# Patient Record
Sex: FEMALE | Race: WHITE | NOT HISPANIC OR LATINO
[De-identification: names, ages, dates, MRNs, and addresses within clinical notes are randomized per-mention and may not be internally consistent; named-entity substitution may affect disease eponyms.]

---

## 2018-05-30 ENCOUNTER — TRANSCRIBE ORDERS (OUTPATIENT)
Dept: SCHEDULING | Age: 43
End: 2018-05-30

## 2018-05-30 DIAGNOSIS — Z12.39 SCREENING BREAST EXAMINATION: Primary | ICD-10-CM

## 2018-06-04 ENCOUNTER — HOSPITAL ENCOUNTER (OUTPATIENT)
Dept: RADIOLOGY | Age: 43
Discharge: HOME | End: 2018-06-04
Attending: OBSTETRICS & GYNECOLOGY
Payer: COMMERCIAL

## 2018-06-04 DIAGNOSIS — Z12.39 SCREENING BREAST EXAMINATION: ICD-10-CM

## 2018-06-04 PROCEDURE — 77063 BREAST TOMOSYNTHESIS BI: CPT

## 2018-09-19 ENCOUNTER — APPOINTMENT (OUTPATIENT)
Dept: REHABILITATION | Facility: REHABILITATION | Age: 43
End: 2018-09-19
Attending: INTERNAL MEDICINE
Payer: COMMERCIAL

## 2018-09-19 PROCEDURE — 99000054 HC AQUATICS 10 VISTS

## 2018-09-26 ENCOUNTER — HOSPITAL ENCOUNTER (OUTPATIENT)
Dept: PHYSICAL MEDICINE AND REHAB | Facility: REHABILITATION | Age: 43
Discharge: HOME | End: 2018-09-26

## 2019-04-03 ENCOUNTER — TRANSCRIBE ORDERS (OUTPATIENT)
Dept: SCHEDULING | Facility: REHABILITATION | Age: 44
End: 2019-04-03

## 2019-04-03 DIAGNOSIS — G71.20: Primary | ICD-10-CM

## 2019-04-08 ENCOUNTER — TRANSCRIBE ORDERS (OUTPATIENT)
Dept: SCHEDULING | Facility: REHABILITATION | Age: 44
End: 2019-04-08

## 2019-04-08 DIAGNOSIS — R26.2 DIFFICULTY IN WALKING, NOT ELSEWHERE CLASSIFIED: ICD-10-CM

## 2019-04-08 DIAGNOSIS — G71.02 FACIOSCAPULOHUMERAL MUSCULAR DYSTROPHY (CMS/HCC): Primary | ICD-10-CM

## 2019-04-08 DIAGNOSIS — G82.52 QUADRIPLEGIA, C1-C4 INCOMPLETE (CMS/HCC): ICD-10-CM

## 2019-05-02 ENCOUNTER — HOSPITAL ENCOUNTER (OUTPATIENT)
Dept: PHYSICAL THERAPY | Facility: REHABILITATION | Age: 44
Setting detail: THERAPIES SERIES
Discharge: HOME | End: 2019-05-02
Attending: PHYSICAL MEDICINE & REHABILITATION
Payer: COMMERCIAL

## 2019-05-02 VITALS — DIASTOLIC BLOOD PRESSURE: 72 MMHG | HEART RATE: 88 BPM | SYSTOLIC BLOOD PRESSURE: 135 MMHG

## 2019-05-02 DIAGNOSIS — G71.02 FACIOSCAPULOHUMERAL MUSCULAR DYSTROPHY (CMS/HCC): Primary | ICD-10-CM

## 2019-05-02 DIAGNOSIS — R26.2 DIFFICULTY IN WALKING, NOT ELSEWHERE CLASSIFIED: ICD-10-CM

## 2019-05-02 DIAGNOSIS — G82.52 QUADRIPLEGIA, C1-C4 INCOMPLETE (CMS/HCC): ICD-10-CM

## 2019-05-02 PROCEDURE — 97163 PT EVAL HIGH COMPLEX 45 MIN: CPT

## 2019-05-02 RX ORDER — OXYBUTYNIN CHLORIDE 5 MG/1
5 TABLET ORAL 3 TIMES DAILY
COMMUNITY
End: 2020-09-23 | Stop reason: SDUPTHER

## 2019-05-02 RX ORDER — DULOXETIN HYDROCHLORIDE 30 MG/1
30 CAPSULE, DELAYED RELEASE ORAL DAILY
COMMUNITY
End: 2020-07-10 | Stop reason: ENTERED-IN-ERROR

## 2019-05-02 NOTE — OP PT TREATMENT LOG
Exercises Current Session Time Done Today    THER ACT TOTAL TIME FOR SESSION Not performed     Caregiver Stretching HEP     Bed mobility        Transfers     Caregiver Training  PROM / Stretching          Goals / POC Review Reviewed POC, goals and progression towards meeting goals     THER EX TOTAL TIME FOR SESSION Not performed    PROM      Stretching:      Trunk stretching      Trunk Strengthening      Sitting EOM reaching      Core Strengthening           NMRE TOTAL TIME FOR SESSION Not performed     Sitting static balance       Sitting dynamic balance        AQUATICS TOTAL TIME FOR SESSION Not performed                     MANUAL TOTAL TIME FOR SESSION Not performed            MODALITIES TOTAL TIME FOR SESSION Not performed

## 2019-05-02 NOTE — LETTER
[address] [place] [address]  [phone]  [facility] Fax: [phone]    PHYSICAL THERAPY PLAN OF CARE    Patient Name: Sofia Valente    Certification Dates:  From 19  To: 19  Frequency: 2 times/week Duration: 8 weeks  Other:      Provider: Francesca Calderon. 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     By signing this plan of care, I certify this plan of care as correct and necessary for the patient.        Physician Signature: _________________________________________ Date: _________________    Thank you for this referral. Please contact our department with any questions.      Francesca Calderon. Tomás, PT        Referring Provider: By co-signing this Plan of Care (POC), you agree with the planned services and interventions recommended by the therapist.         PT EVALUATION FOR OUTPATIENT THERAPY    Patient: Sofia Valente    MRN: [id number]  : 1975 44 y.o.     Referring Physician: Dmitri Donald MD  Date of Visit: 2019       Certification Dates:   19 through 19    Recommended Frequency & Duration:  2 times/week for up to 8 weeks     Diagnosis:   1. Facioscapulohumeral muscular dystrophy    2. Difficulty in walking, not elsewhere classified    3. Quadriplegia, C1-C4 incomplete (CMS/HCC) (HCC)        Chief Complaints:   Chief Complaint   Patient presents with   • Balance  Deficits   • Dec Strength   • Decreased Mobility       Precautions: other (see comments) (don't exercise to fatigue)    Past Medical History:   Past Medical History:   Diagnosis Date   • CD (Crohn's disease) (CMS/HCC) (HCC)    • Celiac disease    • FSHD (facioscapulohumeral muscular dystrophy)    • Tibial fracture     right tibia transverse fracture   • Urinary incontinence        Past Surgical History:   Past Surgical History:   Procedure Laterality Date   • APPENDECTOMY     • BOWEL RESECTION     • OTHER SURGICAL HISTORY Right     Scapulothoracic fusion          OBJECTIVE MEASUREMENTS/DATA:    Eval Assessment          Evaluation Assessment and Plan - 05/02/19 1600        Evaluation Assessment and Plan    Plan of Care reviewed and patient/family in agreement Yes    System Pathology/Pathophysiology Noted musculoskeletal;neuromuscular    Functional Limitations in Following Categories (PT Eval) self-care;community/leisure    Rehab Potential/Prognosis good, to achieve stated therapy goals    Problem List abnormal muscle tone;tetraplegia (quadriplegia);impaired postural control;impaired motor control    Clinical Assessment see PT note for clinical assessment     Planned Services CPT 89442 Aquatic therapy/exercises;CPT 17142 Neuromuscular Reeducation;CPT 73304 Therapeutic activities;CPT 00143 Therapeutic Massage;CPT 71895 Manual therapy;CPT 81925 Therpeutic exercises;CPT 38936 Wheelchair management;CPT 51355 Hot/Cold Packs therapy        General Info         General Information - 05/02/19 1648        Session Details    Document Type initial evaluation    Mode of Treatment individual therapy;physical therapy    Patient/Family Observations Sofia reports a progression in her disease process which has led to noticable increase in difficulty in reaching, drinking from a cup and transferring.  She reports that she would like to try to improve her ability to perform these tasks, develop a stretching program her new 1 day /  week aide can follow and return to aquatic maintenance program.    OP Specialty Neuro       Time Calculation    Start Time 1400    Stop Time 1500    Time Calculation (min) 60 min       General Information    Patient Profile Reviewed? yes    Referring Physician Dr. Donald    Pertinent History of Current Functional Problem Pt is a 40-year-old with PMH significant for facioscapulohumeral muscular dystrophy with tetraparesis, bilateral foot drop worse on the left than the right side, Crohn's disease, and celiac disease. Sofia reports a progression in her disease process which has led to noticeable increase in difficulty in reaching, drinking from a cup and transferring.  She reports that she would like to try to improve her ability to perform these tasks, develop a stretching program her new 1 day / week aide can follow and return to aquatic maintenance program    Existing Precautions/Restrictions other (see comments)   don't exercise to fatigue        Pain and Vitals         Pain/Vitals - 05/02/19 1647        Pain/Comfort/Sleep    Presence of Pain denies       Pre Activity Vital Signs    Pulse 88    /72    BP Location Right upper arm    BP Method Automatic    Patient Position Sitting       Pain Intervention    Intervention  na denies pain     Post Intervention Comments na denies pain         Falls Assessment          Falls - 05/02/19 1658        Initial Falls Assessment    One or more falls in the last year No        Living Environment          Living Environment - 05/02/19 1657        Living Environment    Lives With spouse    Living Arrangements house    Home Accessibility ramp to enter home        PLOF         Prior Level of Function - 05/02/19 1657        OTHER    Previous level of function 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  PWC user, performs lateral transfer Mod I         Neuromuscular      Sensory Tests     Skin         Skin Assessment - 05/02/19 1659        Skin    Skin Condition  "Intact        MMT         Manual Muscle Tests - 05/02/19 1600        RIGHT: Lower Extremity Manual Muscle Test Assessment    Hip Flexion gross movement (1+/5) trace plus    Hip Abduction gross movement (1+/5) trace plus    Hip Adduction gross movement (1+/5) trace plus    Knee Extension strength (1+/5) trace plus    Ankle Dorsiflexion gross movement (3-/5) fair minus    Ankle Plantarflexion gross movement (3/5) fair    Ankle Inversion gross movement (2/5) poor    Ankle Eversion gross movement (2/5) poor       LEFT: Lower Extremity Manual Muscle Test Assessment    Hip Flexion gross movement (1+/5) trace plus    Hip Abduction gross movement (0/5) zero    Hip Adduction gross movement (1+/5) trace plus    Knee Extension strength (1+/5) trace plus    Ankle Dorsiflexion gross movement (0/5) zero    Ankle Plantarflexion gross movement (0/5) zero    Ankle Inversion gross movement (0/5) zero    Ankle Eversion gross movement (0/5) zero        Posture         Posture - 05/02/19 1600        Postural Deviations    Comment, Head and Neck flattened    Shoulder right shoulder elevation    Upper Back left;winging    Comment, Upper Back R scapula fused to thorax    Low Back lordosis    Comment, Low Back excessive lordosis         Transfers         Transfers - 05/02/19 1600        Bed Mobility    [place], Supine to Sit maximum assist (25% patient effort)    [place], Sit to Supine moderate assist (50% patient effort)    Comment (Bed Mobility) pt requires level or downhill lateral tarnsfer set up: moves to L and places RUE on PWC cushion to then move into long sit; requires assistance to move from long sit to / from supine       Transfers    Comment excessive anterior weight shift with decreased buttock clearance        Stand to Sit Transfer    Comment Reports she has \"moved away from standing\"        Gait and Mobility         Gait and Mobility - 05/02/19 1600        Gait Training    [place], Gait --   not ambulating at " this time         Balance         Balance - 05/02/19 1600        Static Sitting Balance    [place], Unsupported Sitting modified independence    Sitting Position sitting edge of mat    Time Able to Maintain Position 1 to 2 minutes    [place], Supported Sitting modified independence    Sitting Position sitting edge of mat    Time Able to Maintain Position more than 5 minutes       Dynamic Sitting Balance    Comment, Reaches Outside Midline minimal ability to maki outside of EVA.  Seated Functional Reach: Forward 3.5 inches, Left 1 inch, Right 6 inches         PT CLINICAL ASSESSMENT:    Pt is a 40-year-old with PMH significant for facioscapulohumeral muscular dystrophy with tetraparesis, bilateral foot drop worse on the left than the right side, Crohn's disease, and celiac disease. Sofia reports a progression in her disease process which has led to noticeable increase in difficulty in reaching, drinking from a cup and transferring.  She reports that she would like to try to improve her ability to perform these tasks, develop a stretching program her new 1 day / week aide can follow and return to aquatic maintenance program.  She present with tetraplegia from FSHD, decreased endurance, report of worsening ability to reach / drink from a cup, and severe postural abnormalities.  PT POC to focus on improving sitting balance for transfer safety, improving ability to reach, developing stretching HEP for aide, and assessing ability to return to aquatic maintenance program.  Anticipate 4-6 weeks of PT 2x / week.     Goals     • PT GOALS                Short Term Goals Time Frame Result Comment/Progress   Pt will direct aide to perform home stretching program    4-6 weeks     Improve sitting functional reach test by > 1 inch in all directions   4-6 weeks     Pt will perform lateral transfer WC<> chair lift in/out of pool with S to return to maintenance program   4-6 weeks      Pt will perform safe transfer wet WC  <> mat table to change after aquatic therapy to return to maintenance program    4-6 weeks     Pt will walk in 4 ft deep water with 1# ankle weights with distant supervision to return to maintenance program.   4-6 weeks     Pt will be independent with maintenance aquatic program .   4-6weeks                     TREATMENT PLAN:      Exercises Current Session Time Done Today    THER ACT TOTAL TIME FOR SESSION Not performed     Caregiver Stretching HEP     Bed mobility        Transfers     Caregiver Training  PROM / Stretching          Goals / POC Review Reviewed POC, goals and progression towards meeting goals     THER EX TOTAL TIME FOR SESSION Not performed    PROM      Stretching:      Trunk stretching      Trunk Strengthening      Sitting EOM reaching      Core Strengthening           NMRE TOTAL TIME FOR SESSION Not performed     Sitting static balance       Sitting dynamic balance        AQUATICS TOTAL TIME FOR SESSION Not performed                     MANUAL TOTAL TIME FOR SESSION Not performed            MODALITIES TOTAL TIME FOR SESSION Not performed                            ASSESSMENT:    This 44 y.o. year old female presents to PT with above stated diagnosis. Physical Therapy evaluation reveals abnormal muscle tone, tetraplegia (quadriplegia), impaired postural control, impaired motor control resulting in self-care, community/leisure limitations. Sofia Valente will benefit from skilled PT services to address limitation, work towards rehab and patient goals and maximize PLOF of chosen ADLs.     Planned Services: The patient's treatment will include CPT 45139 Aquatic therapy/exercises, CPT 28323 Neuromuscular Reeducation, CPT 19140 Therapeutic activities, CPT 64462 Therapeutic Massage, CPT 84089 Manual therapy, CPT 34940 Therpeutic exercises, CPT 53744 Wheelchair management, CPT 56679 Hot/Cold Packs therapy, .

## 2019-05-04 NOTE — PROGRESS NOTES
Referring Provider: By co-signing this Plan of Care (POC), you agree with the planned services and interventions recommended by the therapist.         PT EVALUATION FOR OUTPATIENT THERAPY    Patient: Sofia Valente    MRN: [id number]  : 1975 44 y.o.     Referring Physician: Dmitri Donald MD  Date of Visit: 2019       Certification Dates:   19 through 19    Recommended Frequency & Duration:  2 times/week for up to 8 weeks     Diagnosis:   1. Facioscapulohumeral muscular dystrophy    2. Difficulty in walking, not elsewhere classified    3. Quadriplegia, C1-C4 incomplete (CMS/HCC) ([facility])        Chief Complaints:   Chief Complaint   Patient presents with   • Balance Deficits   • Dec Strength   • Decreased Mobility       Precautions: other (see comments) (don't exercise to fatigue)    Past Medical History:   Past Medical History:   Diagnosis Date   • CD (Crohn's disease) (CMS/HCC) ([facility])    • Celiac disease    • FSHD (facioscapulohumeral muscular dystrophy)    • Tibial fracture     right tibia transverse fracture   • Urinary incontinence        Past Surgical History:   Past Surgical History:   Procedure Laterality Date   • APPENDECTOMY     • BOWEL RESECTION     • OTHER SURGICAL HISTORY Right     Scapulothoracic fusion          OBJECTIVE MEASUREMENTS/DATA:    Eval Assessment          Evaluation Assessment and Plan - 19 1600        Evaluation Assessment and Plan    Plan of Care reviewed and patient/family in agreement Yes    System Pathology/Pathophysiology Noted musculoskeletal;neuromuscular    Functional Limitations in Following Categories (PT Eval) self-care;community/leisure    Rehab Potential/Prognosis good, to achieve stated therapy goals    Problem List abnormal muscle tone;tetraplegia (quadriplegia);impaired postural control;impaired motor control    Clinical Assessment see PT note for clinical assessment     Planned Services CPT 77326 Aquatic therapy/exercises;CPT  48904 Neuromuscular Reeducation;CPT 28913 Therapeutic activities;CPT 27289 Therapeutic Massage;CPT 15034 Manual therapy;CPT 96730 Therpeutic exercises;CPT 84291 Wheelchair management;CPT 19536 Hot/Cold Packs therapy        General Info         General Information - 05/02/19 1648        Session Details    Document Type initial evaluation    Mode of Treatment individual therapy;physical therapy    Patient/Family Observations Sofia reports a progression in her disease process which has led to noticable increase in difficulty in reaching, drinking from a cup and transferring.  She reports that she would like to try to improve her ability to perform these tasks, develop a stretching program her new 1 day / week aide can follow and return to aquatic maintenance program.    OP Specialty Neuro       Time Calculation    Start Time 1400    Stop Time 1500    Time Calculation (min) 60 min       General Information    Patient Profile Reviewed? yes    Referring Physician Dr. Donald    Pertinent History of Current Functional Problem Pt is a 40-year-old with PMH significant for facioscapulohumeral muscular dystrophy with tetraparesis, bilateral foot drop worse on the left than the right side, Crohn's disease, and celiac disease. Sofia reports a progression in her disease process which has led to noticeable increase in difficulty in reaching, drinking from a cup and transferring.  She reports that she would like to try to improve her ability to perform these tasks, develop a stretching program her new 1 day / week aide can follow and return to aquatic maintenance program    Existing Precautions/Restrictions other (see comments)   don't exercise to fatigue        Pain and Vitals         Pain/Vitals - 05/02/19 1647        Pain/Comfort/Sleep    Presence of Pain denies       Pre Activity Vital Signs    Pulse 88    /72    BP Location Right upper arm    BP Method Automatic    Patient Position Sitting       Pain Intervention     Intervention  na denies pain     Post Intervention Comments na denies pain         Falls Assessment          Falls - 05/02/19 1658        Initial Falls Assessment    One or more falls in the last year No        Living Environment          Living Environment - 05/02/19 1657        Living Environment    Lives With spouse    Living Arrangements house    Home Accessibility ramp to enter home        PLOF         Prior Level of Function - 05/02/19 1657        OTHER    Previous level of function 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  PWC user, performs lateral transfer Mod I         Neuromuscular      Sensory Tests     Skin         Skin Assessment - 05/02/19 1659        Skin    Skin Condition Intact        MMT         Manual Muscle Tests - 05/02/19 1600        RIGHT: Lower Extremity Manual Muscle Test Assessment    Hip Flexion gross movement (1+/5) trace plus    Hip Abduction gross movement (1+/5) trace plus    Hip Adduction gross movement (1+/5) trace plus    Knee Extension strength (1+/5) trace plus    Ankle Dorsiflexion gross movement (3-/5) fair minus    Ankle Plantarflexion gross movement (3/5) fair    Ankle Inversion gross movement (2/5) poor    Ankle Eversion gross movement (2/5) poor       LEFT: Lower Extremity Manual Muscle Test Assessment    Hip Flexion gross movement (1+/5) trace plus    Hip Abduction gross movement (0/5) zero    Hip Adduction gross movement (1+/5) trace plus    Knee Extension strength (1+/5) trace plus    Ankle Dorsiflexion gross movement (0/5) zero    Ankle Plantarflexion gross movement (0/5) zero    Ankle Inversion gross movement (0/5) zero    Ankle Eversion gross movement (0/5) zero        Posture         Posture - 05/02/19 1600        Postural Deviations    Comment, Head and Neck flattened    Shoulder right shoulder elevation    Upper Back left;winging    Comment, Upper Back R scapula fused to thorax    Low Back lordosis    Comment, Low Back excessive lordosis      "    Transfers         Transfers - 05/02/19 1600        Bed Mobility    [place], Supine to Sit maximum assist (25% patient effort)    [place], Sit to Supine moderate assist (50% patient effort)    Comment (Bed Mobility) pt requires level or downhill lateral tarnsfer set up: moves to L and places RUE on PWC cushion to then move into long sit; requires assistance to move from long sit to / from supine       Transfers    Comment excessive anterior weight shift with decreased buttock clearance        Stand to Sit Transfer    Comment Reports she has \"moved away from standing\"        Gait and Mobility         Gait and Mobility - 05/02/19 1600        Gait Training    [place], Gait --   not ambulating at this time         Balance         Balance - 05/02/19 1600        Static Sitting Balance    [place], Unsupported Sitting modified independence    Sitting Position sitting edge of mat    Time Able to Maintain Position 1 to 2 minutes    [place], Supported Sitting modified independence    Sitting Position sitting edge of mat    Time Able to Maintain Position more than 5 minutes       Dynamic Sitting Balance    Comment, Reaches Outside Midline minimal ability to maki outside of EVA.  Seated Functional Reach: Forward 3.5 inches, Left 1 inch, Right 6 inches         PT CLINICAL ASSESSMENT:    Pt is a 40-year-old with PMH significant for facioscapulohumeral muscular dystrophy with tetraparesis, bilateral foot drop worse on the left than the right side, Crohn's disease, and celiac disease. Sofia reports a progression in her disease process which has led to noticeable increase in difficulty in reaching, drinking from a cup and transferring.  She reports that she would like to try to improve her ability to perform these tasks, develop a stretching program her new 1 day / week aide can follow and return to aquatic maintenance program.  She present with tetraplegia from FSHD, decreased endurance, report of " worsening ability to reach / drink from a cup, and severe postural abnormalities.  PT POC to focus on improving sitting balance for transfer safety, improving ability to reach, developing stretching HEP for aide, and assessing ability to return to aquatic maintenance program.  Anticipate 4-6 weeks of PT 2x / week.     Goals     • PT GOALS                Short Term Goals Time Frame Result Comment/Progress   Pt will direct aide to perform home stretching program    4-6 weeks     Improve sitting functional reach test by > 1 inch in all directions   4-6 weeks     Pt will perform lateral transfer WC<> chair lift in/out of pool with S to return to maintenance program   4-6 weeks      Pt will perform safe transfer wet WC <> mat table to change after aquatic therapy to return to maintenance program    4-6 weeks     Pt will walk in 4 ft deep water with 1# ankle weights with distant supervision to return to maintenance program.   4-6 weeks     Pt will be independent with maintenance aquatic program .   4-6weeks                     TREATMENT PLAN:      Exercises Current Session Time Done Today    THER ACT TOTAL TIME FOR SESSION Not performed     Caregiver Stretching HEP     Bed mobility        Transfers     Caregiver Training  PROM / Stretching          Goals / POC Review Reviewed POC, goals and progression towards meeting goals     THER EX TOTAL TIME FOR SESSION Not performed    PROM      Stretching:      Trunk stretching      Trunk Strengthening      Sitting EOM reaching      Core Strengthening           NMRE TOTAL TIME FOR SESSION Not performed     Sitting static balance       Sitting dynamic balance        AQUATICS TOTAL TIME FOR SESSION Not performed                     MANUAL TOTAL TIME FOR SESSION Not performed            MODALITIES TOTAL TIME FOR SESSION Not performed                            ASSESSMENT:    This 44 y.o. year old female presents to PT with above stated diagnosis. Physical Therapy evaluation reveals  abnormal muscle tone, tetraplegia (quadriplegia), impaired postural control, impaired motor control resulting in self-care, community/leisure limitations. Sofia Valente will benefit from skilled PT services to address limitation, work towards rehab and patient goals and maximize PLOF of chosen ADLs.     Planned Services: The patient's treatment will include CPT 38610 Aquatic therapy/exercises, CPT 98504 Neuromuscular Reeducation, CPT 24749 Therapeutic activities, CPT 65205 Therapeutic Massage, CPT 59524 Manual therapy, CPT 01741 Therpeutic exercises, CPT 82703 Wheelchair management, CPT 87520 Hot/Cold Packs therapy, .

## 2019-05-08 ENCOUNTER — HOSPITAL ENCOUNTER (OUTPATIENT)
Dept: PHYSICAL THERAPY | Facility: REHABILITATION | Age: 44
Setting detail: THERAPIES SERIES
Discharge: HOME | End: 2019-05-08
Attending: PHYSICAL MEDICINE & REHABILITATION
Payer: COMMERCIAL

## 2019-05-08 DIAGNOSIS — G71.02 FACIOSCAPULOHUMERAL MUSCULAR DYSTROPHY (CMS/HCC): Primary | ICD-10-CM

## 2019-05-08 PROCEDURE — 97530 THERAPEUTIC ACTIVITIES: CPT | Mod: GP

## 2019-05-08 PROCEDURE — 97110 THERAPEUTIC EXERCISES: CPT | Mod: GP

## 2019-05-08 NOTE — OP PT TREATMENT LOG
"Exercises Current Session Time Done Today    THER ACT TOTAL TIME FOR SESSION 8-22 min (15)     Caregiver Stretching HEP     Bed mobility  Mod A short sit > supine   Mod A supine > long sit  [place] long sit > short sit at RLE due to having shoes on  Yes         Transfers Lateral transfer level surface Mod I  Yes     Caregiver Training  PROM / Stretching          Goals / POC Review Reviewed POC, goals and progression towards meeting goals     THER EX TOTAL TIME FOR SESSION 38 - 52 minutes (45)    PROM PROM BLEs 10 x each direction ankle, knee hip  HS stretch 30\" x 3 each sie  Gastroc stretch 30\" x 3 each side  Hip ADD stretch 30\" x 3 each side   All performed by PT  yes   Stretching:      Trunk stretching      Trunk Strengthening  Short sit EOM small wedge lateral:  Sit <> lateral forearm prop 10 x 2 each direction  Trunk flexion <> ext from sitting to 2 pillows on lap with forearms on pillows 10 x 2   yes   Sitting EOM reaching  Cut out table set on lowest angle:  LUE SA push up + from forearm 10 x 2  LUE shoulder flexion forearm on pillowcase 10x  IR/ER 10 x    yes   Core Strengthening     Wall Ladder Shoulder ABD up/down wall ladder 2x  Shoulder flexion LUE only wall ladder 2x   Hold for stretch at top  yes   NMRE TOTAL TIME FOR SESSION Not performed     Sitting static balance       Sitting dynamic balance        AQUATICS TOTAL TIME FOR SESSION Not performed                     MANUAL TOTAL TIME FOR SESSION Not performed            MODALITIES TOTAL TIME FOR SESSION Not performed                    "

## 2019-05-08 NOTE — PROGRESS NOTES
PT DAILY NOTE FOR OUTPATIENT THERAPY    Patient: Sofia Valente   MRN: [id number]  : 1975 44 y.o.  Referring Physician: Dmitri Donald MD  Date of Visit: 2019      Certification Dates: 19 through 19    Diagnosis:   1. Facioscapulohumeral muscular dystrophy        Chief Complaints: No chief complaint on file.      Precautions:        TODAY'S VISIT          General Information - 19 1239        Session Details    Document Type daily treatment    Mode of Treatment individual therapy;physical therapy    Patient/Family Observations Reports she went to a conference this weekend; feeling good     OP Specialty Neuro       Time Calculation    Start Time 1103    Stop Time 1203    Time Calculation (min) 60 min       General Information    Patient Profile Reviewed? yes              Pain/Vitals - 19 1238        Pain/Comfort/Sleep    Presence of Pain denies       Pain Intervention    Intervention  n/a denies pain     Post Intervention Comments n/a denies pain               Daily Falls Screen - 19 1243        Daily Falls Assessment    Patient reported fall since last visit No              Daily Treatment Assessment and Plan - 19 1250        Daily Treatment Assessment and Plan    Progress toward goals Progressing    Daily Outcome Summary Tolerated ther ex well without report of fatigue.  Likes the stretch she achieves with the wall ladder; will look into home purchase.  Plan to transition to aquatics as soon as possible.     Plan and Recommendations Cont POC.           OBJECTIVE DATA TAKEN TODAY:    None taken    Today's Treatment:      Exercises Current Session Time Done Today    THER ACT TOTAL TIME FOR SESSION 8-22 min (15)     Caregiver Stretching HEP     Bed mobility  Mod A short sit > supine   Mod A supine > long sit  [place] long sit > short sit at RLE due to having shoes on  Yes         Transfers Lateral transfer level surface Mod I  Yes     Caregiver Training  PROM  "/ Stretching          Goals / POC Review Reviewed POC, goals and progression towards meeting goals     THER EX TOTAL TIME FOR SESSION 38 - 52 minutes (45)    PROM PROM BLEs 10 x each direction ankle, knee hip  HS stretch 30\" x 3 each sie  Gastroc stretch 30\" x 3 each side  Hip ADD stretch 30\" x 3 each side   All performed by PT  yes   Stretching:      Trunk stretching      Trunk Strengthening  Short sit EOM small wedge lateral:  Sit <> lateral forearm prop 10 x 2 each direction  Trunk flexion <> ext from sitting to 2 pillows on lap with forearms on pillows 10 x 2   yes   Sitting EOM reaching  Cut out table set on lowest angle:  LUE SA push up + from forearm 10 x 2  LUE shoulder flexion forearm on pillowcase 10x  IR/ER 10 x    yes   Core Strengthening     Wall Ladder Shoulder ABD up/down wall ladder 2x  Shoulder flexion LUE only wall ladder 2x   Hold for stretch at top  yes   NMRE TOTAL TIME FOR SESSION Not performed     Sitting static balance       Sitting dynamic balance        AQUATICS TOTAL TIME FOR SESSION Not performed                     MANUAL TOTAL TIME FOR SESSION Not performed            MODALITIES TOTAL TIME FOR SESSION Not performed                                "

## 2019-05-14 ENCOUNTER — HOSPITAL ENCOUNTER (OUTPATIENT)
Dept: PHYSICAL THERAPY | Facility: REHABILITATION | Age: 44
Setting detail: THERAPIES SERIES
Discharge: HOME | End: 2019-05-14
Attending: PHYSICAL MEDICINE & REHABILITATION
Payer: COMMERCIAL

## 2019-05-14 DIAGNOSIS — G71.02 FACIOSCAPULOHUMERAL MUSCULAR DYSTROPHY (CMS/HCC): Primary | ICD-10-CM

## 2019-05-14 PROCEDURE — 97113 AQUATIC THERAPY/EXERCISES: CPT | Mod: GP

## 2019-05-14 NOTE — OP PT TREATMENT LOG
Exercises Current Session Time   NEURO RE-ED TOTAL TIME FOR SESSION Not performed        AQUATICS SESSION;  55 min total (billed as aquatics)       THER ACT TOTAL TIME FOR SESSION Billed as aquatics   Pool entrance Via lift chair:  Pt I jackson and sets up for slide transfers L to R from power W/C to lift chair:  Slow, but S only from PT.    D to place ankle weights on prior to pool entry (as allowed in maintenance aquatics).    Once lowered into the pool:  Floats over to the wall/rail using 1 noodle; using rail, navigates to 4 1/2 ft for standing TE at bar (as below)   Pool exit Able to safely return to lift chair in 3 ft using wall/rail, then floating with noodle (approx 2-3 ft) to chair.  I places feet on foot board.    Once out of pool:  Safely slide transfers lift chair to W/C on L (slightly uphill) with S, using head/hips relationship to lift and scoot buttocks (towel on seat to decrease friction).    Ankle Weights removed by staff.       THER EX TOTAL TIME FOR SESSION Billed as aquatics   Standing TE at wall/rail Standing tolerance (achieving knee ext B without A)  Alt march high knees x 30+ reps  Hip ABD B x 30+ reps     Walking 4 ft with noodle With S and B 1# ankle weights x 30 minutes.  No LOB, no contact by therapist needed for safety.         GAIT TRAINING TOTAL TIME FOR SESSION Not performed       MANUAL TOTAL TIME FOR SESSION Not performed       MODALITIES TOTAL TIME FOR SESSION Not performed

## 2019-05-14 NOTE — PROGRESS NOTES
PT DAILY NOTE FOR OUTPATIENT THERAPY    Patient: Sofia Valente   MRN: [id number]  : 1975 44 y.o.  Referring Physician: Dmitri Donald MD  Date of Visit: 2019      Certification Dates: 19 through 19    Diagnosis:   1. Facioscapulohumeral muscular dystrophy        Precautions: other (see comments) (do not exercise to fatigue)      TODAY'S VISIT          General Information - 19 1029        Session Details    Document Type daily treatment    Mode of Treatment individual therapy;physical therapy    Patient/Family Observations No complaints.  Excited to get back in the pool with goal of eventual return to maintenance aquatics.    OP Specialty Neuro       Time Calculation    Start Time 0900    Stop Time 0955    Time Calculation (min) 55 min       General Information    Patient Profile Reviewed? yes    Existing Precautions/Restrictions other (see comments)   do not exercise to fatigue              Pain/Vitals - 19 1029        Pain/Comfort/Sleep    Presence of Pain denies       Pain Intervention    Intervention  n/a denies pain    Post Intervention Comments n/a denies pain              Daily Falls Screen - 19 1033        Daily Falls Assessment    Patient reported fall since last visit No              Daily Treatment Assessment and Plan - 19 1051        Daily Treatment Assessment and Plan    Progress toward goals Progressing    Daily Outcome Summary Aquatics session today:  pt safely entered/exited pool with S only (watch for LOB forward with transfer onto lift chair per pt request - no issues today).  She was able to safely perform her standing LE TE and walking as noted in treatment log.    Plan and Recommendations Recommend at least 1 more session in pool before transitioning to maintenance aquatics to confirm consistency of safe performance.            OBJECTIVE DATA TAKEN TODAY:        Today's Treatment:      Exercises Current Session Time   NEURO RE-ED  TOTAL TIME FOR SESSION Not performed        AQUATICS SESSION;  55 min total (billed as aquatics)       THER ACT TOTAL TIME FOR SESSION Billed as aquatics   Pool entrance Via lift chair:  Pt I jackson and sets up for slide transfers L to R from power W/C to lift chair:  Slow, but S only from PT.    D to place ankle weights on prior to pool entry (as allowed in maintenance aquatics).    Once lowered into the pool:  Floats over to the wall/rail using 1 noodle; using rail, navigates to 4 1/2 ft for standing TE at bar (as below)   Pool exit Able to safely return to lift chair in 3 ft using wall/rail, then floating with noodle (approx 2-3 ft) to chair.  I places feet on foot board.    Once out of pool:  Safely slide transfers lift chair to W/C on L (slightly uphill) with S, using head/hips relationship to lift and scoot buttocks (towel on seat to decrease friction).    Ankle Weights removed by staff.       THER EX TOTAL TIME FOR SESSION Billed as aquatics   Standing TE at wall/rail Standing tolerance (achieving knee ext B without A)  Alt march high knees x 30+ reps  Hip ABD B x 30+ reps     Walking 4 ft with noodle With S and B 1# ankle weights x 30 minutes.  No LOB, no contact by therapist needed for safety.         GAIT TRAINING TOTAL TIME FOR SESSION Not performed       MANUAL TOTAL TIME FOR SESSION Not performed       MODALITIES TOTAL TIME FOR SESSION Not performed

## 2019-05-17 ENCOUNTER — HOSPITAL ENCOUNTER (OUTPATIENT)
Dept: PHYSICAL THERAPY | Facility: REHABILITATION | Age: 44
Setting detail: THERAPIES SERIES
Discharge: HOME | End: 2019-05-17
Attending: PHYSICAL MEDICINE & REHABILITATION
Payer: COMMERCIAL

## 2019-05-17 DIAGNOSIS — G71.02 FACIOSCAPULOHUMERAL MUSCULAR DYSTROPHY (CMS/HCC): Primary | ICD-10-CM

## 2019-05-17 PROBLEM — R53.1 WEAKNESS: Status: ACTIVE | Noted: 2019-05-17

## 2019-05-17 PROCEDURE — 97530 THERAPEUTIC ACTIVITIES: CPT | Mod: GP

## 2019-05-17 PROCEDURE — 97110 THERAPEUTIC EXERCISES: CPT | Mod: GP

## 2019-05-17 NOTE — PROGRESS NOTES
PT DAILY NOTE FOR OUTPATIENT THERAPY    Patient: Sofia Valente   MRN: [id number]  : 1975 44 y.o.  Referring Physician: Dmitri Donald MD  Date of Visit: 2019      Certification Dates: 19 through 19    Diagnosis:   1. Facioscapulohumeral muscular dystrophy        Chief Complaints:   Chief Complaint   Patient presents with   • Dec Strength       Precautions: fall, other (see comments) (watch exercise fatigue)      TODAY'S VISIT          General Information - 19 1558        Session Details    Document Type daily treatment    Mode of Treatment individual therapy;physical therapy    Patient/Family Observations Back is not bad today.  Fatigued only due to volunteering today at the rehab.      OP Specialty Neuro       General Information    Existing Precautions/Restrictions fall;other (see comments)   watch exercise fatigue              Pain/Vitals - 19 1557        Pain/Comfort/Sleep    Presence of Pain denies   less than baseline in lumbar soreness       Pain Intervention    Intervention  none    Post Intervention Comments none                Daily Treatment Assessment and Plan - 19 1500        Daily Treatment Assessment and Plan    Progress toward goals Progressing    Daily Outcome Summary Focused on stretches that maintain muscular balance and allow functional compensation.  Motomed tolerated though less tolerance than last episode of care.            OBJECTIVE DATA TAKEN TODAY:    None taken    Today's Treatment:      Exercises Current Session Time Done Today    THER ACT TOTAL TIME FOR SESSION 8-22 min (15)     Caregiver Stretching HEP     Bed mobility  Mod A short sit > supine at LEs  Dependent+Inés supine > long sit  [place] long sit > short sit at RLE due to having shoes on  Yes  Yes  Yes   Transfers Lateral transfer level surface Mod I  Yes     Caregiver Training  PROM / Stretching          Goals / POC Review Reviewed POC, goals and progression towards  "meeting goals     THER EX TOTAL TIME FOR SESSION 38 - 52 minutes (45)    PROM PROM BLEs 10 x each direction ankle, knee hip  HS stretch 30\" x 3 each side not past 90degrees  Gastroc stretch 30\" x 3 each side  Hip ADD stretch 30\" x 3 each side   SKTC stretch 30\"x3  All performed by PT   Aide to be trained Yes all   Stretching:      Trunk stretching      Trunk Strengthening  Short sit EOM small wedge lateral:  Sit <> lateral forearm prop 10 x 2 each direction  Trunk flexion <> ext from sitting to 2 pillows on lap with forearms on pillows 10 x 2  TAC activation  Yes  Yes      Yes   Sitting EOM reaching  Cut out table set on lowest angle:  LUE SA push up + from forearm 10 x 2  LUE shoulder flexion forearm on pillowcase 10x  IR/ER 10x       Motomed L0 with motor assist 5 min fwd (fatigue felt) and 3 min back Yes   Core Strengthening     Wall Ladder Shoulder ABD up/down wall ladder 2x  Shoulder flexion LUE only wall ladder 2x   Hold for stretch at top     NMRE TOTAL TIME FOR SESSION Not performed     Sitting static balance       Sitting dynamic balance        AQUATICS TOTAL TIME FOR SESSION Not performed                     MANUAL TOTAL TIME FOR SESSION Not performed            MODALITIES TOTAL TIME FOR SESSION Not performed                                "

## 2019-05-17 NOTE — OP PT TREATMENT LOG
"Exercises Current Session Time Done Today    THER ACT TOTAL TIME FOR SESSION 8-22 min (15)     Caregiver Stretching HEP     Bed mobility  Mod A short sit > supine at LEs  Dependent+Ariadna supine > long sit  [place] long sit > short sit at RLE due to having shoes on  Yes  Yes  Yes   Transfers Lateral transfer level surface Mod I  Yes     Caregiver Training  PROM / Stretching          Goals / POC Review Reviewed POC, goals and progression towards meeting goals     THER EX TOTAL TIME FOR SESSION 38 - 52 minutes (45)    PROM PROM BLEs 10 x each direction ankle, knee hip  HS stretch 30\" x 3 each side not past 90degrees  Gastroc stretch 30\" x 3 each side  Hip ADD stretch 30\" x 3 each side   SKTC stretch 30\"x3  All performed by PT   Aide to be trained Yes all   Stretching:      Trunk stretching      Trunk Strengthening  Short sit EOM small wedge lateral:  Sit <> lateral forearm prop 10 x 2 each direction  Trunk flexion <> ext from sitting to 2 pillows on lap with forearms on pillows 10 x 2  TAC activation  Yes  Yes      Yes   Sitting EOM reaching  Cut out table set on lowest angle:  LUE SA push up + from forearm 10 x 2  LUE shoulder flexion forearm on pillowcase 10x  IR/ER 10x       Motomed L0 with motor assist 5 min fwd (fatigue felt) and 3 min back Yes   Core Strengthening     Wall Ladder Shoulder ABD up/down wall ladder 2x  Shoulder flexion LUE only wall ladder 2x   Hold for stretch at top     NMRE TOTAL TIME FOR SESSION Not performed     Sitting static balance       Sitting dynamic balance        AQUATICS TOTAL TIME FOR SESSION Not performed                     MANUAL TOTAL TIME FOR SESSION Not performed            MODALITIES TOTAL TIME FOR SESSION Not performed                    "

## 2019-05-22 ENCOUNTER — HOSPITAL ENCOUNTER (OUTPATIENT)
Dept: PHYSICAL THERAPY | Facility: REHABILITATION | Age: 44
Setting detail: THERAPIES SERIES
Discharge: HOME | End: 2019-05-22
Attending: PHYSICAL MEDICINE & REHABILITATION
Payer: COMMERCIAL

## 2019-05-22 DIAGNOSIS — G71.02 FACIOSCAPULOHUMERAL MUSCULAR DYSTROPHY (CMS/HCC): Primary | ICD-10-CM

## 2019-05-22 PROCEDURE — 97113 AQUATIC THERAPY/EXERCISES: CPT | Mod: GP

## 2019-05-22 NOTE — OP PT TREATMENT LOG
Exercises Current Session Time   NEURO RE-ED TOTAL TIME FOR SESSION Not performed               Aquatics TOTAL TIME FOR SESSION 55 minutes   Pool entrance Via lift chair:  Pt I jackson and sets up for slide transfers L to R from power W/C to lift chair:  Slow, but S only from PT.    D to place ankle weights on prior to pool entry (as allowed in maintenance aquatics).    Once lowered into the pool:  Floats over to the wall/rail using 1 noodle; using rail, navigates to 4 1/2 ft for standing TE at bar (as below)   Pool exit Able to safely return to lift chair in 3 ft using wall/rail, then floating with noodle (approx 2-3 ft) to chair.  I places feet on foot board.  Wears pants to assist with slide to lift chair then needs assist from aide to doff    Once out of pool:  Safely slide transfers lift chair to W/C on L (slightly uphill) with S, using head/hips relationship to lift and scoot buttocks (towel on seat to decrease friction).    Ankle Weights removed by staff.   Standing TE at wall/rail  Yes     Standing tolerance (achieving knee ext B without A)  Alt march high knees x 30+ reps  Hip ABD B x 30+ reps     Supine to stretch    UE with paddles    Walking 4 ft with noodle With S and B 1# ankle weights x 30 minutes.  x2 LOB self corrected with use of noodle and uses UEs to paddle to wall and recover to stand and continue walking        GAIT TRAINING TOTAL TIME FOR SESSION Not performed       MANUAL TOTAL TIME FOR SESSION Not performed       MODALITIES TOTAL TIME FOR SESSION Not performed

## 2019-05-30 ENCOUNTER — HOSPITAL ENCOUNTER (OUTPATIENT)
Dept: PHYSICAL THERAPY | Facility: REHABILITATION | Age: 44
Setting detail: THERAPIES SERIES
Discharge: HOME | End: 2019-05-30
Attending: PHYSICAL MEDICINE & REHABILITATION
Payer: COMMERCIAL

## 2019-05-30 DIAGNOSIS — G71.02 FACIOSCAPULOHUMERAL MUSCULAR DYSTROPHY (CMS/HCC): Primary | ICD-10-CM

## 2019-05-30 PROCEDURE — 97113 AQUATIC THERAPY/EXERCISES: CPT | Mod: GP,59

## 2019-05-30 PROCEDURE — 97530 THERAPEUTIC ACTIVITIES: CPT | Mod: GP

## 2019-05-30 NOTE — OP PT TREATMENT LOG
Exercises Current Session Time   NEURO RE-ED TOTAL TIME FOR SESSION Not performed               Aquatics TOTAL TIME FOR SESSION 55 minutes   Pool entrance Via lift chair:  Pt I jackson and sets up for slide transfers L to R from power W/C to lift chair:  Slow, but S only from PT.    D to place ankle weights on prior to pool entry (as allowed in maintenance aquatics).    Once lowered into the pool:  Floats over to the wall/rail using 1 noodle; using rail, navigates to 4 1/2 ft for standing TE at bar (as below)   Pool exit Able to safely return to lift chair in 3 ft using wall/rail, then floating with noodle (approx 2-3 ft) to chair.  I places feet on foot board.  Wears pants to assist with slide to lift chair then needs assist from aide to doff    Once out of pool:  Safely slide transfers lift chair to W/C on L (slightly uphill) with S, using head/hips relationship to lift and scoot buttocks (towel on seat to decrease friction).    Ankle Weights removed by staff.   Standing TE at wall/rail  Yes     Standing tolerance (achieving knee ext B without A)  Alt march high knees x 30+ reps  Hip ABD B x 30+ reps     Supine to stretch    UE with paddles    Walking 4 ft with noodle With S and B 1# ankle weights x 30 minutes.  x2 LOB self corrected with use of noodle and uses UEs to paddle to wall and recover to stand and continue walking      Lateral stepping In 4ft depth with noodle: x 5 min Cl S      GAIT TRAINING TOTAL TIME FOR SESSION Not performed       MANUAL TOTAL TIME FOR SESSION Not performed       MODALITIES TOTAL TIME FOR SESSION Not performed

## 2019-05-30 NOTE — PROGRESS NOTES
"PT DAILY NOTE FOR OUTPATIENT THERAPY    Patient: Sofia Valente   MRN: [id number]  : 1975 44 y.o.  Referring Physician: Dmitri Donald MD  Date of Visit: 2019      Certification Dates: 19 through 19    Diagnosis:   1. Facioscapulohumeral muscular dystrophy        Chief Complaints: No chief complaint on file.      Precautions: fall      TODAY'S VISIT          General Information - 19 1531        Session Details    Document Type daily treatment    Mode of Treatment individual therapy;physical therapy    Patient/Family Observations \"I love the feeling of walking in the water\"    OP Specialty Neuro       Time Calculation    Start Time 1000    Stop Time 1055    Time Calculation (min) 55 min       General Information    Patient Profile Reviewed? yes    Existing Precautions/Restrictions fall              Pain/Vitals - 19 1531        Pain/Comfort/Sleep    Presence of Pain denies       Pain Intervention    Intervention  n/a denies pain     Post Intervention Comments n/a denies pain               Daily Falls Screen - 19 1533        Daily Falls Assessment    Patient reported fall since last visit No              Daily Treatment Assessment and Plan - 19 1534        Daily Treatment Assessment and Plan    Progress toward goals Progressing    Daily Outcome Summary Good stability with fwd walking in pool with two episodes of \"unlocking\" of knee requring float to side of pool to regain stance position.  Did not require assistance with float to side.  Added in lateral stepping with good ability to maintain balance using noodle.      Plan and Recommendations Plan to continued aquatic therapy to build maintenance program.           OBJECTIVE DATA TAKEN TODAY:    None taken    Today's Treatment:      Exercises Current Session Time   NEURO RE-ED TOTAL TIME FOR SESSION Not performed               Aquatics TOTAL TIME FOR SESSION 55 minutes   Pool entrance Via lift chair:  Pt " I jackson and sets up for slide transfers L to R from power W/C to lift chair:  Slow, but S only from PT.    D to place ankle weights on prior to pool entry (as allowed in maintenance aquatics).    Once lowered into the pool:  Floats over to the wall/rail using 1 noodle; using rail, navigates to 4 1/2 ft for standing TE at bar (as below)   Pool exit Able to safely return to lift chair in 3 ft using wall/rail, then floating with noodle (approx 2-3 ft) to chair.  I places feet on foot board.  Wears pants to assist with slide to lift chair then needs assist from aide to doff    Once out of pool:  Safely slide transfers lift chair to W/C on L (slightly uphill) with S, using head/hips relationship to lift and scoot buttocks (towel on seat to decrease friction).    Ankle Weights removed by staff.   Standing TE at wall/rail  Yes     Standing tolerance (achieving knee ext B without A)  Alt march high knees x 30+ reps  Hip ABD B x 30+ reps     Supine to stretch    UE with paddles    Walking 4 ft with noodle With S and B 1# ankle weights x 30 minutes.  x2 LOB self corrected with use of noodle and uses UEs to paddle to wall and recover to stand and continue walking      Lateral stepping In 4ft depth with noodle: x 5 min Cl S      GAIT TRAINING TOTAL TIME FOR SESSION Not performed       MANUAL TOTAL TIME FOR SESSION Not performed       MODALITIES TOTAL TIME FOR SESSION Not performed

## 2019-06-05 ENCOUNTER — HOSPITAL ENCOUNTER (OUTPATIENT)
Dept: PHYSICAL THERAPY | Facility: REHABILITATION | Age: 44
Setting detail: THERAPIES SERIES
Discharge: HOME | End: 2019-06-05
Attending: PHYSICAL MEDICINE & REHABILITATION
Payer: COMMERCIAL

## 2019-06-05 DIAGNOSIS — G71.02 FACIOSCAPULOHUMERAL MUSCULAR DYSTROPHY (CMS/HCC): Primary | ICD-10-CM

## 2019-06-05 DIAGNOSIS — R26.2 DIFFICULTY IN WALKING, NOT ELSEWHERE CLASSIFIED: ICD-10-CM

## 2019-06-05 PROCEDURE — 97113 AQUATIC THERAPY/EXERCISES: CPT | Mod: GP

## 2019-06-05 NOTE — PROGRESS NOTES
"PT PROGRESS NOTE FOR OUTPATIENT THERAPY    Patient: Sofia Valente   MRN: [id number]  : 1975 44 y.o.    Referring Physician: Dmitri Donald MD  Date of Visit: 2019      Certification Dates: 19 through 19    Recommended Frequency & Duration:  2 times/week for up to 8 weeks     Diagnosis:   1. Facioscapulohumeral muscular dystrophy    2. Difficulty in walking, not elsewhere classified        Chief Complaints: No chief complaint on file.      Precautions: fall    TODAY'S VISIT:          General Information - 19 1437        Session Details    Document Type re-evaluation    Mode of Treatment individual therapy;physical therapy    Patient/Family Observations Pt reports she is feeling stronger in the pool, except today, where she had more difficulty \"locking\" her knees to walk.    OP Specialty Neuro       Time Calculation    Start Time 1400    Stop Time 1455    Time Calculation (min) 55 min       General Information    Patient Profile Reviewed? yes    Existing Precautions/Restrictions fall              Pain/Vitals - 19 1437        Pain/Comfort/Sleep    Presence of Pain denies       Pain Intervention    Intervention  na denies pain     Post Intervention Comments na denies pain               Daily Falls Screen - 19 1439        Daily Falls Assessment    Patient reported fall since last visit No              Daily Treatment Assessment and Plan - 19 1443        Daily Treatment Assessment and Plan    Progress toward goals Progressing    Daily Outcome Summary see PT note for full clinical assessment     Plan and Recommendations Cont POC to progress to maintenance program           Today's Treatment::      Exercises Current Session Time   NEURO RE-ED TOTAL TIME FOR SESSION Not performed               Aquatics TOTAL TIME FOR SESSION 55 minutes   Pool entrance Via lift chair:  Pt I jackson and sets up for slide transfers L to R from power W/C to lift chair:  Slow, but S " only from PT.    D to place ankle weights on prior to pool entry (as allowed in maintenance aquatics).    Once lowered into the pool:  Floats over to the wall/rail using 1 noodle; using rail, navigates to 4 1/2 ft for standing TE at bar (as below)   Pool exit Able to safely return to lift chair in 3 ft using wall/rail, then floating with noodle (approx 2-3 ft) to chair.  Required Inés to place feet on foot board due to faitgue.  Wears pants to assist with slide to lift chair then needs assist from aide to doff    Once out of pool:  Safely slide transfers lift chair to W/C on L (slightly uphill) with S, using head/hips relationship to lift and scoot buttocks (towel on seat to decrease friction).    Inés to fully return to seated in C due pt report of fatigue     Ankle Weights removed by staff.   Standing TE at wall/rail  Yes   Standing tolerance / weight shifting   Alt march high knees x 30+ reps  Hip ABD B x 30+ reps  Manual cues to attempt to achieve / maintain LE knee extension  intermittently      Supine to stretch Floating supine with yellow neck pillow floatation device x 20 min   Using BUE to propel long length of of pool 4 pool lengths Cl S - no ankle wieghts     UE with paddles    Walking 4 ft with noodle With S and B 1# ankle weights 2 laps short side of pool  0 LOB     Lateral stepping In 4ft depth with noodle: x 5 min Cl S      GAIT TRAINING TOTAL TIME FOR SESSION Not performed       MANUAL TOTAL TIME FOR SESSION Not performed       MODALITIES TOTAL TIME FOR SESSION Not performed                 Goals Addressed     • PT GOALS                      Short Term Goals Time Frame Result Comment/Progress   Pt will direct aide to perform home stretching program    4-6 weeks Ongoing  6/5: Caregiver training set up for 7/1/19   Improve sitting functional reach test by > 1 inch in all directions   4-6 weeks TBA TBA at D/C on land    Pt will perform lateral transfer WC<> chair lift in/out of pool with S to return  to maintenance program   4-6 weeks  Ongoing  6/5: has performed Cl S, today required Inés   Pt will perform safe transfer wet WC <> mat table to change after aquatic therapy to return to maintenance program    4-6 weeks Met    Pt will walk in 4 ft deep water with 1# ankle weights with distant supervision to return to maintenance program.   4-6 weeks Ongoing  6/5: Requires Cl S currently    Pt will be independent with maintenance aquatic program .   4-6weeks Ongoing                   Clinical Impression:     Pt is a 40-year-old with PMH significant for facioscapulohumeral muscular dystrophy with tetraparesis, bilateral foot drop worse on the left than the right side, Crohn's disease, and celiac disease. Sofia has completed 7 outpatient PT visits, 4 aquatic and 3 on land.  She is demonstrating improving independence with mobility in the pool.  We are continuing to build her maintenance program.  Today, she required Inés today after feeling fatigued from report of a lot of activity this morning.   Plan to see pt in pool 2 more sessions to progress program / independence and 1 D/C session on land to train caregiver on stretching program.  Then return to aquatic maintenance program.

## 2019-06-05 NOTE — OP PT TREATMENT LOG
Exercises Current Session Time   NEURO RE-ED TOTAL TIME FOR SESSION Not performed               Aquatics TOTAL TIME FOR SESSION 55 minutes   Pool entrance Via lift chair:  Pt I jackson and sets up for slide transfers L to R from power W/C to lift chair:  Slow, but S only from PT.    D to place ankle weights on prior to pool entry (as allowed in maintenance aquatics).    Once lowered into the pool:  Floats over to the wall/rail using 1 noodle; using rail, navigates to 4 1/2 ft for standing TE at bar (as below)   Pool exit Able to safely return to lift chair in 3 ft using wall/rail, then floating with noodle (approx 2-3 ft) to chair.  Required Inés to place feet on foot board due to faitgue.  Wears pants to assist with slide to lift chair then needs assist from aide to doff    Once out of pool:  Safely slide transfers lift chair to W/C on L (slightly uphill) with S, using head/hips relationship to lift and scoot buttocks (towel on seat to decrease friction).    Inés to fully return to seated in PWC due pt report of fatigue     Ankle Weights removed by staff.   Standing TE at wall/rail  Yes   Standing tolerance / weight shifting   Alt march high knees x 30+ reps  Hip ABD B x 30+ reps  Manual cues to attempt to achieve / maintain LE knee extension  intermittently      Supine to stretch Floating supine with yellow neck pillow floatation device x 20 min   Using BUE to propel long length of of pool 4 pool lengths Cl S - no ankle wieghts     UE with paddles    Walking 4 ft with noodle With S and B 1# ankle weights 2 laps short side of pool  0 LOB     Lateral stepping In 4ft depth with noodle: x 5 min Cl S      GAIT TRAINING TOTAL TIME FOR SESSION Not performed       MANUAL TOTAL TIME FOR SESSION Not performed       MODALITIES TOTAL TIME FOR SESSION Not performed

## 2019-06-12 ENCOUNTER — HOSPITAL ENCOUNTER (OUTPATIENT)
Dept: PHYSICAL THERAPY | Facility: REHABILITATION | Age: 44
Setting detail: THERAPIES SERIES
Discharge: HOME | End: 2019-06-12
Attending: PHYSICAL MEDICINE & REHABILITATION
Payer: COMMERCIAL

## 2019-06-12 DIAGNOSIS — G71.02 FACIOSCAPULOHUMERAL MUSCULAR DYSTROPHY (CMS/HCC): Primary | ICD-10-CM

## 2019-06-12 PROCEDURE — 97113 AQUATIC THERAPY/EXERCISES: CPT | Mod: GP

## 2019-06-12 NOTE — OP PT TREATMENT LOG
Exercises Current Session Time   NEURO RE-ED TOTAL TIME FOR SESSION Not performed               Aquatics TOTAL TIME FOR SESSION 55 minutes   Pool entrance Via lift chair:  Pt I jackson and sets up for slide transfers L to R from power W/C to lift chair:  Slow, but S only from PT.    D to place ankle weights on prior to pool entry (as allowed in maintenance aquatics).    Once lowered into the pool:  Floats over to the wall/rail using 1 noodle; using rail, navigates to 4 1/2 ft for standing TE at bar (as below)   Pool exit Able to safely return to lift chair in 3 ft using wall/rail, then floating with one noodle  Wears pants to assist with slide to lift chair then needs assist from aide to doff    Once out of pool:  Safely slide transfers lift chair to W/C on L (slightly uphill) with S, using head/hips relationship to lift and scoot buttocks (towel on seat to decrease friction).    Supervision to fully return to seated in C      Ankle Weights removed by staff.   Standing TE at wall/rail  Yes   Standing tolerance / weight shifting   Alt march high knees x 30+ reps  Hip ABD B x 30+ reps  Shallower water  to attempt to achieve / maintain LE knee extension  intermittently      Supine to stretch Floating supine with yellow neck pillow floatation device x 20 min   Using BUE to propel long length of of pool 4 pool lengths Cl S - no ankle wieghts     UE with paddles    Walking 4 ft with noodle With S and B 1# ankle weights 2 laps short side of pool  0 LOB     Lateral stepping In 4ft depth with noodle: x 10 min Cl S (about 5 laps)     GAIT TRAINING TOTAL TIME FOR SESSION Not performed       MANUAL TOTAL TIME FOR SESSION Not performed       MODALITIES TOTAL TIME FOR SESSION Not performed

## 2019-06-12 NOTE — OP PT TREATMENT LOG
Exercises Current Session Time   NEURO RE-ED TOTAL TIME FOR SESSION Not performed               Aquatics TOTAL TIME FOR SESSION 55 minutes   Pool entrance Via lift chair:  Pt I jackson and sets up for slide transfers L to R from power W/C to lift chair:  Slow, but S only from PT.    D to place ankle weights on prior to pool entry (as allowed in maintenance aquatics).    Once lowered into the pool:  Floats over to the wall/rail using 1 noodle; using rail, navigates to 4 1/2 ft for standing TE at bar (as below)   Pool exit Able to safely return to lift chair in 3 ft using wall/rail, then floating with one noodle  Wears pants to assist with slide to lift chair then needs assist from aide to doff    Once out of pool:  Safely slide transfers lift chair to W/C on L (slightly uphill) with S, using head/hips relationship to lift and scoot buttocks (towel on seat to decrease friction).    Supervision to fully return to seated in [facility]      Ankle Weights removed by staff.   Standing TE at wall/rail  Yes   Standing tolerance / weight shifting   Alt march high knees x 30+ reps  Hip ABD B x 30+ reps  Shallower water  to attempt to achieve / maintain LE knee extension  intermittently      Supine to stretch Floating supine with yellow neck pillow floatation device x 15 min   Using BUE to propel long length of of pool 4 pool lengths Cl S - no ankle wieghts  Required assist of person in pool to remove ankle weights towards end of session to perform floating     UE with paddles    Walking 4 ft with noodle With S and B 1# ankle weights 2 laps short side of pool  0 LOB     Lateral stepping In 4ft depth with noodle: x 10 min Cl S (about 5 laps)     GAIT TRAINING TOTAL TIME FOR SESSION Not performed       MANUAL TOTAL TIME FOR SESSION Not performed       MODALITIES TOTAL TIME FOR SESSION Not performed

## 2019-06-12 NOTE — PROGRESS NOTES
PT DAILY NOTE FOR OUTPATIENT THERAPY    Patient: Sofia Valente   MRN: [id number]  : 1975 44 y.o.  Referring Physician: Dmitri Donald MD  Date of Visit: 2019      Certification Dates: 19 through 19    Diagnosis:   1. Facioscapulohumeral muscular dystrophy        Chief Complaints:   Chief Complaint   Patient presents with   • Dec Strength   • Decreased Endurance       Precautions: fall      TODAY'S VISIT          General Information - 19 1418        Session Details    Document Type daily treatment    Mode of Treatment individual therapy;physical therapy    Patient/Family Observations Somewhat fatigued today but did not want to cancel    OP Specialty Neuro       Time Calculation    Start Time 1400    Stop Time 1500    Time Calculation (min) 60 min       General Information    Patient Profile Reviewed? yes    Existing Precautions/Restrictions fall              Pain/Vitals - 19 1406        Pain/Comfort/Sleep    Presence of Pain denies       Pain Intervention    Intervention  none    Post Intervention Comments none              Daily Falls Screen - 19 1420        Daily Falls Assessment    Patient reported fall since last visit No              Daily Treatment Assessment and Plan - 19 1432        Daily Treatment Assessment and Plan    Progress toward goals Progressing    Daily Outcome Summary Practiced today with therapist not in the pool to ensure safety maneurvering and good decision making using equipment.  DId well with swimming and had insight of when to take rest periods.  Requires supervision when donning and doffing the neck support though able to prop LE at bar and use knees at wall to counterbalance any possible forwards LOB where she would lose control.  Getting weights on with staff while on lift prior to entering water.  THen need to figure out way for patient to remove weights if she is to float towards end of her session.      Plan and  Recommendations ONe more pool vists, then patient on vaction.  July 1 will DC and train aide on land based ROM program (2 copies).          OBJECTIVE DATA TAKEN TODAY:    None taken    Today's Treatment:      Exercises Current Session Time   NEURO RE-ED TOTAL TIME FOR SESSION Not performed               Aquatics TOTAL TIME FOR SESSION 55 minutes   Pool entrance Via lift chair:  Pt LOREE jackson and sets up for slide transfers L to R from power W/C to lift chair:  Slow, but S only from PT.    D to place ankle weights on prior to pool entry (as allowed in maintenance aquatics).    Once lowered into the pool:  Floats over to the wall/rail using 1 noodle; using rail, navigates to 4 1/2 ft for standing TE at bar (as below)   Pool exit Able to safely return to lift chair in 3 ft using wall/rail, then floating with one noodle  Wears pants to assist with slide to lift chair then needs assist from aide to doff    Once out of pool:  Safely slide transfers lift chair to W/C on L (slightly uphill) with S, using head/hips relationship to lift and scoot buttocks (towel on seat to decrease friction).    Supervision to fully return to seated in [facility]      Ankle Weights removed by staff.   Standing TE at wall/rail  Yes   Standing tolerance / weight shifting   Alt march high knees x 30+ reps  Hip ABD B x 30+ reps  Shallower water  to attempt to achieve / maintain LE knee extension  intermittently      Supine to stretch Floating supine with yellow neck pillow floatation device x 15 min   Using BUE to propel long length of of pool 4 pool lengths Cl S - no ankle wieghts  Required assist of person in pool to remove ankle weights towards end of session to perform floating     UE with paddles    Walking 4 ft with noodle With S and B 1# ankle weights 2 laps short side of pool  0 LOB     Lateral stepping In 4ft depth with noodle: x 10 min Cl S (about 5 laps)     GAIT TRAINING TOTAL TIME FOR SESSION Not performed       MANUAL TOTAL TIME FOR SESSION  Not performed       MODALITIES TOTAL TIME FOR SESSION Not performed

## 2019-06-18 ENCOUNTER — HOSPITAL ENCOUNTER (OUTPATIENT)
Dept: PHYSICAL THERAPY | Facility: REHABILITATION | Age: 44
Setting detail: THERAPIES SERIES
Discharge: HOME | End: 2019-06-18
Attending: PHYSICAL MEDICINE & REHABILITATION
Payer: COMMERCIAL

## 2019-06-18 DIAGNOSIS — G71.02 FACIOSCAPULOHUMERAL MUSCULAR DYSTROPHY (CMS/HCC): Primary | ICD-10-CM

## 2019-06-18 PROCEDURE — 97113 AQUATIC THERAPY/EXERCISES: CPT | Mod: GP

## 2019-06-18 NOTE — OP PT TREATMENT LOG
Exercises Current Session Time   NEURO RE-ED TOTAL TIME FOR SESSION Not performed               Aquatics TOTAL TIME FOR SESSION 55 minutes   Pool entrance Via lift chair:  Pt I jackson and sets up for slide transfers L to R from power W/C to lift chair:  Slow, but setup only from pool staff   D to place ankle weights on prior to pool entry (as allowed in maintenance aquatics).    Once lowered into the pool:  Floats over to the wall/rail using 1 noodle; using rail, navigates to 4 1/2 ft for standing TE at bar (as below)   Pool exit Able to safely return to lift chair in 3 ft using wall/rail, then floating with one noodle  Wears pants to assist with slide to lift chair then needs assist from aide to doff    Once out of pool:  Safely slide transfers lift chair to W/C on L (slightly uphill) with S, using head/hips relationship to lift and scoot buttocks (towel on seat to decrease friction).    Supervision from pool staff to fully return to seated in C       Standing TE at wall/rail  Yes   Standing tolerance / weight shifting   Alt march high knees x 30+ reps  Hip ABD B x 30+ reps  Shallower water  to attempt to achieve / maintain LE knee extension  intermittently      Supine to stretch  Yes Floating supine with yellow neck pillow floatation device x 15 min   Using BUE to propel long length of of pool 4 pool lengths:  6/17:  Pt able to  Don/doff yellow flotation pillow and remove ankle weights without assistance (neck float removed at 4 1/2 ft, ankle weights removed at 3 ft, both while holding to rail)     UE with paddles    Walking 4 1/2 to  4 ft with noodle  Yes With S and B 1# ankle weights multiple laps short side of pool  0 LOB     Lateral stepping  Yes In 4ft depth with noodle: x 10 min Cl S (about 5 laps)     GAIT TRAINING TOTAL TIME FOR SESSION Not performed       MANUAL TOTAL TIME FOR SESSION Not performed       MODALITIES TOTAL TIME FOR SESSION Not performed

## 2019-06-18 NOTE — PROGRESS NOTES
PT DAILY NOTE FOR OUTPATIENT THERAPY    Patient: Sofia Valente   MRN: [id number]  : 1975 44 y.o.  Referring Physician: Dmitri Donald MD  Date of Visit: 2019      Certification Dates: 19 through 19    Diagnosis:   1. Facioscapulohumeral muscular dystrophy        Chief Complaints: No chief complaint on file.      Precautions: fall      TODAY'S VISIT          General Information - 19 0906        Session Details    Document Type daily treatment    Mode of Treatment individual therapy;physical therapy    Patient/Family Observations Has more energy in the morning.  No new issues, no change in meds.    OP Specialty Neuro       Time Calculation    Start Time 0900    Stop Time 1000    Time Calculation (min) 60 min       General Information    Existing Precautions/Restrictions fall              Pain/Vitals - 19 0905        Pain/Comfort/Sleep    Presence of Pain denies       Pain Intervention    Intervention  none    Post Intervention Comments none              Daily Falls Screen - 19 0933        Daily Falls Assessment    Patient reported fall since last visit No              Daily Treatment Assessment and Plan - 19 1220        Daily Treatment Assessment and Plan    Progress toward goals Progressing    Daily Outcome Summary On this day, Sofia demonstrated no skilled help needed to enter/exit the pool and complete her pool program (including don/doff needed equipment, which includes ankle weights and yellow neck flotation device).      Plan and Recommendations f/u as outlined by primary PT          OBJECTIVE DATA TAKEN TODAY:        Today's Treatment:      Exercises Current Session Time   NEURO RE-ED TOTAL TIME FOR SESSION Not performed               Aquatics TOTAL TIME FOR SESSION 55 minutes   Pool entrance Via lift chair:  Pt I jackson and sets up for slide transfers L to R from power W/C to lift chair:  Slow, but setup only from pool staff   D to place ankle  weights on prior to pool entry (as allowed in maintenance aquatics).    Once lowered into the pool:  Floats over to the wall/rail using 1 noodle; using rail, navigates to 4 1/2 ft for standing TE at bar (as below)   Pool exit Able to safely return to lift chair in 3 ft using wall/rail, then floating with one noodle  Wears pants to assist with slide to lift chair then needs assist from aide to doff    Once out of pool:  Safely slide transfers lift chair to W/C on L (slightly uphill) with S, using head/hips relationship to lift and scoot buttocks (towel on seat to decrease friction).    Supervision from pool staff to fully return to seated in C       Standing TE at wall/rail  Yes   Standing tolerance / weight shifting   Alt march high knees x 30+ reps  Hip ABD B x 30+ reps  Shallower water  to attempt to achieve / maintain LE knee extension  intermittently      Supine to stretch  Yes Floating supine with yellow neck pillow floatation device x 15 min   Using BUE to propel long length of of pool 4 pool lengths:  6/17:  Pt able to  Don/doff yellow flotation pillow and remove ankle weights without assistance (neck float removed at 4 1/2 ft, ankle weights removed at 3 ft, both while holding to rail)     UE with paddles    Walking 4 1/2 to  4 ft with noodle  Yes With S and B 1# ankle weights multiple laps short side of pool  0 LOB     Lateral stepping  Yes In 4ft depth with noodle: x 10 min Cl S (about 5 laps)     GAIT TRAINING TOTAL TIME FOR SESSION Not performed       MANUAL TOTAL TIME FOR SESSION Not performed       MODALITIES TOTAL TIME FOR SESSION Not performed

## 2019-07-01 ENCOUNTER — HOSPITAL ENCOUNTER (OUTPATIENT)
Dept: PHYSICAL THERAPY | Facility: REHABILITATION | Age: 44
Setting detail: THERAPIES SERIES
Discharge: HOME | End: 2019-07-01
Attending: PHYSICAL MEDICINE & REHABILITATION
Payer: COMMERCIAL

## 2019-07-01 DIAGNOSIS — G82.52 QUADRIPLEGIA, C1-C4 INCOMPLETE (CMS/HCC): ICD-10-CM

## 2019-07-01 DIAGNOSIS — R26.2 DIFFICULTY IN WALKING, NOT ELSEWHERE CLASSIFIED: ICD-10-CM

## 2019-07-01 DIAGNOSIS — G71.02 FACIOSCAPULOHUMERAL MUSCULAR DYSTROPHY (CMS/HCC): Primary | ICD-10-CM

## 2019-07-01 PROCEDURE — 97140 MANUAL THERAPY 1/> REGIONS: CPT | Mod: GP

## 2019-07-01 PROCEDURE — 97530 THERAPEUTIC ACTIVITIES: CPT | Mod: 59

## 2019-07-01 NOTE — OP PT TREATMENT LOG
Exercises Current Session Time   NEURO RE-ED TOTAL TIME FOR SESSION Not performed       THER ACT TOTAL TIME FOR SESSION 38-52 Minutes (40)   Transfer training Dependent on 2 supine to longsit  Independent compensated LPT mat to PWC  Position change in bed modified independent at home with adaptive equipment prone, L S/L and supine transitions   HEP See media for final HEP including PROM with friend Cori ()  HS SLR, Hip IR, GS and soleus, pronation and supination, LTR, SKTC, DKTC.  See evaluation assessment for more info on safety dicussed.   Caregiver Ed Ed to caregiver on ROM program and positioning and transfers    DC ROM   THER EX TOTAL TIME FOR SESSION Not performed       GAIT TRAINING TOTAL TIME FOR SESSION Not performed       MANUAL TOTAL TIME FOR SESSION 8-22 Minutes (20)       MODALITIES TOTAL TIME FOR SESSION Not performed

## 2019-07-01 NOTE — PROGRESS NOTES
PT DISCHARGE NOTE FOR OUTPATIENT THERAPY    Patient: Sofia Valente   MRN: [id number]  : 1975 44 y.o.  Referring Physician: Dmitri Donald MD  Date of Visit: 2019      Certification Dates: 19 through 19    Recommended Frequency & Duration:  Other for up to 8 weeks     Total Visit Count: 10    Diagnosis:   1. Facioscapulohumeral muscular dystrophy    2. Difficulty in walking, not elsewhere classified    3. Quadriplegia, C1-C4 incomplete (CMS/HCC) (Formerly [facility])        Chief Complaints:   Chief Complaint   Patient presents with   • Dec Strength   • Pain   • Decreased Endurance       Precautions: fall    TODAY'S VISIT:          General Information - 19 1000        Session Details    Document Type discharge evaluation    Mode of Treatment individual therapy;physical therapy    OP Specialty Neuro       Time Calculation    Start Time 1000    Stop Time 1100    Time Calculation (min) 60 min       General Information    Patient Profile Reviewed? yes    Existing Precautions/Restrictions fall              Pain/Vitals - 19 1000        Pain Intervention    Intervention  none    Post Intervention Comments none              Daily Falls Screen - 19 1000        Daily Falls Assessment    Patient reported fall since last visit No            OBJECTIVE MEASUREMENTS/DATA:    Eval Assessment      ROM         Range of Motion - 19 1000        RIGHT: Lower Extremity PROM Assessment    Hip Flexion Deficit 120 degrees    Hip Abduction Deficit 28 degrees    Hip Internal Rotation Deficit 22 degrees    Knee Flexion Deficit 120    Knee Extension Deficit 0    Ankle Dorsiflexion Deficit -12 degrees       LEFT: Lower Extremity PROM Assessment    Hip Flexion Deficit 122 degrees    Hip Abduction Deficit 16 degrees   pain    Hip Internal Rotation Deficit 16 degrees    Hip External Rotation Deficit --   excessive, sits L LE crossed in chair    Knee Flexion Deficit 121    Knee Extension Deficit 0     Ankle Dorsiflexion Deficit -10 degrees          Today's Treatment:      Exercises Current Session Time   NEURO RE-ED TOTAL TIME FOR SESSION Not performed       THER ACT TOTAL TIME FOR SESSION 38-52 Minutes (40)   Transfer training Dependent on 2 supine to longsit  Independent compensated LPT mat to PWC  Position change in bed modified independent at home with adaptive equipment prone, L S/L and supine transitions   HEP See media for final HEP including PROM with friend Cori ()  HS SLR, Hip IR, GS and soleus, pronation and supination, LTR, SKTC, DKTC.  See evaluation assessment for more info on safety dicussed.   Caregiver Ed Ed to caregiver on ROM program and positioning and transfers    DC ROM   THER EX TOTAL TIME FOR SESSION Not performed       GAIT TRAINING TOTAL TIME FOR SESSION Not performed       MANUAL TOTAL TIME FOR SESSION 8-22 Minutes (20)       MODALITIES TOTAL TIME FOR SESSION Not performed               Goals Addressed     • PT GOALS                      Short Term Goals Time Frame Result Comment/Progress   Pt will direct aide to perform home stretching program    4-6 weeks Met 6/5: Caregiver training set up for 7/1/19 completed   Improve sitting functional reach test by > 1 inch in all directions   4-6 weeks Not met TBA at D/C on land    Pt will perform lateral transfer WC<> chair lift in/out of pool with S to return to maintenance program   4-6 weeks  Met    Pt will perform safe transfer wet WC <> mat table to change after aquatic therapy to return to maintenance program    4-6 weeks Met    Pt will walk in 4 ft deep water with 1# ankle weights with distant supervision to return to maintenance program.   4-6 weeks Met Staff to help with donning # and also supervise when donning and doffing cervical support for supine Loki   Pt will be independent with maintenance aquatic program .   4-6weeks Met  To start this week                   Discharge information for [facility]:    Reason for D/C:  Goals met  Hospitalization during treatment: Yes  Increased independence: [place] in HEP  Increased production assessment: Increased community independence, Return to work/school/volunteer activities, Return to household activities, Return to participation in hobbies/leisure pursuits  Interrupted for medical reason: No  Skin integrity: Intact

## 2019-08-15 ENCOUNTER — APPOINTMENT (OUTPATIENT)
Dept: REHABILITATION | Facility: REHABILITATION | Age: 44
End: 2019-08-15
Attending: INTERNAL MEDICINE

## 2019-08-15 PROCEDURE — 99000054 HC AQUATICS 10 VISTS

## 2019-11-05 ENCOUNTER — TRANSCRIBE ORDERS (OUTPATIENT)
Dept: SCHEDULING | Age: 44
End: 2019-11-05

## 2019-11-05 DIAGNOSIS — Z12.31 ENCOUNTER FOR SCREENING MAMMOGRAM FOR MALIGNANT NEOPLASM OF BREAST: Primary | ICD-10-CM

## 2019-11-13 ENCOUNTER — HOSPITAL ENCOUNTER (OUTPATIENT)
Dept: PHYSICAL MEDICINE AND REHAB | Facility: REHABILITATION | Age: 44
Discharge: HOME | End: 2019-11-13

## 2019-11-14 ENCOUNTER — HOSPITAL ENCOUNTER (OUTPATIENT)
Dept: RADIOLOGY | Age: 44
Discharge: HOME | End: 2019-11-14
Attending: NURSE PRACTITIONER
Payer: COMMERCIAL

## 2019-11-14 DIAGNOSIS — Z12.31 ENCOUNTER FOR SCREENING MAMMOGRAM FOR MALIGNANT NEOPLASM OF BREAST: ICD-10-CM

## 2019-11-14 PROCEDURE — 77067 SCR MAMMO BI INCL CAD: CPT

## 2019-11-18 ENCOUNTER — TRANSCRIBE ORDERS (OUTPATIENT)
Dept: RADIOLOGY | Age: 44
End: 2019-11-18

## 2019-11-18 DIAGNOSIS — R92.8 OTHER ABNORMAL AND INCONCLUSIVE FINDINGS ON DIAGNOSTIC IMAGING OF BREAST: Primary | ICD-10-CM

## 2019-11-29 ENCOUNTER — HOSPITAL ENCOUNTER (OUTPATIENT)
Dept: RADIOLOGY | Age: 44
Discharge: HOME | End: 2019-11-29
Attending: RADIOLOGY
Payer: COMMERCIAL

## 2019-11-29 DIAGNOSIS — R92.8 OTHER ABNORMAL AND INCONCLUSIVE FINDINGS ON DIAGNOSTIC IMAGING OF BREAST: ICD-10-CM

## 2019-11-29 PROCEDURE — 76642 ULTRASOUND BREAST LIMITED: CPT | Mod: RT

## 2019-11-29 PROCEDURE — G0279 TOMOSYNTHESIS, MAMMO: HCPCS | Mod: RT

## 2020-01-08 ENCOUNTER — TRANSCRIBE ORDERS (OUTPATIENT)
Dept: SCHEDULING | Facility: REHABILITATION | Age: 45
End: 2020-01-08

## 2020-01-08 DIAGNOSIS — G71.00 MUSCULAR DYSTROPHIES (CMS/HCC): Primary | ICD-10-CM

## 2020-02-13 ENCOUNTER — HOSPITAL ENCOUNTER (OUTPATIENT)
Dept: PHYSICAL THERAPY | Facility: REHABILITATION | Age: 45
Setting detail: THERAPIES SERIES
Discharge: HOME | End: 2020-02-13
Attending: PHYSICAL MEDICINE & REHABILITATION
Payer: COMMERCIAL

## 2020-02-13 DIAGNOSIS — G71.02 FACIOSCAPULOHUMERAL MUSCULAR DYSTROPHY (CMS/HCC): Primary | ICD-10-CM

## 2020-02-13 DIAGNOSIS — G71.00 MUSCULAR DYSTROPHIES (CMS/HCC): ICD-10-CM

## 2020-02-13 PROCEDURE — 97542 WHEELCHAIR MNGMENT TRAINING: CPT | Mod: GP

## 2020-02-13 PROCEDURE — 97163 PT EVAL HIGH COMPLEX 45 MIN: CPT

## 2020-02-13 NOTE — Clinical Note
[address] [place] [address]  [phone]      Dear DR. Choudhury,      Thank you for this referral. Please review the attached notes and plan of care for your approval.  Please contact our department with any questions.     Sincerely,     April Thomas, PT    [facility]   PT/OT EVALUATION FOR MOBILITY-ASSISTIVE DEVICE    Medicare Provider No. : [id number]                               Patient: Sofia Valente   MRN: [id number]  : 1975   Referring Physician: Pepe Choudhury DO  Insurance Company: [facility]    Start of Care/Evaluation Date: 20   Certification Dates: 20 through 20    Diagnosis:   1. Facioscapulohumeral muscular dystrophy (CMS/HCC)    2. Muscular dystrophies (CMS/HCC)        Treatment Diagnosis: Gait Dysfunction    GOALS  Long Term Goals Time Frame Result Comment/Progress   Evaluate mobility and need for wheelchair 24 weeks       Access to Mobility-related ADL’s within the home 24 weeks          _X__The patient and caregiver were involved in the goal-setting and are in agreement with the proposed plan.       ASSESSMENT:  Sofia Valente is a 45 y.o. female with a diagnosis of Fascioscapulohumeral muscular dystrophy who does not walk and requires a power wheelchair for her mobility and access to mobility related ADLs within her home.     Frequency and duration of treatment: 20,  10:30 AM-12:00 PM    PLAN OF CARE  Second visit with with DME representative for equipment trials and to develop wheelchair recommendations  Pursue documentation and funding for new equipment  Follow up at [facility] once new equipment has arrived  1-5 visits for equipment trials and/or training      LEARNING ASSESSMENT    Assessment completed: Yes    Learner name:  Sofia    Relationship: Patient    Learning Barriers:  Learning barriers: No Barriers    Preferred Language: English     Needed: No    Learning New Concepts: Listening, Reading, Demonstration  and Pictures/Video      CO-LEARNER ASSESSMENT:    Completed: No            Medical history  Past Medical History:   Diagnosis Date   • CD (Crohn's disease) (CMS/[facility])    • Celiac disease    • FSHD (facioscapulohumeral muscular dystrophy) (CMS/[facility])    • Tibial fracture     right tibia transverse fracture   • Urinary incontinence        Past Surgical History:   Procedure Laterality Date   • APPENDECTOMY     • BOWEL RESECTION     • OTHER SURGICAL HISTORY Right     Scapulothoracic fusion        Height: 167 cm  Weight:  65.9 kg    Social Black lives with her  in an accessible home with a ramp to enter.  Her  works full time.      History of falls and high falls risk currently      Neuromuscular Status   Tetraplegia 2nd to FSH.  Right shoulder ROM severely limited due to history of scapular fusion to ribcage.  Upper extremity ROM Right Left   Shoulder flex  PROM to 40 degrees PROM to 50 degrees   Shoulder abd  AROM: 30 degrees AROM: 45 degrees   Elbow flex Full PROM Full PROM   Elbow ext Full PROM Full PROM   Wrist flex Full PROM Full PROM   Wrist ext Full PROM Full PROM   Fingers WNL WNL     Lower extremity ROM Right Left   Hip flex Hip flexion contracture Hip flexion contracture   Hip abd NT NT   Knee ext WNL WNL   Knee flex WNL WNL   Ankle DF WNL WNL   Ankle PF WNL WNL     Upper extremity strength Right Left   Shoulder flex  2-/5 2-/5   Shoulder abd  2-/5 2-/5   Elbow flex 2+/5 2-/5   Elbow ext 1+ to 2-/5 2-/5   Wrist flex 3/5 3/5   Wrist ext 3/5 3/5   Fingers Diminished  strength  Diminished  strength     Lower extremity strength Right Left   Hip flex 1+/5 1+/5   Hip abd 1+/5 1+/5   Knee ext 1+/5 1+/5   Knee flex 1+/5 1+/5   Ankle DF 0/5 0/5   Ankle PF 2-/5 2-/5   Cervical strength: Weakness of shoulder girdle and cervical flexors/extensors  Posture: Mild abduction with external rotation of bilateral hips, anterior pelvic tilt with severe lumbar lordosis, diminished spinal curves in throacic  and cervical spine, scoliosis with left lateral trunk lean, asymmetrical shoulder height (R elevated compared to L), severe winging of left scapula, head in midline.  Trunk strength is poor and she relies on dave alignment for stability due to proximal weakness of trunk and pelvis  Tone: No abnormal tone present  Sensation: Grossly intact   Skin integrity: Intact  Edema: Dependent edema in bilateral feet and lower legs due to lack of active muscle pump action      Pain  Severe joint degeneration of her R shoulder due to history of scapular fusion causing 7/10 pain on the right shoulder with use; she received injections for pain management which have had minimal effect on her pain levels      Activities of Daily Living (ADLs) Level of [place]   Toileting MOD I from elevated [facility] with use of transfer board and head and trunk rocking for momentum to laterally shift her hips along the transfer board and upper extremity support on grab bars for stability.  Significantly increased time and effort to complete the transfer.  If she is unable to adjust the seat height of the power wheelchair she is dependent for toilet transfers.  She is continent of her bowel and bladder.     Bathing MOD I from [facility] with use of transfer board and head and trunk rocking for momentum to laterally shift her hips along the transfer board and upper extremity support on grab bars for stability to the mounted shower seat.  Significantly increased time and effort to complete the transfer.  If she is unable to adjust the seat height of the power wheelchair she is dependent for transfer to her shower seat   Dressing Sofia dresses in her standing frame with significantly increased time and effort to complete, she is dependent for any other method of dressing   Grooming MOD I from seated in the [facility] with a specific set up where she can prop on her elbows on a surface in front of her to reach her face/head   Feeding Set-up for feeding,  "meals are provided   Transfers MOD I from elevated [facility] with use of transfer board and head and trunk rocking for momentum to laterally shift her hips along the transfer board and upper extremity support on armrests of PW.  To transfer back to the [facility] from a surface she requires to utilize the power seat elevator to lower the PWC seat to perform a downhill transfer with the transfer board and the same method.  Without the ability to adjust the seat height Sofia is dependent for all transfers due to weakness from her FSH MD.     Bed Mobility Dependent   Ambulation Unable to stand or ambulate due to tetraplegic weakness and impaired postural control due to her FSH MD   Wheelchair mobility MOD I for R sided joystick PW driving in her current Quantum 610          Current Wheelchair: Quantum 610 mid wheel drive [facility] with 11\" [employer] seat elevator (SN # [id number]) which was delivered in December of 2013.  This power wheelchair base is rusted and damaged beyond repair and requires replacement, this power wheelchair also has a significant repair and replacement part history due to heavy use by Sofia.  Additionally she has a general foam cushion with a vinyl cover which is important because she needs to have a slippery surface to move across to improve her ease of transfers.      Due to her fascioscapulohumeral muscular dystrophy (FSH MD), Sofia has a mobility deficit that cannot be remediated with a cane or walker as she cannot stand or walk due to tetraplegic weakness due to her FSH MD.  Sofia is unable to utilize an optimally configured manual wheelchair due to severe scapular, shoulder girdle, and trunk weakness due to her FSH MD.  Sofia would be unable to transfer on or off a scooter seat, the seat would not sufficiently support the postural asymmetries of the trunk, and would be unable to drive effectively with a tiller control due to proximal weakness in her shoulder girdle, trunk, and upper " extremities.  Sofia requires a power wheelchair with power seat functions to move around within the home safely and independently change position for skin pressure management and repositioning/comfort. Sofia requires a power wheelchair to access the bedroom for sleeping, the bathroom for toileting and bathing, and the kitchen and eating area for meals. During the evaluation, Sofia demonstrated the ability to safely drive a power wheelchair with a right joystick control in a straight line in open hallway environment, navigate through 36” doorways, back up and turn in place in an indoor environment. During this evaluation Sofia was shown the [employer] A3 standing power wheelchair and the [employer] X3 midwheel drive PWC.  She has very specific measurements to allow her access in her home including her bedroom, bathroom, and kitchen as well as accessibility in her van to transfer to the 's seat to drive.  She is very adamant that they base of the power wheelchair cannot be wide because she needs to be able to position her PWC seat directly next to the surface she is transferring to because of her high risk for falls with transfers due to tetraplegic weakness, impaired trunk strength and pain in her right shoulder due to her FSH MD. She would also like to trial the Quantum Stretto PWC which has a very narrow base and this will be available at her next appointment for trial.      SARMAD Mckinney of [employer] was present at this evaluation and participated in the selection of the equipment.  [facility] has no financial relationship with this DME company.

## 2020-02-13 NOTE — PROGRESS NOTES
[facility]   PT/OT EVALUATION FOR MOBILITY-ASSISTIVE DEVICE    Medicare Provider No. : [id number]                               Patient: Sofia Valente   MRN: [id number]  : 1975   Referring Physician: Pepe Choudhury DO  Insurance Company: [facility]    Start of Care/Evaluation Date: 20   Certification Dates: 20 through 20    Diagnosis:   1. Facioscapulohumeral muscular dystrophy (CMS/HCC)    2. Muscular dystrophies (CMS/HCC)        Treatment Diagnosis: Gait Dysfunction    GOALS  Long Term Goals Time Frame Result Comment/Progress   Evaluate mobility and need for wheelchair 24 weeks       Access to Mobility-related ADL’s within the home 24 weeks          _X__The patient and caregiver were involved in the goal-setting and are in agreement with the proposed plan.       ASSESSMENT:  Sofia Valente is a 45 y.o. female with a diagnosis of Fascioscapulohumeral muscular dystrophy who does not walk and requires a power wheelchair for her mobility and access to mobility related ADLs within her home.     Frequency and duration of treatment: 20,  10:30 AM-12:00 PM    PLAN OF CARE  Second visit with with DME representative for equipment trials and to develop wheelchair recommendations  Pursue documentation and funding for new equipment  Follow up at [facility] once new equipment has arrived  1-5 visits for equipment trials and/or training      LEARNING ASSESSMENT    Assessment completed: Yes    Learner name:  Sofia    Relationship: Patient    Learning Barriers:  Learning barriers: No Barriers    Preferred Language: English     Needed: No    Learning New Concepts: Listening, Reading, Demonstration and Pictures/Video      CO-LEARNER ASSESSMENT:    Completed: No            Medical history  Past Medical History:   Diagnosis Date   • CD (Crohn's disease) (CMS/HCC)    • Celiac disease    • FSHD (facioscapulohumeral muscular dystrophy) (CMS/HCC)    • Tibial fracture      right tibia transverse fracture   • Urinary incontinence        Past Surgical History:   Procedure Laterality Date   • APPENDECTOMY     • BOWEL RESECTION     • OTHER SURGICAL HISTORY Right     Scapulothoracic fusion        Height: 167 cm  Weight:  65.9 kg    Social Black lives with her  in an accessible home with a ramp to enter.  Her  works full time.      History of falls and high falls risk currently      Neuromuscular Status   Tetraplegia 2nd to FSH.  Right shoulder ROM severely limited due to history of scapular fusion to ribcage.  Upper extremity ROM Right Left   Shoulder flex  PROM to 40 degrees PROM to 50 degrees   Shoulder abd  AROM: 30 degrees AROM: 45 degrees   Elbow flex Full PROM Full PROM   Elbow ext Full PROM Full PROM   Wrist flex Full PROM Full PROM   Wrist ext Full PROM Full PROM   Fingers WNL WNL     Lower extremity ROM Right Left   Hip flex Hip flexion contracture Hip flexion contracture   Hip abd NT NT   Knee ext WNL WNL   Knee flex WNL WNL   Ankle DF WNL WNL   Ankle PF WNL WNL     Upper extremity strength Right Left   Shoulder flex  2-/5 2-/5   Shoulder abd  2-/5 2-/5   Elbow flex 2+/5 2-/5   Elbow ext 1+ to 2-/5 2-/5   Wrist flex 3/5 3/5   Wrist ext 3/5 3/5   Fingers Diminished  strength  Diminished  strength     Lower extremity strength Right Left   Hip flex 1+/5 1+/5   Hip abd 1+/5 1+/5   Knee ext 1+/5 1+/5   Knee flex 1+/5 1+/5   Ankle DF 0/5 0/5   Ankle PF 2-/5 2-/5   Cervical strength: Weakness of shoulder girdle and cervical flexors/extensors  Posture: Mild abduction with external rotation of bilateral hips, anterior pelvic tilt with severe lumbar lordosis, diminished spinal curves in throacic and cervical spine, scoliosis with left lateral trunk lean, asymmetrical shoulder height (R elevated compared to L), severe winging of left scapula, head in midline.  Trunk strength is poor and she relies on dave alignment for stability due to proximal weakness of  trunk and pelvis  Tone: No abnormal tone present  Sensation: Grossly intact   Skin integrity: Intact  Edema: Dependent edema in bilateral feet and lower legs due to lack of active muscle pump action      Pain  Severe joint degeneration of her R shoulder due to history of scapular fusion causing 7/10 pain on the right shoulder with use; she received injections for pain management which have had minimal effect on her pain levels      Activities of Daily Living (ADLs) Level of [place]   Toileting MOD I from elevated [facility] with use of transfer board and head and trunk rocking for momentum to laterally shift her hips along the transfer board and upper extremity support on grab bars for stability.  Significantly increased time and effort to complete the transfer.  If she is unable to adjust the seat height of the power wheelchair she is dependent for toilet transfers.  She is continent of her bowel and bladder.     Bathing MOD I from [facility] with use of transfer board and head and trunk rocking for momentum to laterally shift her hips along the transfer board and upper extremity support on grab bars for stability to the mounted shower seat.  Significantly increased time and effort to complete the transfer.  If she is unable to adjust the seat height of the power wheelchair she is dependent for transfer to her shower seat   Dressing Sofia dresses in her standing frame with significantly increased time and effort to complete, she is dependent for any other method of dressing   Grooming MOD I from seated in the [facility] with a specific set up where she can prop on her elbows on a surface in front of her to reach her face/head   Feeding Set-up for feeding, meals are provided   Transfers MOD I from elevated [facility] with use of transfer board and head and trunk rocking for momentum to laterally shift her hips along the transfer board and upper extremity support on armrests of [facility].  To transfer back to the [facility] from a surface  "she requires to utilize the power seat elevator to lower the PWC seat to perform a downhill transfer with the transfer board and the same method.  Without the ability to adjust the seat height Sofia is dependent for all transfers due to weakness from her FSH MD.     Bed Mobility Dependent   Ambulation Unable to stand or ambulate due to tetraplegic weakness and impaired postural control due to her FSH MD   Wheelchair mobility MOD I for R sided joystick PWC driving in her current Quantum 610          Current Wheelchair: Quantum 610 mid wheel drive PWC with 11\" [employer] seat elevator (SN # [id number]) which was delivered in December of 2013.  This power wheelchair base is rusted and damaged beyond repair and requires replacement, this power wheelchair also has a significant repair and replacement part history due to heavy use by Sofia.  Additionally she has a general foam cushion with a vinyl cover which is important because she needs to have a slippery surface to move across to improve her ease of transfers.      Due to her fascioscapulohumeral muscular dystrophy (FSH MD), Sofia has a mobility deficit that cannot be remediated with a cane or walker as she cannot stand or walk due to tetraplegic weakness due to her FSH MD.  Sofia is unable to utilize an optimally configured manual wheelchair due to severe scapular, shoulder girdle, and trunk weakness due to her FSH MD.  Sofia would be unable to transfer on or off a scooter seat, the seat would not sufficiently support the postural asymmetries of the trunk, and would be unable to drive effectively with a tiller control due to proximal weakness in her shoulder girdle, trunk, and upper extremities.  Sofia requires a power wheelchair with power seat functions to move around within the home safely and independently change position for skin pressure management and repositioning/comfort. Sofia requires a power wheelchair to access the bedroom for " sleeping, the bathroom for toileting and bathing, and the kitchen and eating area for meals. During the evaluation, Sofia demonstrated the ability to safely drive a power wheelchair with a right joystick control in a straight line in open hallway environment, navigate through 36” doorways, back up and turn in place in an indoor environment. During this evaluation Sofia was shown the [employer] A3 standing power wheelchair and the [employer] X3 midwheel drive PWC.  She has very specific measurements to allow her access in her home including her bedroom, bathroom, and kitchen as well as accessibility in her van to transfer to the 's seat to drive.  She is very adamant that they base of the power wheelchair cannot be wide because she needs to be able to position her PWC seat directly next to the surface she is transferring to because of her high risk for falls with transfers due to tetraplegic weakness, impaired trunk strength and pain in her right shoulder due to her FSH MD. She would also like to trial the Quantum Stretto PWC which has a very narrow base and this will be available at her next appointment for trial.      SARMAD Mckinney of [employer] was present at this evaluation and participated in the selection of the equipment.  [facility] has no financial relationship with this [employer] company.

## 2020-02-13 NOTE — LETTER
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     By signing this plan of care, I certify this plan of care as correct and necessary for the patient.        Physician Signature: _________________________________________ Date: _________________    Thank you for this referral. Please contact our department with any questions.      April Thomas, PT    [facility]   PT/OT EVALUATION FOR MOBILITY-ASSISTIVE DEVICE    Medicare Provider No. : [id number]                               Patient: Sofia Valente   MRN: [id number]  : 1975   Referring Physician: Pepe Choudhury DO  Insurance Company: [facility]    Start of Care/Evaluation Date: 20   Certification Dates: 20 through 20    Diagnosis:   1. Facioscapulohumeral muscular dystrophy (CMS/HCC)    2. Muscular dystrophies (CMS/HCC)        Treatment Diagnosis: Gait Dysfunction    GOALS  Long Term Goals Time Frame Result Comment/Progress   Evaluate mobility and need for wheelchair 24 weeks       Access to Mobility-related ADL’s within the home 24 weeks          _X__The patient and caregiver were involved in the goal-setting and are in agreement with the proposed plan.       ASSESSMENT:  Sofia Valente is a 45 y.o. female with a diagnosis of Fascioscapulohumeral muscular dystrophy who does not walk and requires a power  wheelchair for her mobility and access to mobility related ADLs within her home.     Frequency and duration of treatment: 02/13/20,  10:30 AM-12:00 PM    PLAN OF CARE  Second visit with with DME representative for equipment trials and to develop wheelchair recommendations  Pursue documentation and funding for new equipment  [facility] at [facility] once new equipment has arrived  1-5 visits for equipment trials and/or training      LEARNING ASSESSMENT    Assessment completed: Yes    Learner name:  Sofia    Relationship: Patient    Learning Barriers:  Learning barriers: No Barriers    Preferred Language: English     Needed: No    Learning New Concepts: Listening, Reading, Demonstration and Pictures/Video      CO-LEARNER ASSESSMENT:    Completed: No            Medical history  Past Medical History:   Diagnosis Date   • CD (Crohn's disease) (CMS/[facility])    • Celiac disease    • FSHD (facioscapulohumeral muscular dystrophy) (CMS/[facility])    • Tibial fracture     right tibia transverse fracture   • Urinary incontinence        Past Surgical History:   Procedure Laterality Date   • APPENDECTOMY     • BOWEL RESECTION     • OTHER SURGICAL HISTORY Right     Scapulothoracic fusion        Height: 167 cm  Weight:  65.9 kg    Social Black lives with her  in an accessible home with a ramp to enter.  Her  works full time.      History of falls and high falls risk currently      Neuromuscular Status   Tetraplegia 2nd to FSH.  Right shoulder ROM severely limited due to history of scapular fusion to ribcage.  Upper extremity ROM Right Left   Shoulder flex  PROM to 40 degrees PROM to 50 degrees   Shoulder abd  AROM: 30 degrees AROM: 45 degrees   Elbow flex Full PROM Full PROM   Elbow ext Full PROM Full PROM   Wrist flex Full PROM Full PROM   Wrist ext Full PROM Full PROM   Fingers WNL WNL     Lower extremity ROM Right Left   Hip flex Hip flexion contracture Hip flexion contracture   Hip abd NT NT   Knee ext WNL WNL    Knee flex WNL WNL   Ankle DF WNL WNL   Ankle PF WNL WNL     Upper extremity strength Right Left   Shoulder flex  2-/5 2-/5   Shoulder abd  2-/5 2-/5   Elbow flex 2+/5 2-/5   Elbow ext 1+ to 2-/5 2-/5   Wrist flex 3/5 3/5   Wrist ext 3/5 3/5   Fingers Diminished  strength  Diminished  strength     Lower extremity strength Right Left   Hip flex 1+/5 1+/5   Hip abd 1+/5 1+/5   Knee ext 1+/5 1+/5   Knee flex 1+/5 1+/5   Ankle DF 0/5 0/5   Ankle PF 2-/5 2-/5   Cervical strength: Weakness of shoulder girdle and cervical flexors/extensors  Posture: Mild abduction with external rotation of bilateral hips, anterior pelvic tilt with severe lumbar lordosis, diminished spinal curves in throacic and cervical spine, scoliosis with left lateral trunk lean, asymmetrical shoulder height (R elevated compared to L), severe winging of left scapula, head in midline.  Trunk strength is poor and she relies on dave alignment for stability due to proximal weakness of trunk and pelvis  Tone: No abnormal tone present  Sensation: Grossly intact   Skin integrity: Intact  Edema: Dependent edema in bilateral feet and lower legs due to lack of active muscle pump action      Pain  Severe joint degeneration of her R shoulder due to history of scapular fusion causing 7/10 pain on the right shoulder with use; she received injections for pain management which have had minimal effect on her pain levels      Activities of Daily Living (ADLs) Level of [place]   Toileting MOD I from elevated PWC with use of transfer board and head and trunk rocking for momentum to laterally shift her hips along the transfer board and upper extremity support on grab bars for stability.  Significantly increased time and effort to complete the transfer.  If she is unable to adjust the seat height of the power wheelchair she is dependent for toilet transfers.  She is continent of her bowel and bladder.     Bathing MOD I from elevated PWC with use of transfer  "board and head and trunk rocking for momentum to laterally shift her hips along the transfer board and upper extremity support on grab bars for stability to the mounted shower seat.  Significantly increased time and effort to complete the transfer.  If she is unable to adjust the seat height of the power wheelchair she is dependent for transfer to her shower seat   Dressing Sofia dresses in her standing frame with significantly increased time and effort to complete, she is dependent for any other method of dressing   Grooming MOD I from seated in the [facility] with a specific set up where she can prop on her elbows on a surface in front of her to reach her face/head   Feeding Set-up for feeding, meals are provided   Transfers MOD I from elevated PW with use of transfer board and head and trunk rocking for momentum to laterally shift her hips along the transfer board and upper extremity support on armrests of PWC.  To transfer back to the [facility] from a surface she requires to utilize the power seat elevator to lower the PWC seat to perform a downhill transfer with the transfer board and the same method.  Without the ability to adjust the seat height Sofia is dependent for all transfers due to weakness from her FSH MD.     Bed Mobility Dependent   Ambulation Unable to stand or ambulate due to tetraplegic weakness and impaired postural control due to her FSH MD   Wheelchair mobility MOD I for R sided joystick [facility] driving in her current Quantum 610          Current Wheelchair: Quantum 610 mid wheel drive [facility] with 11\" [employer] seat elevator (SN # [id number]) which was delivered in December of 2013.  This power wheelchair base is rusted and damaged beyond repair and requires replacement, this power wheelchair also has a significant repair and replacement part history due to heavy use by Sofia.  Additionally she has a general foam cushion with a vinyl cover which is important because she needs to have a slippery " surface to move across to improve her ease of transfers.      Due to her fascioscapulohumeral muscular dystrophy (FSH MD), Sofia has a mobility deficit that cannot be remediated with a cane or walker as she cannot stand or walk due to tetraplegic weakness due to her FSH MD.  Sofia is unable to utilize an optimally configured manual wheelchair due to severe scapular, shoulder girdle, and trunk weakness due to her FSH MD.  Sofia would be unable to transfer on or off a scooter seat, the seat would not sufficiently support the postural asymmetries of the trunk, and would be unable to drive effectively with a tiller control due to proximal weakness in her shoulder girdle, trunk, and upper extremities.  Sofia requires a power wheelchair with power seat functions to move around within the home safely and independently change position for skin pressure management and repositioning/comfort. Sofia requires a power wheelchair to access the bedroom for sleeping, the bathroom for toileting and bathing, and the kitchen and eating area for meals. During the evaluation, Sofia demonstrated the ability to safely drive a power wheelchair with a right joystick control in a straight line in open hallway environment, navigate through 36” doorways, back up and turn in place in an indoor environment. During this evaluation Sofia was shown the Rovi A3 standing power wheelchair and the Rovi X3 midwheel drive PWC.  She has very specific measurements to allow her access in her home including her bedroom, bathroom, and kitchen as well as accessibility in her van to transfer to the 's seat to drive.  She is very adamant that they base of the power wheelchair cannot be wide because she needs to be able to position her PWC seat directly next to the surface she is transferring to because of her high risk for falls with transfers due to tetraplegic weakness, impaired trunk strength and pain in her right shoulder due to her FSH  MD. She would also like to trial the Quantum Stretto PWC which has a very narrow base and this will be available at her next appointment for trial.      SARMAD Mckinney of [employer] was present at this evaluation and participated in the selection of the equipment.  [facility] has no financial relationship with this DME company.

## 2020-03-02 ENCOUNTER — HOSPITAL ENCOUNTER (OUTPATIENT)
Dept: PHYSICAL THERAPY | Facility: REHABILITATION | Age: 45
Setting detail: THERAPIES SERIES
Discharge: HOME | End: 2020-03-02
Attending: PHYSICAL MEDICINE & REHABILITATION
Payer: COMMERCIAL

## 2020-03-02 DIAGNOSIS — G71.02 FACIOSCAPULOHUMERAL MUSCULAR DYSTROPHY (CMS/HCC): Primary | ICD-10-CM

## 2020-03-02 PROCEDURE — 97542 WHEELCHAIR MNGMENT TRAINING: CPT | Mod: GP

## 2020-03-02 NOTE — PROGRESS NOTES
PT DAILY NOTE FOR OUTPATIENT THERAPY    Patient: Sofia Valente   MRN: [id number]  : 1975 45 y.o.  Referring Physician: Pepe Choudhury DO  Date of Visit: 3/2/2020      Certification Dates: 20 through 20    Diagnosis:   1. Facioscapulohumeral muscular dystrophy (CMS/HCC)        Chief Complaints:   Chief Complaint   Patient presents with   • Immobility   • Dec Strength   • Decreased Trunk Control   • Dec ROM       Precautions: fall      TODAY'S VISIT    General Information - 20 1250        Session Details    Document Type  daily treatment     Mode of Treatment  individual therapy;physical therapy     Patient/Family/Caregiver Comments/Observations  Sofia is excited to see the [employer] PWC today, she reports very specific dimensions for her new PWC if possible     OP Specialty  Wheelchair        Time Calculation    Start Time  1230     Stop Time  1430     Time Calculation (min)  120 min        General Information    Patient Profile Reviewed?  yes     Referring Physician  Dr. Pepe Choudhury     Pertinent History of Current Functional Problem  FSH MD     Existing Precautions/Restrictions  fall         Pain/Vitals - 20 1249        Pain Assessment    Currently in pain  Yes     Preferred Pain Scale  word (verbal rating pain scale)     Pain Body Location - Side  Right     Pain: Body location  Shoulder     Pain Rating (word): Rest  2 - mild pain     Pain Rating (word): Activity  6 - moderate-severe pain     Pain Rating (word): Post Activity  2 - mild pain     Pain Radiation to  arm, right     Frequency  frequent     Quality  aching;stabbing;throbbing        Pain Intervention    Intervention   assistance with transfers, distraction     Post Intervention Comments  none         Daily Falls Screen - 20 1402        Daily Falls Assessment    Patient reported fall since last visit  No         Daily Treatment Assessment and Plan - 20 1557        Daily Treatment Assessment and  Plan    Progress toward goals  Progressing     Daily Outcome Summary  Specs and measurements completed for the Quantum Stretto power wheelchair.  Full LMN to follow.     Plan and Recommendations  Return for fitting and delivery at [facility] once equipment is received             Today's Treatment:      Sofia was seen today for a follow-up visit with SARMAD Mckinney of [facility] for a trial of the Quantum Stretto PWC.  Sofia had the opportunity to trial the PWC with a right sided joystick controller and demonstrated safety and independence with driving over level indoor and minimally unlevel outdoor surfaces.  She has very specific measurements and specifications for the configuration and access with the PWC and her concerns were carefully considered and addressed with the wheelchair prescription.  Full LMN to follow.

## 2020-03-03 ENCOUNTER — TELEPHONE (OUTPATIENT)
Dept: ADMISSIONS | Facility: HOSPITAL | Age: 45
End: 2020-03-03
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
## 2020-03-12 ENCOUNTER — OFFICE VISIT (OUTPATIENT)
Dept: PSYCHIATRY | Facility: HOSPITAL | Age: 45
End: 2020-03-12
Attending: SOCIAL WORKER
Payer: COMMERCIAL

## 2020-03-12 DIAGNOSIS — F43.21 ADJUSTMENT DISORDER WITH DEPRESSED MOOD: Primary | ICD-10-CM

## 2020-03-12 PROCEDURE — 90791 PSYCH DIAGNOSTIC EVALUATION: CPT | Performed by: SOCIAL WORKER

## 2020-03-12 ASSESSMENT — COGNITIVE AND FUNCTIONAL STATUS - GENERAL
EYE_CONTACT: WNL
PSYCHOMOTOR FUNCTIONING: WNL
SPEECH: REGULAR
PERCEPTUAL FUNCTION: NORMAL
ATTENTION: WNL
THOUGHT_CONTENT: APPROPRIATE
SLEEP_WAKE_CYCLE: NO CHANGE
RECENT MEMORY: WNL
MOOD: ANXIOUS;DEPRESSED
AROUSAL LEVEL: ALERT
CONCENTRATION: WNL
DELUSIONS: NONE OR AGE APPROPRIATE
THOUGHT_PROCESS: WNL
ORIENTATION: FULLY ORIENTED
INSIGHT: INTACT
APPEARANCE: WELL GROOMED
APPETITE: NO CHANGE
AFFECT: FULL RANGE
REMOTE MEMORY: WNL
LIBIDO: NO CHANGE
IMPULSE CONTROL: INTACT
EST. PREMORBID INTELLIGENCE: AVERAGE
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
## 2020-03-17 ENCOUNTER — TELEPHONE (OUTPATIENT)
Dept: PSYCHIATRY | Facility: HOSPITAL | Age: 45
End: 2020-03-17

## 2020-03-17 NOTE — TELEPHONE ENCOUNTER
"Pt was contacted to discuss option of teletherapy. Initially wanting face to face session, but was amenable to trying teletherapy option.  Initial contact discussed session, second call LCSW left message with \"[employer]\" download site.  LCSW will call back with more detailed sign on instructions.  "

## 2020-03-18 ENCOUNTER — TELEPHONE (OUTPATIENT)
Dept: PSYCHIATRY | Facility: HOSPITAL | Age: 45
End: 2020-03-18

## 2020-03-18 NOTE — TELEPHONE ENCOUNTER
Returned Pt call, she reported spouse will begin working from home tomorrow and cannot speak freely if he is in the house.  Pt requested calling when she will be alone in her home and LCSW will then be able to conduct session.  Pt reports being safe in her home at this time. She reported that she did download the [employer] win on her telephone in preparation of teletherapy

## 2020-03-18 NOTE — TELEPHONE ENCOUNTER
LCSW called and left message for pt, to review teletherapy options. Pt has scheduled appointment tomorrow (3/19/20), LCSW will call again at that time or sooner if Pt calls back today.

## 2020-03-18 NOTE — TELEPHONE ENCOUNTER
Pt. called regarding appt. tomorrow at noon. She doesn't think she'll be able to speak privately and would like for you to give her a call to possibly make other arrangements. She stated that she isn't always able to answer her phone, but can be reached via email.

## 2020-03-19 ENCOUNTER — OFFICE VISIT (OUTPATIENT)
Dept: PSYCHIATRY | Facility: HOSPITAL | Age: 45
End: 2020-03-19
Payer: COMMERCIAL

## 2020-03-19 ENCOUNTER — APPOINTMENT (OUTPATIENT)
Dept: PSYCHIATRY | Facility: HOSPITAL | Age: 45
End: 2020-03-19
Attending: SOCIAL WORKER
Payer: COMMERCIAL

## 2020-03-19 DIAGNOSIS — F41.9 ANXIETY DISORDER, UNSPECIFIED TYPE: Primary | ICD-10-CM

## 2020-03-19 DIAGNOSIS — F32.A DEPRESSIVE DISORDER: ICD-10-CM

## 2020-03-19 PROCEDURE — 90837 PSYTX W PT 60 MINUTES: CPT | Mod: 95 | Performed by: SOCIAL WORKER

## 2020-03-19 ASSESSMENT — COGNITIVE AND FUNCTIONAL STATUS - GENERAL
PERCEPTUAL FUNCTION: NORMAL
CONCENTRATION: WNL
RECENT MEMORY: WNL
DELUSIONS: NONE OR AGE APPROPRIATE
AROUSAL LEVEL: ALERT
APPETITE: NO CHANGE
APPEARANCE: WELL GROOMED
THOUGHT_PROCESS: WNL
PSYCHOMOTOR FUNCTIONING: WNL
AFFECT: TEARFUL
SLEEP_WAKE_CYCLE: NO CHANGE
ATTENTION: WNL
SPEECH: REGULAR
REMOTE MEMORY: WNL
EST. PREMORBID INTELLIGENCE: ABOVE AVERAGE
ORIENTATION: FULLY ORIENTED
INSIGHT: INTACT
IMPULSE CONTROL: INTACT
THOUGHT_CONTENT: APPROPRIATE
MOOD: HOPEFUL;DEPRESSED
EYE_CONTACT: WNL

## 2020-03-19 NOTE — PROGRESS NOTES
Sofia Valente is a 45 y.o. female who presents for Follow-up.     Presenting Problem:  Appt took place via telehealth secondary to recent Covid 19 outbreak. Pt informed that she has the right to refuse this teletherapy service if so desired.  Pt consented to telehealth and identified herself by full name and .  REXW/CHEN identified herself as a licensed, practicing psychotherapist in [place], working remotely in a private home office.   Pt informed that connection is secure, from a confidential room and that the session is not being recorded. Pt reports anxious and depressed feelings that she reports have to do with transitions in life and progression of disease (MD).        Mental Status Exam:  Arousal Level: Alert  Appearance: Well Groomed  Speech: Regular  Psychomotor Functioning: WNL  Eye Contact: WNL  Est. Premorbid Intelligence: Above average  Orientation: Fully oriented  Attention: WNL  Concentration: WNL  Recent Memory: WNL  Remote Memory: WNL  Thought Content: Appropriate  Thought Process: WNL  Insight: Intact  Perceptual Function: Normal  Delusions: None or age appropriate  Sleeping: No Change  Appetite: No Change  Affect: Tearful  Mood: Hopeful, Depressed    Screening Assessments done this visit:        [place] Suicide Severity Rating Scale  Not done today       [place]  Depression Screening: Not done today       Assessment:   Pt reports anxious and depressed feelings that she reports have to do with transitions in life, life losses,  including but not limited to 1) loss of full time job she loved (4 yrs ago) due to increased loss of function from progressive disease (MD), 2) decreased social life 3) challenging marriage, 4) efforts to refrain from drinking, which she has 'used as a crutch', does not do often but 'feels the effects significantly' when she does drink. Pt describes large network of family members with same disease as she has, though hers is more progressive and  significant in severity.  LCSW provided supportive listening, and identified strategies to try to ameliorate depressive symptoms.  Pt reports yoga not affective due to physical limitations, and meditation is challenging since her 'mind never turns off'.  Pt interested in meeting with psychiatrist at a future date to discuss medication options.    Psychological condition is generally: worsening  Plan: Patient to F/U with Weekly psychotherapy for 45 minutes each session.    Visit Diagnosis:  1. Anxiety disorder, unspecified type    2. Depressive disorder       Sofia Valente is a 45 y.o. female who presents for Follow-up.     Presenting Problem:  See above      Mental Status Exam:  Arousal Level: Alert  Appearance: Well Groomed  Speech: Regular  Psychomotor Functioning: WNL  Eye Contact: WNL  Est. Premorbid Intelligence: Above average  Orientation: Fully oriented  Attention: WNL  Concentration: WNL  Recent Memory: WNL  Remote Memory: WNL  Thought Content: Appropriate  Thought Process: WNL  Insight: Intact  Perceptual Function: Normal  Delusions: None or age appropriate  Sleeping: No Change  Appetite: No Change  Affect: Tearful  Mood: Hopeful, Depressed    Screening Assessments done this visit:        [place] Suicide Severity Rating Scale  Done today       [place]  Depression Screening: Done today       Assessment:   See above  Psychological condition is generally: showing no change  Plan: Patient to F/U with Weekly psychotherapy for 45 minutes each session.    Visit Diagnosis:  1. Anxiety disorder, unspecified type    2. Depressive disorder         Time  Start Time: 100  End Time: 200  Total Time: 60  Dorene England LCSW @ 2:51 PM         Time  Start Time: 100  End Time: 0  Total Time: 60  Dorene England LCSW @ 2:50 PM

## 2020-04-06 ENCOUNTER — TELEPHONE (OUTPATIENT)
Dept: FAMILY MEDICINE | Facility: CLINIC | Age: 45
End: 2020-04-06

## 2020-04-06 RX ORDER — NAPROXEN 375 MG/1
375 TABLET ORAL 2 TIMES DAILY WITH MEALS
Qty: 180 TABLET | Refills: 1 | Status: SHIPPED | OUTPATIENT
Start: 2020-04-06 | End: 2020-11-17 | Stop reason: ALTCHOICE

## 2020-04-06 NOTE — TELEPHONE ENCOUNTER
Patient has question about prescription that needs to be 90 days instead of 30. Also patient has question about letter of medical necessity. Patient was seen early march in Dr. Choudhury's office. Patient can be reched at [phone]

## 2020-04-08 ENCOUNTER — TELEMEDICINE (OUTPATIENT)
Dept: PSYCHIATRY | Facility: HOSPITAL | Age: 45
End: 2020-04-08
Attending: SOCIAL WORKER
Payer: COMMERCIAL

## 2020-04-08 DIAGNOSIS — F32.A DEPRESSIVE DISORDER: ICD-10-CM

## 2020-04-08 DIAGNOSIS — F41.1 GENERALIZED ANXIETY DISORDER: Primary | ICD-10-CM

## 2020-04-08 PROCEDURE — 90837 PSYTX W PT 60 MINUTES: CPT | Mod: 95 | Performed by: SOCIAL WORKER

## 2020-04-08 ASSESSMENT — COGNITIVE AND FUNCTIONAL STATUS - GENERAL
PERCEPTUAL FUNCTION: NORMAL
SLEEP_WAKE_CYCLE: NO CHANGE
AFFECT: OTHER:
REMOTE MEMORY: WNL
LIBIDO: NO CHANGE
EYE_CONTACT: WNL
ATTENTION: WNL
DELUSIONS: NONE OR AGE APPROPRIATE
CONCENTRATION: WNL
AROUSAL LEVEL: ALERT
SPEECH: REGULAR
ORIENTATION: FULLY ORIENTED
IMPULSE CONTROL: INTACT
THOUGHT_PROCESS: WNL
PSYCHOMOTOR FUNCTIONING: WNL
APPETITE: NO CHANGE
APPEARANCE: WELL GROOMED
THOUGHT_CONTENT: APPROPRIATE
MOOD: ANXIOUS;FRUSTRATED
RECENT MEMORY: WNL

## 2020-04-08 NOTE — PROGRESS NOTES
Request for Consent  Patient provided verbal consent to treat via telemedicine using [employer]. Clinician introduced [employer] as a secure, telemedicine platform that we are utilizing to provide care during the COVID-19 pandemic. Patient understands the session will be billed to their insurance or patient directly.  Patient was informed only the patient and the clinician are permitted on?the video conference, sessions are not recorded by the clinician, and the patient is not permitted to record the session.? Patient was provided clinician's unique meeting ID prior to session, patient was asked to arrive to virtual session on time just as patient would if we were in the office. Clinician confirmed identification of patient by name and birthdate, provider name, location of patient and clinician, and callback number in case disconnected.  Patient Response to Request for Consent: Yes  Visit Type performed: Audio and Video     Sofia Valente is a 45 y.o. female who presents for Follow-up.     Presenting Problem:  Anxiety, intermittent worry, frustration.      Mental Status Exam:  Arousal Level: Alert  Appearance: Well Groomed  Speech: Regular  Psychomotor Functioning: WNL  Eye Contact: WNL  Orientation: Fully oriented  Attention: WNL  Concentration: WNL  Recent Memory: WNL  Remote Memory: WNL  Thought Content: Appropriate  Thought Process: WNL  Perceptual Function: Normal  Delusions: None or age appropriate  Sleeping: No Change  Appetite: No Change  Libido: No change  Affect: Other:  Mood: Anxious, Frustrated    Screening Assessments done this visit:        [place] Suicide Severity Rating Scale  Not done today       [place]  Depression Screening: Not done today       Assessment:     Pt described her daily activities, efforts to keep busy and realization of a decrease in motivation and sense of purpose since leaving her job 4-5 years ago.  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 She continues to worry about future decrease of mobility and independence, but does not dwell on this.  Sturcture and motivation remain intermittent, which is frustrating.  Marital challenges remain present, and pt is very dependent on her , which causes some worry for her.  Pt is going to visit parents for several weeks [place] [facility], but will continue teletherapy while visiting with parents for  duration of trip, which she anticipates will be for at least until self isolation ban due to COVID 19 is lifted or relaxed in the area.     Psychological condition is generally: showing no change  Plan: Patient to F/U with Biweekly psychotherapy for 45 minutes each session.    Visit Diagnosis:  1. Generalized anxiety disorder    2. Depressive disorder         Time  Start Time: 0930  End Time: 1030  Total Time: 60  Dorene England LCSW @ 12:52 PM

## 2020-04-16 ENCOUNTER — TELEPHONE (OUTPATIENT)
Dept: FAMILY MEDICINE | Facility: CLINIC | Age: 45
End: 2020-04-16

## 2020-04-16 NOTE — TELEPHONE ENCOUNTER
Pt called regarding forms to be filled out  Which forms are here and ready to be signed. Pt has asked when will forms be signed? Pt can be reached at

## 2020-05-01 ENCOUNTER — TELEMEDICINE (OUTPATIENT)
Dept: FAMILY MEDICINE | Facility: CLINIC | Age: 45
End: 2020-05-01
Payer: COMMERCIAL

## 2020-05-01 DIAGNOSIS — G89.29 CHRONIC RIGHT SHOULDER PAIN: Primary | ICD-10-CM

## 2020-05-01 DIAGNOSIS — M25.511 CHRONIC RIGHT SHOULDER PAIN: Primary | ICD-10-CM

## 2020-05-01 PROBLEM — G71.00 MUSCULAR DYSTROPHY (CMS/HCC): Status: ACTIVE | Noted: 2020-05-01

## 2020-05-01 PROCEDURE — 99441 PR PHYS/QHP TELEPHONE EVALUATION 5-10 MIN: CPT | Performed by: INTERNAL MEDICINE

## 2020-05-01 RX ORDER — TRAMADOL HYDROCHLORIDE 50 MG/1
50 TABLET ORAL EVERY 8 HOURS PRN
Qty: 60 TABLET | Refills: 0 | Status: SHIPPED | OUTPATIENT
Start: 2020-05-01 | End: 2020-07-16 | Stop reason: SDUPTHER

## 2020-05-01 NOTE — PROGRESS NOTES
Verification of Patient Location:  The patient affirms they are currently located in the following state:[place]     Request for Consent:  You and I are about to have a telemedicine check-in or visit. This is allowed because you are already my patient, and you have requested it.  This telemedicine visit will be billed to your health insurance or you, if you are self-insured.  You understand you will be responsible for any copayments or coinsurances that apply to your telemedicine visit.  Before starting our telemedicine visit, I am required to get your consent for this virtual check-in or visit by telemedicine. Do you consent?      Patient Response to Request for Consent: Yes    The following have been reviewed and updated as appropriate in this visit:         Visit Documentation:    46 y/o female presents for telemedicine visit. Shoulder tendonitis   May start toradol   Tramadol prn   Time spent 10 minutes  A&P  1. Shoulder pain - ok to take toradol - take PPI and with food and water       Time Spent in Medical Discussion During This Encounter:  10 minutes

## 2020-05-12 RX ORDER — ZOLPIDEM TARTRATE 5 MG/1
5 TABLET ORAL NIGHTLY
Qty: 30 TABLET | Refills: 0 | Status: SHIPPED | OUTPATIENT
Start: 2020-05-12 | End: 2020-09-03 | Stop reason: SDUPTHER

## 2020-05-12 RX ORDER — ZOLPIDEM TARTRATE 5 MG/1
1 TABLET ORAL NIGHTLY
COMMUNITY
Start: 2020-03-09 | End: 2020-05-12 | Stop reason: SDUPTHER

## 2020-05-14 ENCOUNTER — TELEMEDICINE (OUTPATIENT)
Dept: PSYCHIATRY | Facility: HOSPITAL | Age: 45
End: 2020-05-14
Attending: SOCIAL WORKER
Payer: COMMERCIAL

## 2020-05-14 DIAGNOSIS — F32.A DEPRESSIVE DISORDER: Primary | ICD-10-CM

## 2020-05-14 PROCEDURE — 90837 PSYTX W PT 60 MINUTES: CPT | Mod: 95 | Performed by: SOCIAL WORKER

## 2020-05-14 ASSESSMENT — COGNITIVE AND FUNCTIONAL STATUS - GENERAL
PERCEPTUAL FUNCTION: NORMAL
INSIGHT: INTACT
EST. PREMORBID INTELLIGENCE: AVERAGE
THOUGHT_PROCESS: WNL
REMOTE MEMORY: WNL
SLEEP_WAKE_CYCLE: NO CHANGE
THOUGHT_CONTENT: APPROPRIATE
APPETITE: NO CHANGE
DELUSIONS: NONE OR AGE APPROPRIATE
AFFECT: FULL RANGE
PSYCHOMOTOR FUNCTIONING: WNL
ATTENTION: WNL
APPEARANCE: WELL GROOMED
MOOD: EUTHYMIC (NORMAL);FRUSTRATED
ORIENTATION: FULLY ORIENTED
AROUSAL LEVEL: ALERT
EYE_CONTACT: WNL
CONCENTRATION: WNL
RECENT MEMORY: WNL
IMPULSE CONTROL: INTACT
SPEECH: REGULAR

## 2020-05-14 NOTE — PROGRESS NOTES
Request for Consent  Patient provided verbal consent to treat via telemedicine using [employer]. Clinician introduced [employer] as a secure, telemedicine platform that we are utilizing to provide care during the COVID-19 pandemic. Patient understands the session will be billed to their insurance or patient directly.  Patient was informed only the patient and the clinician are permitted on?the video conference, sessions are not recorded by the clinician, and the patient is not permitted to record the session.? Patient was provided clinician's unique meeting ID prior to session, patient was asked to arrive to virtual session on time just as patient would if we were in the office. Clinician confirmed identification of patient by name and birthdate, provider name, location of patient and clinician, and callback number in case disconnected.  Patient Response to Request for Consent: Yes  Visit Type performed: Audio and Video Sofia Valente is a 45 y.o. female who presents for Follow-up.     Presenting Problem:  Depression, anxiety      Mental Status Exam:  Arousal Level: Alert  Appearance: Well Groomed  Speech: Regular  Psychomotor Functioning: WNL  Eye Contact: WNL  Est. Premorbid Intelligence: Average  Orientation: Fully oriented  Attention: WNL  Concentration: WNL  Recent Memory: WNL  Remote Memory: WNL  Thought Content: Appropriate  Thought Process: WNL  Insight: Intact  Perceptual Function: Normal  Delusions: None or age appropriate  Sleeping: No Change  Appetite: No Change  Affect: Full Range  Mood: Euthymic (normal), Frustrated    Screening Assessments done this visit:        [place] Suicide Severity Rating Scale  Not done today       [place]  Depression Screening: Not done today       Assessment:   Pt signed in from parents home where she has been for the past few weeks, and is planning on returning home to  in a week.  She is sad at the prospect of returning home due to conflicts with  this relationship. She reports more frequent pain syndrome in neck and shoulder, which has not responded to medication, and adds to her worry and upset feelings. REXW and pt discussed pain syndrome and impact of chronic pain on emotions.  Pt continues to try to gain clarity as to what her future should be, given her physical restrictions and limitations.  She identifies loneliness' as a significant factor in making a decision.  Pt would like to explore history of marriage and look at impact of this relationship on her current decision making process.  Pt reflected on her own family of origin, how blessed she is for the nurturing love and support, but is well aware of her family's limitations as well (most of then also dx with MD).  Patient saddened but could identify looking forward to getting her new wheelchair and her redecorating her office upon returning home.  Family will help with part of the drive home, since the trip is exhausting for her physically. Pt reminded that there is no rush to make decisions right now, nor does she need to pressure herself. She continues to engage in her volunteer activities with [facility] as .    Psychological condition is generally: worsening  Plan: Patient to F/U with Weekly psychotherapy for 45 minutes each session.    Visit Diagnosis:  1. Depressive disorder         Time  Start Time: 1200  End Time: 1300  Total Time: 60  Dorene England LCSW @ 1:11 PM

## 2020-05-22 ENCOUNTER — TELEMEDICINE (OUTPATIENT)
Dept: PSYCHIATRY | Facility: HOSPITAL | Age: 45
End: 2020-05-22
Attending: SOCIAL WORKER
Payer: COMMERCIAL

## 2020-05-22 DIAGNOSIS — F43.21 ADJUSTMENT DISORDER WITH DEPRESSED MOOD: Primary | ICD-10-CM

## 2020-05-22 PROCEDURE — 90837 PSYTX W PT 60 MINUTES: CPT | Mod: 95 | Performed by: SOCIAL WORKER

## 2020-05-22 ASSESSMENT — COGNITIVE AND FUNCTIONAL STATUS - GENERAL
THOUGHT_CONTENT: APPROPRIATE
ATTENTION: WNL
RECENT MEMORY: WNL
MOOD: EUTHYMIC (NORMAL);FRUSTRATED;DEPRESSED
IMPULSE CONTROL: INTACT
EYE_CONTACT: WNL
CONCENTRATION: WNL
PSYCHOMOTOR FUNCTIONING: WNL
THOUGHT_PROCESS: WNL
SLEEP_WAKE_CYCLE: DECREASED
AROUSAL LEVEL: ALERT
EST. PREMORBID INTELLIGENCE: AVERAGE
APPEARANCE: WELL GROOMED
REMOTE MEMORY: WNL
APPETITE: NO CHANGE
ORIENTATION: FULLY ORIENTED
PERCEPTUAL FUNCTION: NORMAL
AFFECT: FULL RANGE
DELUSIONS: NONE OR AGE APPROPRIATE
SPEECH: REGULAR
INSIGHT: INTACT

## 2020-05-28 ENCOUNTER — HOSPITAL ENCOUNTER (OUTPATIENT)
Dept: PHYSICAL THERAPY | Facility: REHABILITATION | Age: 45
Setting detail: THERAPIES SERIES
Discharge: HOME | End: 2020-05-28
Attending: PHYSICAL MEDICINE & REHABILITATION
Payer: COMMERCIAL

## 2020-05-28 DIAGNOSIS — G71.02 FACIOSCAPULOHUMERAL MUSCULAR DYSTROPHY (CMS/HCC): Primary | ICD-10-CM

## 2020-05-28 PROCEDURE — 97530 THERAPEUTIC ACTIVITIES: CPT | Mod: GP

## 2020-05-28 PROCEDURE — 97542 WHEELCHAIR MNGMENT TRAINING: CPT | Mod: GP,59

## 2020-05-28 NOTE — LETTER
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     By signing this plan of care, I certify this plan of care as correct and necessary for the patient.        Physician Signature: _________________________________________ Date: _________________    Thank you for this referral. Please contact our department with any questions.      April Thomas PT      No notes on file

## 2020-05-28 NOTE — Clinical Note
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     By signing this plan of care, I certify this plan of care as correct and necessary for the patient.        Physician Signature: _________________________________________ Date: _________________    Thank you for this referral. Please contact our department with any questions.      April Thomas PT    No notes on file

## 2020-05-28 NOTE — OP PT TREATMENT LOG
DELIVERY NOTE for ASSISTIVE TECHNOLOGY    Long Term Goals Time Frame Result Comment/Progress   Independent mobility in new Quantum Stretto power wheelchair  24 weeks MET      Good postural support in new seating system 24 weeks Ongoing Further adjustments required   Good skin pressure management in new seating system 24 weeks MET    Independent transfers to/from bed/toilet/car seat from new Stretto PWC 8 weeks NEW        Serial number of new wheelchair: To be provided at final delivery appt    Intervention and results:Sofia Valente is a 45 y.o. female who was seen today for fitting and delivery of the new Quantum Stretto powerwheelchair, delivered as ordered by SARMAD Mckinney of [facility] .  Sofia Valente was dependently transferred into the new wheelchair and adjustments were made to optimize posture and alignment.  Adjustments included height and position of the footplates, headrest position, and armrest position and joystick.  Following adjustments, Sofia drove the PWC independently with the right sided joystick control.  She was able to perform her modified transfer onto/off of a standard toilet with use of the grab bar to assist and to/from her vehicle seat in her accessible van.  Due to her FSH MD, Sofia requires very specific positioning and configuration of her PWC.  The current swing away joystick bracket requires too much force to move out of the way so this will need to be replaced with a different one.  Additionally, the joystick needs to be positioned closer to the armpad as she utilizes this as a hand hold to anchor her hand during performance of transfers.  She will benefit from the addition of a strap to assist with elevating the footplate as she has significant weakness lifting the plates when flexed forward at her trunk.  The armrests will need to be adjusted further to allow her to lean laterally to access things close to the floor as this is a skill necessary for her  to be independent in her daily life.  Elbow stops need to be added to the chair to prevent her arms from falling posteriorly in full tilt positioning.  These modifications will be completed and 1-3 follow-up visits will be utilized to complete the necessary adjustments and modifications to ensure the proper PWC configuration to promote independence with transfers to/from her new PWC.      Education provided in these areas:  Weight shifting  Care of new wheelchair   Skin pressure management  How to get service for mobility device  Use of transit tie-downs  Charging batteries            Plan: 1-3 follow-up visits will be required to complete the necessary adjustments and modifications to ensure the proper PWC configuration to promote independence with transfers to/from her new PWC to meet all established LTGs.

## 2020-05-28 NOTE — LETTER
[address] [address]  [phone]  [employer] Fax: [phone]    PHYSICAL THERAPY PLAN OF CARE    Patient Name: Sofia Valente    Certification Dates:  From 20  To: 20  Frequency: Other Duration: 6 months  Other:      Provider: April Thomas PT     Referring Provider: Pepe Choudhury DO  PCP: Pepe Choudhury DO        Payor: Payor: [facility] / Plan: [employer] [place] [employer] [facility] PPO/HMO / Product Type: Managed Care /   Medical Diagnosis: Facioscapulohumeral muscular dystrophy (CMS/HCC) [G71.02]     By signing this plan of care, I certify this plan of care as correct and necessary for the patient.        Physician Signature: _________________________________________ Date: _________________    Thank you for this referral. Please contact our department with any questions.      April Thomas, PT      PT DAILY NOTE FOR OUTPATIENT THERAPY    Patient: Sofia Valente   MRN: [id number]  : 1975 45 y.o.  Referring Physician: Pepe Choudhury DO  Date of Visit: 2020    Certification Dates: 20 through 20    Diagnosis:   1. Facioscapulohumeral muscular dystrophy (CMS/HCC)          DELIVERY NOTE for ASSISTIVE TECHNOLOGY    Long Term Goals Time Frame Result Comment/Progress   Independent mobility in new Quantum Stretto power wheelchair  24 weeks MET      Good postural support in new seating system 24 weeks Ongoing Further adjustments required   Good skin pressure management in new seating system 24 weeks MET    Independent transfers to/from bed/toilet/car seat from new Stretto PWC 8 weeks NEW        Serial number of new wheelchair: To be provided at final delivery appt    Intervention and results:Sofia Valente is a 45 y.o. female who was seen today for fitting and delivery of the new Quantum Stretto powerwheelchair, delivered as ordered by SARMAD Mckinney of [email or web address] .  Sofia Valente was dependently transferred into the new  wheelchair and adjustments were made to optimize posture and alignment.  Adjustments included height and position of the footplates, headrest position, and armrest position and joystick.  Following adjustments, Sofia drove the PWC independently with the right sided joystick control.  She was able to perform her modified transfer onto/off of a standard toilet with use of the grab bar to assist and to/from her vehicle seat in her accessible van.  Due to her FSH MD, Sofia requires very specific positioning and configuration of her PWC.  The current swing away joystick bracket requires too much force to move out of the way so this will need to be replaced with a different one.  Additionally, the joystick needs to be positioned closer to the armpad as she utilizes this as a hand hold to anchor her hand during performance of transfers.  She will benefit from the addition of a strap to assist with elevating the footplate as she has significant weakness lifting the plates when flexed forward at her trunk.  The armrests will need to be adjusted further to allow her to lean laterally to access things close to the floor as this is a skill necessary for her to be independent in her daily life.  Elbow stops need to be added to the chair to prevent her arms from falling posteriorly in full tilt positioning.  These modifications will be completed and 1-3 follow-up visits will be utilized to complete the necessary adjustments and modifications to ensure the proper PWC configuration to promote independence with transfers to/from her new PWC.      Education provided in these areas:  Weight shifting  Care of new wheelchair   Skin pressure management  How to get service for mobility device  Use of transit tie-downs  Charging batteries            Plan: 1-3 follow-up visits will be required to complete the necessary adjustments and modifications to ensure the proper PWC configuration to promote independence with transfers to/from her  new PWC to meet all established LTGs.

## 2020-05-28 NOTE — PROGRESS NOTES
PT DAILY NOTE FOR OUTPATIENT THERAPY    Patient: Sofia Valente   MRN: [id number]  : 1975 45 y.o.  Referring Physician: Pepe Choudhury DO  Date of Visit: 2020    Certification Dates: 20 through 20    Diagnosis:   1. Facioscapulohumeral muscular dystrophy (CMS/HCC)          DELIVERY NOTE for ASSISTIVE TECHNOLOGY    Long Term Goals Time Frame Result Comment/Progress   Independent mobility in new Quantum Stretto power wheelchair  24 weeks MET      Good postural support in new seating system 24 weeks Ongoing Further adjustments required   Good skin pressure management in new seating system 24 weeks MET    Independent transfers to/from bed/toilet/car seat from new Stretto PWC 8 weeks NEW        Serial number of new wheelchair: To be provided at final delivery appt    Intervention and results:Sofia Valente is a 45 y.o. female who was seen today for fitting and delivery of the new Quantum Stretto powerwheelchair, delivered as ordered by SARMAD Mckinney of [facility] .  Sofia Valente was dependently transferred into the new wheelchair and adjustments were made to optimize posture and alignment.  Adjustments included height and position of the footplates, headrest position, and armrest position and joystick.  Following adjustments, Sofia drove the PWC independently with the right sided joystick control.  She was able to perform her modified transfer onto/off of a standard toilet with use of the grab bar to assist and to/from her vehicle seat in her accessible van.  Due to her FSH MD, Sofia requires very specific positioning and configuration of her PWC.  The current swing away joystick bracket requires too much force to move out of the way so this will need to be replaced with a different one.  Additionally, the joystick needs to be positioned closer to the armpad as she utilizes this as a hand hold to anchor her hand during performance of transfers.  She will  benefit from the addition of a strap to assist with elevating the footplate as she has significant weakness lifting the plates when flexed forward at her trunk.  The armrests will need to be adjusted further to allow her to lean laterally to access things close to the floor as this is a skill necessary for her to be independent in her daily life.  Elbow stops need to be added to the chair to prevent her arms from falling posteriorly in full tilt positioning.  These modifications will be completed and 1-3 follow-up visits will be utilized to complete the necessary adjustments and modifications to ensure the proper PWC configuration to promote independence with transfers to/from her new PWC.      Education provided in these areas:  Weight shifting  Care of new wheelchair   Skin pressure management  How to get service for mobility device  Use of transit tie-downs  Charging batteries            Plan: 1-3 follow-up visits will be required to complete the necessary adjustments and modifications to ensure the proper PWC configuration to promote independence with transfers to/from her new PWC to meet all established LTGs.

## 2020-05-29 NOTE — ADDENDUM NOTE
Encounter addended by: April Thomas, PT on: 5/29/2020 10:25 AM   Actions taken: Charge Capture section accepted

## 2020-06-02 ENCOUNTER — TELEMEDICINE (OUTPATIENT)
Dept: PSYCHIATRY | Facility: HOSPITAL | Age: 45
End: 2020-06-02
Attending: SOCIAL WORKER
Payer: COMMERCIAL

## 2020-06-02 DIAGNOSIS — F43.21 ADJUSTMENT DISORDER WITH DEPRESSED MOOD: Primary | ICD-10-CM

## 2020-06-02 PROCEDURE — 90837 PSYTX W PT 60 MINUTES: CPT | Mod: 95 | Performed by: SOCIAL WORKER

## 2020-06-03 ASSESSMENT — COGNITIVE AND FUNCTIONAL STATUS - GENERAL
ATTENTION: WNL
APPEARANCE: WELL GROOMED
PSYCHOMOTOR FUNCTIONING: WNL
REMOTE MEMORY: WNL
CONCENTRATION: WNL
INSIGHT: INTACT
IMPULSE CONTROL: INTACT
LIBIDO: NO CHANGE
AROUSAL LEVEL: ALERT
SLEEP_WAKE_CYCLE: NO CHANGE
SPEECH: REGULAR
EYE_CONTACT: WNL
AFFECT: FULL RANGE
THOUGHT_CONTENT: APPROPRIATE
EST. PREMORBID INTELLIGENCE: AVERAGE
APPETITE: NO CHANGE
RECENT MEMORY: WNL
ORIENTATION: FULLY ORIENTED
THOUGHT_PROCESS: WNL
DELUSIONS: NONE OR AGE APPROPRIATE
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
## 2020-06-10 ENCOUNTER — TELEMEDICINE (OUTPATIENT)
Dept: PSYCHIATRY | Facility: HOSPITAL | Age: 45
End: 2020-06-10
Attending: SOCIAL WORKER
Payer: COMMERCIAL

## 2020-06-10 DIAGNOSIS — F43.21 ADJUSTMENT DISORDER WITH DEPRESSED MOOD: Primary | ICD-10-CM

## 2020-06-10 PROCEDURE — 90837 PSYTX W PT 60 MINUTES: CPT | Mod: 95 | Performed by: SOCIAL WORKER

## 2020-06-10 ASSESSMENT — COGNITIVE AND FUNCTIONAL STATUS - GENERAL
REMOTE MEMORY: WNL
INSIGHT: INTACT
THOUGHT_PROCESS: WNL
APPETITE: NO CHANGE
IMPULSE CONTROL: INTACT
PSYCHOMOTOR FUNCTIONING: WNL
AFFECT: FULL RANGE
SLEEP_WAKE_CYCLE: NO CHANGE
SPEECH: REGULAR
PERCEPTUAL FUNCTION: NORMAL
ORIENTATION: FULLY ORIENTED
CONCENTRATION: WNL
THOUGHT_CONTENT: APPROPRIATE
ATTENTION: WNL
EYE_CONTACT: WNL
RECENT MEMORY: WNL
APPEARANCE: WELL GROOMED
EST. PREMORBID INTELLIGENCE: AVERAGE
MOOD: EUTHYMIC (NORMAL);HELPLESS
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
## 2020-06-16 ENCOUNTER — TELEMEDICINE (OUTPATIENT)
Dept: PSYCHIATRY | Facility: HOSPITAL | Age: 45
End: 2020-06-16
Attending: SOCIAL WORKER
Payer: COMMERCIAL

## 2020-06-16 DIAGNOSIS — F43.21 ADJUSTMENT DISORDER WITH DEPRESSED MOOD: Primary | ICD-10-CM

## 2020-06-16 PROCEDURE — 90837 PSYTX W PT 60 MINUTES: CPT | Mod: 95 | Performed by: SOCIAL WORKER

## 2020-06-16 ASSESSMENT — COGNITIVE AND FUNCTIONAL STATUS - GENERAL
APPEARANCE: WELL GROOMED
SLEEP_WAKE_CYCLE: NO CHANGE
PERCEPTUAL FUNCTION: NORMAL
AFFECT: FULL RANGE
ATTENTION: WNL
DELUSIONS: NONE OR AGE APPROPRIATE
REMOTE MEMORY: WNL
CONCENTRATION: WNL
INSIGHT: INTACT
MOOD: EUTHYMIC (NORMAL);ANXIOUS
PSYCHOMOTOR FUNCTIONING: WNL
AROUSAL LEVEL: ALERT
RECENT MEMORY: WNL
APPETITE: NO CHANGE
THOUGHT_PROCESS: WNL
EST. PREMORBID INTELLIGENCE: AVERAGE
SPEECH: REGULAR
THOUGHT_CONTENT: APPROPRIATE
EYE_CONTACT: WNL
IMPULSE CONTROL: INTACT
ORIENTATION: FULLY ORIENTED

## 2020-06-16 NOTE — PROGRESS NOTES
Request for Consent  Patient provided verbal consent to treat via telemedicine. Clinician introduced the secure telemedicine platform that we are utilizing to provide care during the COVID-19 pandemic. Patient understands the session will be billed to their insurance or patient directly.  Patient was informed only the patient and the clinician are permitted on?the video conference, sessions are not recorded by the clinician, and the patient is not permitted to record the session.? Patient was provided clinician's unique meeting ID prior to session, patient was asked to arrive to virtual session on time just as patient would if we were in the office. Clinician confirmed identification of patient by name and birthdate, provider name, location of patient and clinician, and callback number in case disconnected.  Patient Response to Request for Consent: Yes  Visit Type performed: Audio and Video Sofia Valente is a 45 y.o. female who presents for Follow-up.     Presenting Problem:  Anxiety, increased pain and difficulty with longstanding medical issues      Mental Status Exam:  Arousal Level: Alert  Appearance: Well Groomed  Speech: Regular  Psychomotor Functioning: WNL  Eye Contact: WNL  Est. Premorbid Intelligence: Average  Orientation: Fully oriented  Attention: WNL  Concentration: WNL  Recent Memory: WNL  Remote Memory: WNL  Thought Content: Appropriate  Thought Process: WNL  Insight: Intact  Perceptual Function: Normal  Delusions: None or age appropriate  Sleeping: No Change  Appetite: No Change  Affect: Full Range  Mood: Euthymic (normal), Anxious     [place] Suicide Severity Rating Scale  Not done today       Assessment:   Pt reported 'I have not been feeling well, my crohn's, celiac and shoulder pain, I don't know if my PMS part of that'.    Pt reports ongoing chronic, extreme pain in shoulder where she had scapula fusion and implantation of bone stimulator years ago, needing cortozone shot in other  side (elbow).  In two weeks will go to [place] to see orthopediac surgeon to determine next steps, possibly having wires and stimulator removed.  She reports exceleration of muscle loss.  Physical activity significantly decreased, feeling upset and worried about future progression of her MD.    Pt reported that she 'has been using the tools you gave me' to cope with conflicts with , and they have been effective. She has been socializing, friends came to her home for visit, PCA (personal care [facility]) [facility] which was a very positive trip out.  Last week watched a seminar about having accessible car, will call company to find out more: concerns about price, but does not want to loose mobility or freedom.  REXW praised pt for her tenacity and substantiated her need to slow down.  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  Pt asked about billing, message sent to office for response.  She keeps 'close track of medical bills to meet deductible, which will impact her upcoming purchase of a new, customized wheel chair.  FRANNY inquired at [facility] office for direction on this.  Pt had better week with , and together they were cat sitting, which was a welcome addition to their home, which turned out to be a ishan they shared together. Pt reports she is losing weight and does not know why, but will discuss with her physician in two weeks.      Psychological condition is generally: improving  Plan: Patient to F/U with Weekly psychotherapy for 45 minutes each session.    Visit Diagnosis:  1. Adjustment disorder with depressed mood         Time  Start Time: 1200  End Time: 1300  Total Time: 60  Dorene England LCSW @ 1:15 PM

## 2020-06-23 ENCOUNTER — APPOINTMENT (OUTPATIENT)
Dept: PSYCHIATRY | Facility: HOSPITAL | Age: 45
End: 2020-06-23
Attending: SOCIAL WORKER
Payer: COMMERCIAL

## 2020-07-02 ENCOUNTER — TELEMEDICINE (OUTPATIENT)
Dept: PSYCHIATRY | Facility: HOSPITAL | Age: 45
End: 2020-07-02
Attending: SOCIAL WORKER
Payer: COMMERCIAL

## 2020-07-02 DIAGNOSIS — F43.21 ADJUSTMENT DISORDER WITH DEPRESSED MOOD: Primary | ICD-10-CM

## 2020-07-02 PROCEDURE — 90837 PSYTX W PT 60 MINUTES: CPT | Mod: 95 | Performed by: SOCIAL WORKER

## 2020-07-02 ASSESSMENT — COGNITIVE AND FUNCTIONAL STATUS - GENERAL
DELUSIONS: NONE OR AGE APPROPRIATE
APPEARANCE: WELL GROOMED
SPEECH: REGULAR
APPETITE: NO CHANGE
MOOD: EUTHYMIC (NORMAL);HOPEFUL;MOTIVATED
PSYCHOMOTOR FUNCTIONING: WNL
AROUSAL LEVEL: ALERT
CONCENTRATION: WNL
REMOTE MEMORY: WNL
THOUGHT_CONTENT: APPROPRIATE
EYE_CONTACT: WNL
INSIGHT: INTACT
SLEEP_WAKE_CYCLE: NO CHANGE
EST. PREMORBID INTELLIGENCE: AVERAGE
AFFECT: FULL RANGE
ORIENTATION: FULLY ORIENTED
THOUGHT_PROCESS: WNL
RECENT MEMORY: WNL
PERCEPTUAL FUNCTION: NORMAL
IMPULSE CONTROL: INTACT
ATTENTION: WNL
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
## 2020-07-07 ENCOUNTER — TELEMEDICINE (OUTPATIENT)
Dept: PSYCHIATRY | Facility: HOSPITAL | Age: 45
End: 2020-07-07
Attending: SOCIAL WORKER
Payer: COMMERCIAL

## 2020-07-07 DIAGNOSIS — F43.21 ADJUSTMENT DISORDER WITH DEPRESSED MOOD: Primary | ICD-10-CM

## 2020-07-07 PROCEDURE — 90837 PSYTX W PT 60 MINUTES: CPT | Mod: 95 | Performed by: SOCIAL WORKER

## 2020-07-07 ASSESSMENT — COGNITIVE AND FUNCTIONAL STATUS - GENERAL
AFFECT: FULL RANGE
APPETITE: NO CHANGE
LIBIDO: NO CHANGE
SLEEP_WAKE_CYCLE: NO CHANGE
PERCEPTUAL FUNCTION: NORMAL
PSYCHOMOTOR FUNCTIONING: WNL
SPEECH: REGULAR
REMOTE MEMORY: WNL
THOUGHT_CONTENT: APPROPRIATE
AROUSAL LEVEL: ALERT
APPEARANCE: WELL GROOMED
THOUGHT_PROCESS: WNL
ORIENTATION: FULLY ORIENTED
INSIGHT: INTACT
CONCENTRATION: WNL
EYE_CONTACT: WNL
MOOD: EUTHYMIC (NORMAL);HOPEFUL;ANXIOUS
RECENT MEMORY: WNL
IMPULSE CONTROL: INTACT
ATTENTION: WNL
EST. PREMORBID INTELLIGENCE: AVERAGE
DELUSIONS: NONE OR AGE APPROPRIATE

## 2020-07-07 NOTE — PROGRESS NOTES
Request for Consent  Patient provided verbal consent to treat via telemedicine. Clinician introduced the secure telemedicine platform that we are utilizing to provide care during the COVID-19 pandemic. Patient understands the session will be billed to their insurance or patient directly.  Patient was informed only the patient and the clinician are permitted on?the video conference, sessions are not recorded by the clinician, and the patient is not permitted to record the session.? Patient was provided clinician's unique meeting ID prior to session, patient was asked to arrive to virtual session on time just as patient would if we were in the office. Clinician confirmed identification of patient by name and birthdate, provider name, location of patient and clinician, and callback number in case disconnected.  Patient Response to Request for Consent: Yes  Visit Type performed: Audio and Video Sofia Valente is a 45 y.o. female who presents for Follow-up.     Presenting Problem:  Anxiety and depression      Mental Status Exam:  Arousal Level: Alert  Appearance: Well Groomed  Speech: Regular  Psychomotor Functioning: WNL  Eye Contact: WNL  Est. Premorbid Intelligence: Average  Orientation: Fully oriented  Attention: WNL  Concentration: WNL  Recent Memory: WNL  Remote Memory: WNL  Thought Content: Appropriate  Thought Process: WNL  Insight: Intact  Perceptual Function: Normal  Delusions: None or age appropriate  Sleeping: No Change  Appetite: No Change  Libido: No change  Affect: Full Range  Mood: Euthymic (normal), Hopeful, Anxious     [place] Suicide Severity Rating Scale  Not done today       Assessment:   Pt reports 'I have to tell you, I'm feeling so much better' (referring to pain relief after receiving Cortizone shot in elbow).    Pt oriented x4, positive affect, congruent mood.  Good  Eye contact, appropriate dress, good insight, judgement and goal directed.      Pt is getting ready for COVID19 test on  7/24 in preperation for her surgery on 7/28 to remove bone stimulator and remove some wired that are attatched to ribs.  She has made all arrangements, rides, and except for the inconvenience of not being able to be accompanied into the hospital by anyone, her remaining concern is what will happen to her $[id number] wheel chair and how they are going to transfer her out if it.  She reported that she continues to socialize in a limited and sporadic fashion, isolation is very difficult because she is a very social person.  Over weekend she and  traveled to visit her parents and had a very good time.  She appreciates  working with her father to do projects, as mother and sisters are also afflicted with MD, which leaves father with burden of caretaking to a large degree.      Pt discussed 'sense of purpose', and with LCSW, reflected on the different chapters of her life in terms of ability vs disability. As her disease progresses, she becomes more dependent, as well as well versed on the components of her disability and SSI.  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  Pt and LCSW discussed putting some stressful issues aside for now, to focus on surgery and recovery short term.  Because her disease progresses as time goes by, pt wants to keep open mind in what her options may be both in staying  vs option to be on her own. LCSW discussed how she phrases 'being alone', vs 'being on my own.  Pt wants to discuss next session her tendency to always apologize for asking caretakers for help, as it relates to husbands presence and attitude. Pt is not anxious about upcoming surgery.  Pt is notes not being too anxious or depressed this week, but is always on high alert for (husbands negative) comments or shifting moods that impact her.    Psychological  condition is generally: improving  Plan: Patient to F/U with Weekly psychotherapy for 45 minutes each session.    Visit Diagnosis:  1. Adjustment disorder with depressed mood         Time  Start Time: 1200  End Time: 1300  Total Time: 60  Dorene England LCSW @ 4:25 PM

## 2020-07-10 ENCOUNTER — CONSULT (OUTPATIENT)
Dept: FAMILY MEDICINE | Facility: CLINIC | Age: 45
End: 2020-07-10
Payer: COMMERCIAL

## 2020-07-10 VITALS
WEIGHT: 127 LBS | HEIGHT: 67 IN | RESPIRATION RATE: 16 BRPM | OXYGEN SATURATION: 99 % | BODY MASS INDEX: 19.93 KG/M2 | DIASTOLIC BLOOD PRESSURE: 82 MMHG | SYSTOLIC BLOOD PRESSURE: 120 MMHG | TEMPERATURE: 98 F | HEART RATE: 72 BPM

## 2020-07-10 DIAGNOSIS — K90.0 CELIAC DISEASE: ICD-10-CM

## 2020-07-10 DIAGNOSIS — G71.00 MUSCULAR DYSTROPHY (CMS/HCC): Primary | ICD-10-CM

## 2020-07-10 DIAGNOSIS — M77.12 LEFT LATERAL EPICONDYLITIS: ICD-10-CM

## 2020-07-10 DIAGNOSIS — M25.511 CHRONIC RIGHT SHOULDER PAIN: ICD-10-CM

## 2020-07-10 DIAGNOSIS — R39.81 FUNCTIONAL URINARY INCONTINENCE: ICD-10-CM

## 2020-07-10 DIAGNOSIS — G89.29 CHRONIC RIGHT SHOULDER PAIN: ICD-10-CM

## 2020-07-10 PROCEDURE — 93000 ELECTROCARDIOGRAM COMPLETE: CPT | Performed by: INTERNAL MEDICINE

## 2020-07-10 PROCEDURE — 99214 OFFICE O/P EST MOD 30 MIN: CPT | Mod: 25 | Performed by: INTERNAL MEDICINE

## 2020-07-10 SDOH — HEALTH STABILITY: MENTAL HEALTH: HOW OFTEN DO YOU HAVE A DRINK CONTAINING ALCOHOL?: NEVER

## 2020-07-10 NOTE — PROGRESS NOTES
"Subjective      Patient ID: Sofia Valente is a 45 y.o. female.  1975      44 y/o female presents for pre-operative evaluation   Removal of bone stimulator - right shoulder 7/28/2020    Pain has been debilitating   Between right shoulder and left tennis elbow   Left elbow injection has helped 70 %     Wheelchair bound but makes transfers without CP nor SOB   No easy bruising, bleeding   Has tolerated anesthesia well in the past     Recently increased GI symptoms - Nausea and diarrhea  Some unintentional weight loss  +++ Stress           The following have been reviewed and updated as appropriate in this visit:  Problems       Review of Systems   All other systems reviewed and are negative.      Objective     Vitals:    07/10/20 1150   BP: 120/82   BP Location: Left upper arm   Patient Position: Sitting   Pulse: 72   Resp: 16   Temp: 36.7 °C (98 °F)   TempSrc: Temporal   SpO2: 99%   Weight: 57.6 kg (127 lb)   Height: 1.702 m (5' 7\")     Body mass index is 19.89 kg/m².    Physical Exam   Constitutional: She is oriented to person, place, and time. She appears well-developed and well-nourished.   HENT:   Head: Normocephalic and atraumatic.   Cardiovascular: Normal rate, regular rhythm, normal heart sounds and intact distal pulses.   No murmur heard.  Pulmonary/Chest: Effort normal and breath sounds normal. No respiratory distress. She has no wheezes. She has no rales.   Musculoskeletal: Normal range of motion.   Neurological: She is alert and oriented to person, place, and time.   Skin: Skin is warm and dry. Capillary refill takes less than 2 seconds.       Assessment/Plan   Diagnoses and all orders for this visit:    Muscular dystrophy (CMS/HCC) (Primary)  -     Ambulatory referral to Physical Therapy; Future  -     CBC and Differential; Future  -     Comprehensive metabolic panel; Future  -     CBC and Differential  -     Comprehensive metabolic panel  Patient is Intermediate risk for shoulder surgery "   Her only risk factor is her limited activity.   However she has no anginal symptoms upon transfers which is a high metabolic activity.   EKG - sinus 75 Axis normal, no ST changes, No T wave inversions intervals wnl   Do not take any NSAIDs (Motrin, Advil, Ibuprofen, Naproxen, Aleve, Aspirin or any prescription) 7 days prior to surgery  Also please do not take if taking fish oil 7 days prior to surgery   She has tolerated anesthesias well in the past and has had no change in her cardio-pulmonary status since.  She will hold oxybutynin day of surgery  Proceed with surgery     Chronic right shoulder pain    Celiac disease  -     CBC and Differential; Future  -     Comprehensive metabolic panel; Future  -     CBC and Differential  -     Comprehensive metabolic panel    Functional urinary incontinence    Left lateral epicondylitis

## 2020-07-10 NOTE — PATIENT INSTRUCTIONS
Do not take any NSAIDs (Motrin, Advil, Ibuprofen, Naproxen, Aleve, Aspirin or any prescription) 7 days prior to surgery  Also please do not take if taking fish oil 7 days prior to surgery   Hold Oxybutynin day of surgery   Continue stretches for left elbow pain

## 2020-07-11 LAB
ALBUMIN SERPL-MCNC: 4.9 G/DL (ref 3.8–4.8)
ALBUMIN/GLOB SERPL: 2.1 {RATIO} (ref 1.2–2.2)
ALP SERPL-CCNC: 42 IU/L (ref 39–117)
ALT SERPL-CCNC: 18 IU/L (ref 0–32)
AST SERPL-CCNC: 17 IU/L (ref 0–40)
BASOPHILS # BLD AUTO: 0 X10E3/UL (ref 0–0.2)
BASOPHILS NFR BLD AUTO: 0 %
BILIRUB SERPL-MCNC: 0.7 MG/DL (ref 0–1.2)
BUN SERPL-MCNC: 9 MG/DL (ref 6–24)
BUN/CREAT SERPL: 36 (ref 9–23)
CALCIUM SERPL-MCNC: 9.5 MG/DL (ref 8.7–10.2)
CHLORIDE SERPL-SCNC: 101 MMOL/L (ref 96–106)
CO2 SERPL-SCNC: 23 MMOL/L (ref 20–29)
CREAT SERPL-MCNC: 0.25 MG/DL (ref 0.57–1)
EOSINOPHIL # BLD AUTO: 0 X10E3/UL (ref 0–0.4)
EOSINOPHIL NFR BLD AUTO: 0 %
ERYTHROCYTE [DISTWIDTH] IN BLOOD BY AUTOMATED COUNT: 12.9 % (ref 11.7–15.4)
GLOBULIN SER CALC-MCNC: 2.3 G/DL (ref 1.5–4.5)
GLUCOSE SERPL-MCNC: 99 MG/DL (ref 65–99)
HCT VFR BLD AUTO: 42.6 % (ref 34–46.6)
HGB BLD-MCNC: 14.2 G/DL (ref 11.1–15.9)
IMM GRANULOCYTES # BLD AUTO: 0 X10E3/UL (ref 0–0.1)
IMM GRANULOCYTES NFR BLD AUTO: 0 %
LAB CORP EGFR IF AFRICN AM: 169 ML/MIN/1.73
LAB CORP EGFR IF NONAFRICN AM: 146 ML/MIN/1.73
LYMPHOCYTES # BLD AUTO: 0.9 X10E3/UL (ref 0.7–3.1)
LYMPHOCYTES NFR BLD AUTO: 11 %
MCH RBC QN AUTO: 31.3 PG (ref 26.6–33)
MCHC RBC AUTO-ENTMCNC: 33.3 G/DL (ref 31.5–35.7)
MCV RBC AUTO: 94 FL (ref 79–97)
MONOCYTES # BLD AUTO: 0.7 X10E3/UL (ref 0.1–0.9)
MONOCYTES NFR BLD AUTO: 9 %
NEUTROPHILS # BLD AUTO: 6.6 X10E3/UL (ref 1.4–7)
NEUTROPHILS NFR BLD AUTO: 80 %
PLATELET # BLD AUTO: 239 X10E3/UL (ref 150–450)
POTASSIUM SERPL-SCNC: 4.7 MMOL/L (ref 3.5–5.2)
PROT SERPL-MCNC: 7.2 G/DL (ref 6–8.5)
RBC # BLD AUTO: 4.54 X10E6/UL (ref 3.77–5.28)
SODIUM SERPL-SCNC: 143 MMOL/L (ref 134–144)
WBC # BLD AUTO: 8.3 X10E3/UL (ref 3.4–10.8)

## 2020-07-16 ENCOUNTER — TELEMEDICINE (OUTPATIENT)
Dept: PSYCHIATRY | Facility: HOSPITAL | Age: 45
End: 2020-07-16
Attending: SOCIAL WORKER
Payer: COMMERCIAL

## 2020-07-16 DIAGNOSIS — F43.21 ADJUSTMENT DISORDER WITH DEPRESSED MOOD: Primary | ICD-10-CM

## 2020-07-16 PROCEDURE — 90837 PSYTX W PT 60 MINUTES: CPT | Mod: 95 | Performed by: SOCIAL WORKER

## 2020-07-16 ASSESSMENT — COGNITIVE AND FUNCTIONAL STATUS - GENERAL
ATTENTION: WNL
INSIGHT: INTACT
IMPULSE CONTROL: INTACT
PERCEPTUAL FUNCTION: NORMAL
EST. PREMORBID INTELLIGENCE: AVERAGE
PSYCHOMOTOR FUNCTIONING: WNL
SLEEP_WAKE_CYCLE: DECREASED
CONCENTRATION: WNL
AFFECT: FULL RANGE
APPEARANCE: WELL GROOMED
THOUGHT_PROCESS: WNL
SPEECH: REGULAR
RECENT MEMORY: WNL
AROUSAL LEVEL: ALERT
THOUGHT_CONTENT: APPROPRIATE
DELUSIONS: NONE OR AGE APPROPRIATE
APPETITE: DECREASED
ORIENTATION: FULLY ORIENTED
REMOTE MEMORY: WNL
EYE_CONTACT: WNL
LIBIDO: NO CHANGE
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
## 2020-07-17 RX ORDER — TRAMADOL HYDROCHLORIDE 50 MG/1
50 TABLET ORAL EVERY 8 HOURS PRN
Qty: 60 TABLET | Refills: 0 | Status: SHIPPED | OUTPATIENT
Start: 2020-07-17 | End: 2020-09-02

## 2020-07-21 ENCOUNTER — TELEMEDICINE (OUTPATIENT)
Dept: PSYCHIATRY | Facility: HOSPITAL | Age: 45
End: 2020-07-21
Attending: SOCIAL WORKER
Payer: COMMERCIAL

## 2020-07-21 DIAGNOSIS — F43.21 ADJUSTMENT DISORDER WITH DEPRESSED MOOD: Primary | ICD-10-CM

## 2020-07-21 DIAGNOSIS — F41.1 GENERALIZED ANXIETY DISORDER: ICD-10-CM

## 2020-07-21 PROCEDURE — 90834 PSYTX W PT 45 MINUTES: CPT | Mod: 95 | Performed by: SOCIAL WORKER

## 2020-07-21 ASSESSMENT — COGNITIVE AND FUNCTIONAL STATUS - GENERAL
ORIENTATION: FULLY ORIENTED
MOOD: EUTHYMIC (NORMAL);HOPEFUL
EYE_CONTACT: WNL
SLEEP_WAKE_CYCLE: NO CHANGE
REMOTE MEMORY: WNL
INSIGHT: INTACT
PSYCHOMOTOR FUNCTIONING: WNL
THOUGHT_CONTENT: APPROPRIATE
IMPULSE CONTROL: INTACT
ATTENTION: WNL
APPETITE: NO CHANGE
EST. PREMORBID INTELLIGENCE: AVERAGE
SPEECH: REGULAR
PERCEPTUAL FUNCTION: NORMAL
CONCENTRATION: WNL
AFFECT: FULL RANGE
THOUGHT_PROCESS: WNL
APPEARANCE: WELL GROOMED
RECENT MEMORY: WNL
AROUSAL LEVEL: ALERT
DELUSIONS: NONE OR AGE APPROPRIATE
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
## 2020-08-04 ENCOUNTER — TELEMEDICINE (OUTPATIENT)
Dept: PSYCHIATRY | Facility: HOSPITAL | Age: 45
End: 2020-08-04
Payer: COMMERCIAL

## 2020-08-04 DIAGNOSIS — F43.21 ADJUSTMENT DISORDER WITH DEPRESSED MOOD: Primary | ICD-10-CM

## 2020-08-04 PROCEDURE — 90834 PSYTX W PT 45 MINUTES: CPT | Mod: 95 | Performed by: SOCIAL WORKER

## 2020-08-04 ASSESSMENT — COGNITIVE AND FUNCTIONAL STATUS - GENERAL
RECENT MEMORY: WNL
EYE_CONTACT: WNL
PERCEPTUAL FUNCTION: NORMAL
DELUSIONS: NONE OR AGE APPROPRIATE
INSIGHT: INTACT
SLEEP_WAKE_CYCLE: NO CHANGE
MOOD: EUTHYMIC (NORMAL);DEPRESSED;FRUSTRATED
THOUGHT_CONTENT: APPROPRIATE
REMOTE MEMORY: WNL
SPEECH: REGULAR
AFFECT: FULL RANGE
AROUSAL LEVEL: ALERT
ATTENTION: WNL
APPETITE: NO CHANGE
CONCENTRATION: WNL
PSYCHOMOTOR FUNCTIONING: WNL
IMPULSE CONTROL: INTACT
ORIENTATION: FULLY ORIENTED
APPEARANCE: WELL GROOMED
THOUGHT_PROCESS: WNL
EST. PREMORBID INTELLIGENCE: AVERAGE
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
## 2020-08-11 ENCOUNTER — TELEMEDICINE (OUTPATIENT)
Dept: PSYCHIATRY | Facility: HOSPITAL | Age: 45
End: 2020-08-11
Attending: SOCIAL WORKER
Payer: COMMERCIAL

## 2020-08-11 DIAGNOSIS — F43.21 ADJUSTMENT DISORDER WITH DEPRESSED MOOD: Primary | ICD-10-CM

## 2020-08-11 PROCEDURE — 90837 PSYTX W PT 60 MINUTES: CPT | Mod: 95 | Performed by: SOCIAL WORKER

## 2020-08-11 ASSESSMENT — COGNITIVE AND FUNCTIONAL STATUS - GENERAL
DELUSIONS: NONE OR AGE APPROPRIATE
IMPULSE CONTROL: INTACT
THOUGHT_PROCESS: WNL
SPEECH: REGULAR
PERCEPTUAL FUNCTION: NORMAL
EYE_CONTACT: WNL
ATTENTION: WNL
RECENT MEMORY: WNL
APPEARANCE: WELL GROOMED
MOOD: EUTHYMIC (NORMAL);ANXIOUS;DEPRESSED
APPETITE: NO CHANGE
PSYCHOMOTOR FUNCTIONING: WNL
CONCENTRATION: WNL
EST. PREMORBID INTELLIGENCE: AVERAGE
AROUSAL LEVEL: ALERT
REMOTE MEMORY: WNL
LIBIDO: NON-CONTRIBUTORY
SLEEP_WAKE_CYCLE: NO CHANGE
ORIENTATION: FULLY ORIENTED
THOUGHT_CONTENT: APPROPRIATE
INSIGHT: INTACT
AFFECT: FULL RANGE

## 2020-08-18 ENCOUNTER — TELEPHONE (OUTPATIENT)
Dept: SCHEDULING | Facility: REHABILITATION | Age: 45
End: 2020-08-18

## 2020-08-18 ENCOUNTER — TELEMEDICINE (OUTPATIENT)
Dept: PSYCHIATRY | Facility: HOSPITAL | Age: 45
End: 2020-08-18
Attending: SOCIAL WORKER
Payer: COMMERCIAL

## 2020-08-18 DIAGNOSIS — F43.21 ADJUSTMENT DISORDER WITH DEPRESSED MOOD: Primary | ICD-10-CM

## 2020-08-18 PROCEDURE — 90834 PSYTX W PT 45 MINUTES: CPT | Mod: 95 | Performed by: SOCIAL WORKER

## 2020-08-18 ASSESSMENT — COGNITIVE AND FUNCTIONAL STATUS - GENERAL
SLEEP_WAKE_CYCLE: NO CHANGE
EYE_CONTACT: WNL
ORIENTATION: FULLY ORIENTED
PSYCHOMOTOR FUNCTIONING: WNL
EST. PREMORBID INTELLIGENCE: AVERAGE
APPETITE: NO CHANGE
SPEECH: REGULAR
RECENT MEMORY: WNL
THOUGHT_CONTENT: APPROPRIATE
AROUSAL LEVEL: ALERT
CONCENTRATION: WNL
DELUSIONS: NONE OR AGE APPROPRIATE
APPEARANCE: WELL GROOMED
IMPULSE CONTROL: INTACT
PERCEPTUAL FUNCTION: NORMAL
INSIGHT: INTACT
REMOTE MEMORY: WNL
AFFECT: FULL RANGE
ATTENTION: WNL
THOUGHT_PROCESS: WNL
LIBIDO: NON-CONTRIBUTORY
MOOD: EUTHYMIC (NORMAL);HOPEFUL;DEPRESSED

## 2020-08-18 NOTE — PROGRESS NOTES
Request for Consent  Patient provided verbal consent to treat via telemedicine. Clinician introduced the secure telemedicine platform that we are utilizing to provide care during the COVID-19 pandemic. Patient understands the session will be billed to their insurance or patient directly.  Patient was informed only the patient and the clinician are permitted on?the video conference, sessions are not recorded by the clinician, and the patient is not permitted to record the session.? Patient was provided clinician's unique meeting ID prior to session, patient was asked to arrive to virtual session on time just as patient would if we were in the office. Clinician confirmed identification of patient by name and birthdate, provider name, location of patient and clinician, and callback number in case disconnected.  Patient Response to Request for Consent: Yes  Visit Type performed: Audio and Video Sofia Valente is a 45 y.o. female who presents for Follow-up.     Presenting Problem:  Pt reports more frequent depressive symptoms and crying spells.  She continues to have pain on site from recent surgery (removal of bone stimulator), and has not yet increased dosage of tramadol to ameliorate pain, but agreed to consider, as PA had suggested. Getting along better with .      Mental Status Exam:  Arousal Level: Alert  Appearance: Well Groomed  Speech: Regular  Psychomotor Functioning: WNL  Eye Contact: WNL  Est. Premorbid Intelligence: Average  Orientation: Fully oriented  Attention: WNL  Concentration: WNL  Recent Memory: WNL  Remote Memory: WNL  Thought Content: Appropriate  Thought Process: WNL  Insight: Intact  Perceptual Function: Normal  Delusions: None or age appropriate  Sleeping: No Change  Appetite: No Change  Libido: Non-Contributory  Affect: Full Range  Mood: Euthymic (normal), Hopeful, Depressed     [place] Suicide Severity Rating Scale  Not done today       Assessment:   Pt reports continued pain  from surgery site, and wondering if this is 'the new normal'.  Pt has had several crying spells for no notable reason.  Pt has volunteer projects to work on, but lacks motivation to follow through.    Pt attributes improvement in communication with  due to our work in changing her response to his 'gaslighting' and taunting her.  He also stopped shaming her for her past DUI and/or drinking since she no longer drinks.  He has recently told pt he will no longer go out because of pandemic, so their socialization is limited as couple, however pt will continue to see her friends and engage with her personal care attendants.  Pt seeing one of her caretakers tomorrow, who can do some PT therapy with her, and will see two good friends in a few days, one also dx with MD, the other symptomatic of MD but not diagnosed.  Pt agrees that when she is busy and helping others she is less depressed.  Pt anxious to meet with psychiatrist to determine if med management is appropriate for her depressive symptoms.    Psychological condition is generally: showing no change  Plan: Patient to F/U with Weekly psychotherapy for 45 minutes each session.    Visit Diagnosis:  1. Adjustment disorder with depressed mood         Time  Start Time: 1200  End Time: 1245  Total Time: 45  Dorene England LCSW @ 12:54 PM

## 2020-08-18 NOTE — TELEPHONE ENCOUNTER
Called pt back: Left message for pt that her PT evaluation would need to be set at [facility]. Under the Spine Provider group. Spoke to Nellie from Scheduling and she informed me that the evaluation could not be completed at [place]

## 2020-08-20 ENCOUNTER — TELEPHONE (OUTPATIENT)
Dept: PRIMARY CARE | Facility: CLINIC | Age: 45
End: 2020-08-20

## 2020-08-20 NOTE — TELEPHONE ENCOUNTER
Pt following up on Nellie good msg , explained that her DrIna Wants her to have a [facility] evaluation which is only offered at the [facility] . Pt stated she will speak with her DrIna to see if she can get an another scrit b/c [facility] is not convenient for her b/c of distance and she would need assistance from a caregiver

## 2020-08-27 ENCOUNTER — TELEMEDICINE (OUTPATIENT)
Dept: PSYCHIATRY | Facility: HOSPITAL | Age: 45
End: 2020-08-27
Attending: SOCIAL WORKER
Payer: COMMERCIAL

## 2020-08-27 DIAGNOSIS — F43.21 ADJUSTMENT DISORDER WITH DEPRESSED MOOD: Primary | ICD-10-CM

## 2020-08-27 PROCEDURE — 90834 PSYTX W PT 45 MINUTES: CPT | Mod: 95 | Performed by: SOCIAL WORKER

## 2020-08-27 ASSESSMENT — COGNITIVE AND FUNCTIONAL STATUS - GENERAL
SLEEP_WAKE_CYCLE: NO CHANGE
THOUGHT_CONTENT: APPROPRIATE
PSYCHOMOTOR FUNCTIONING: WNL
INSIGHT: INTACT
SPEECH: REGULAR
THOUGHT_PROCESS: WNL
RECENT MEMORY: WNL
APPEARANCE: WELL GROOMED
PERCEPTUAL FUNCTION: NORMAL
APPETITE: NO CHANGE
ATTENTION: WNL
AROUSAL LEVEL: ALERT
IMPULSE CONTROL: INTACT
DELUSIONS: NONE OR AGE APPROPRIATE
MOOD: EUTHYMIC (NORMAL)
EST. PREMORBID INTELLIGENCE: AVERAGE
ORIENTATION: FULLY ORIENTED
LIBIDO: NON-CONTRIBUTORY
EYE_CONTACT: WNL
CONCENTRATION: WNL
REMOTE MEMORY: WNL
AFFECT: FULL RANGE

## 2020-08-27 NOTE — PROGRESS NOTES
Request for Consent  Patient provided verbal consent to treat via telemedicine. Clinician introduced the secure telemedicine platform that we are utilizing to provide care during the COVID-19 pandemic. Patient understands the session will be billed to their insurance or patient directly.  Patient was informed only the patient and the clinician are permitted on?the video conference, sessions are not recorded by the clinician, and the patient is not permitted to record the session.? Patient was provided clinician's unique meeting ID prior to session, patient was asked to arrive to virtual session on time just as patient would if we were in the office. Clinician confirmed identification of patient by name and birthdate, provider name, location of patient and clinician, and callback number in case disconnected.  Patient Response to Request for Consent: Yes  Visit Type performed: Audio and Video Sofia Valente is a 45 y.o. female who presents for Follow-up.     Presenting Problem:  Pt reports keeping busy and managing home projects with the help of personal care attendant.  Ongoing issues in marriage continue to  arise.      Mental Status Exam:  Arousal Level: Alert  Appearance: Well Groomed  Speech: Regular  Psychomotor Functioning: WNL  Eye Contact: WNL  Est. Premorbid Intelligence: Average  Orientation: Fully oriented  Attention: WNL  Concentration: WNL  Recent Memory: WNL  Remote Memory: WNL  Thought Content: Appropriate  Thought Process: WNL  Insight: Intact  Perceptual Function: Normal  Delusions: None or age appropriate  Sleeping: No Change  Appetite: No Change  Libido: Non-Contributory  Affect: Full Range  Mood: Euthymic (normal)     [place] Suicide Severity Rating Scale  Not done today       Assessment:   Pt reports being as busy as possible with home projects, and with the assistance of her personal care attendant, who is also yoga certified. She continues to miss social engagement and reminisces  about when she was able to work and volunteer.  Ongoing issue with marriage continue to arise.  Pt and REXW discuss unrealistic expectations as a reason for disappointment.      Pt is looking forward to attending new [employer] as the fall unfolds.  She continues to easily get depressed and although not mentioned in todays visit, has had some decline in mobility.   Pt continues to search for activities to fill her time, and friends to engage with. As summer unwinds, she notes that it will become more difficult to do this in winter weather.  Pt asked about a referral for new PCP who understands her multiple diseases.    Psychological condition is generally: showing no change  Plan: Patient to F/U with Weekly psychotherapy for 45 minutes each session.    Visit Diagnosis:  No primary diagnosis found.     Time  Start Time: 1500  End Time: 1552  Total Time: 52  Dorene England LCSW @ 5:49 PM

## 2020-09-01 ENCOUNTER — TELEPHONE (OUTPATIENT)
Dept: FAMILY MEDICINE | Facility: CLINIC | Age: 45
End: 2020-09-01

## 2020-09-02 ENCOUNTER — HOSPITAL ENCOUNTER (OUTPATIENT)
Dept: PHYSICAL THERAPY | Facility: REHABILITATION | Age: 45
Setting detail: THERAPIES SERIES
Discharge: HOME | End: 2020-09-02
Attending: PHYSICAL MEDICINE & REHABILITATION
Payer: COMMERCIAL

## 2020-09-02 DIAGNOSIS — G71.02 FACIOSCAPULOHUMERAL MUSCULAR DYSTROPHY (CMS/HCC): Primary | ICD-10-CM

## 2020-09-02 PROCEDURE — 97755 ASSISTIVE TECHNOLOGY ASSESS: CPT | Mod: GP

## 2020-09-02 PROCEDURE — 97164 PT RE-EVAL EST PLAN CARE: CPT | Mod: GP

## 2020-09-02 NOTE — PROGRESS NOTES
Referring Provider: By co-signing this Plan of Care (POC), you agree with the planned services and interventions recommended by the therapist.       NAME: _____________________________ DATE: _______________      [employer] [employer] Fax: [phone]      PT RE-EVALUATION FOR OUTPATIENT THERAPY    Patient: Sofia Valente   MRN: [id number]  : 1975 45 y.o.  Referring Physician: Dmitri Donald MD  Date of Visit: 2020      New Certification Dates: 20 through 21    Recommended Frequency & Duration:  Other for up to 6 months     Diagnosis:   1. Facioscapulohumeral muscular dystrophy (CMS/HCC)        Chief Complaints:  No chief complaint on file.      Precautions:   Existing Precautions/Restrictions: fall        DELIVERY NOTE for ASSISTIVE TECHNOLOGY     Long Term Goals Time Frame Result Comment/Progress   Independent mobility in new Quantum Stretto power wheelchair  24 weeks MET        Good postural support in new seating system 24 weeks Partially met Ongoing adjustments as needed for patient independence with function   Good skin pressure management in new seating system 24 weeks MET     Independent transfers to/from bed/toilet/car seat from new Stretto PWC 8 weeks MET Pt is performing 10-12 transfers/day        Serial number of new wheelchair: [id number]      Intervention and results:Sofia Valente is a 45 y.o. female who was seen today for a follow-up visit for ongoing fitting and delivery of the new Quantum Q6 Edge 3 Stretto wheelchair, delivered as ordered by SARMAD Mckinney of [facility] [place] .  Sofia was transferred into the new wheelchair and adjustments were made to optimize posture and alignment as her positioning and alignment is so specific given her proximal weakness due to her FSH MD.  Adjustments included fine tuning of the position of the elbow stops B/L and trial of recline with use of elbow stops to ensure independent joystick access with success.   Reinforcement education regarding use of the power seat functions for pain management and repositioning to avoid strain on her severe lumbar lordosis due to her postural asymmetries, pt verbalized understanding.   The backrest was moved anteriorly and Sofia will live with the change and determine if this will functionally work for her.  The footplates were lowered for support under her feet.  Simulation of access to cupboards and other places in her home.  Sofia reported access and comfort with her current set-up.      Education provided in these areas:  Weight shifting  Care of new wheelchair   Skin pressure management  How to get service for mobility device  Use of transit tie-downs  Charging batteries            Plan:   Sofia Valente took the new wheelchair home with her.  She will live in the PWC with the changes made today and f/u in clinic as needed for further adjustment.

## 2020-09-02 NOTE — LETTER
[address] [address]  [phone]  [facility] [employer] Fax: [phone]    PHYSICAL THERAPY PLAN OF CARE    Patient Name: Sofia Valente    Certification Dates:  From 20  To: 21  Frequency: Other Duration: 6 months  Other: 1-4 follow-up visits    Provider: April Thomas, PT     Referring Provider: Pepe Choudhury DO  PCP: Pepe Choudhury DO        Payor: Payor: [facility] / Plan: [facility] PPO/HMO / Product Type: Managed Care /   Medical Diagnosis: Facioscapulohumeral muscular dystrophy (CMS/HCC) [G71.02]       Thank you for this referral. Please contact our department with any questions.      April Thomas, PT    Referring Provider: By co-signing this Plan of Care (POC), you agree with the planned services and interventions recommended by the therapist.       NAME: _____________________________ DATE: _______________      [facility] [employer] Fax: [phone]      PT RE-EVALUATION FOR OUTPATIENT THERAPY    Patient: Sofia Valente   MRN: [id number]  : 1975 45 y.o.  Referring Physician: Dmitri Donald MD  Date of Visit: 2020      New Certification Dates: 20 through 21    Recommended Frequency & Duration:  Other for up to 6 months     Diagnosis:   1. Facioscapulohumeral muscular dystrophy (CMS/HCC)        Chief Complaints:  No chief complaint on file.      Precautions:   Existing Precautions/Restrictions: fall        DELIVERY NOTE for ASSISTIVE TECHNOLOGY     Long Term Goals Time Frame Result Comment/Progress   Independent mobility in new Quantum StrettGoPlaceIt power wheelchair  24 weeks MET        Good postural support in new seating system 24 weeks Partially met Ongoing adjustments as needed for patient independence with function   Good skin pressure management in new seating system 24 weeks MET     Independent transfers to/from bed/toilet/car seat from new Stretto PWC 8 weeks MET Pt is performing 10-12 transfers/day         Serial number of new wheelchair: [id number]      Intervention and results:Sofia Valente is a 45 y.o. female who was seen today for a follow-up visit for ongoing fitting and delivery of the new Quantum Q6 Edge 3 Stretto wheelchair, delivered as ordered by SARMAD Mckinney of [facility] .  Sofia was transferred into the new wheelchair and adjustments were made to optimize posture and alignment as her positioning and alignment is so specific given her proximal weakness due to her FSH MD.  Adjustments included fine tuning of the position of the elbow stops B/L and trial of recline with use of elbow stops to ensure independent joystick access with success.  Reinforcement education regarding use of the power seat functions for pain management and repositioning to avoid strain on her severe lumbar lordosis due to her postural asymmetries, pt verbalized understanding.   The backrest was moved anteriorly and Sofia will live with the change and determine if this will functionally work for her.  The footplates were lowered for support under her feet.  Simulation of access to cupboards and other places in her home.  Sofia reported access and comfort with her current set-up.      Education provided in these areas:  Weight shifting  Care of new wheelchair   Skin pressure management  How to get service for mobility device  Use of transit tie-downs  Charging batteries            Plan:   Sofia Valente took the new wheelchair home with her.  She will live in the PWC with the changes made today and f/u in clinic as needed for further adjustment.

## 2020-09-02 NOTE — TELEPHONE ENCOUNTER
Medicine Refill Request    Last Office Visit: Visit date not found  Last Telemedicine Visit: 5/1/2020 Pepe Choudhury DO    Next Office Visit: Visit date not found  Next Telemedicine Visit: Visit date not found         Current Outpatient Medications:   •  naproxen (NAPROSYN) 375 mg tablet, Take 1 tablet (375 mg total) by mouth 2 (two) times a day with meals., Disp: 180 tablet, Rfl: 1  •  oxybutynin (DITROPAN) 5 mg tablet, Take 5 mg by mouth 3 (three) times a day., Disp: , Rfl:   •  zolpidem (AMBIEN) 5 mg tablet, Take 1 tablet (5 mg total) by mouth nightly., Disp: 30 tablet, Rfl: 0      BP Readings from Last 3 Encounters:   07/10/20 120/82   05/02/19 135/72       Recent Lab results:  No results found for: CHOL, No results found for: HDL, No results found for: LDLCALC, No results found for: TRIG     Lab Results   Component Value Date    GLUCOSE 99 07/10/2020   , No results found for: HGBA1C      Lab Results   Component Value Date    CREATININE 0.25 (L) 07/10/2020       Lab Results   Component Value Date    TSH 1.51 09/14/2017

## 2020-09-02 NOTE — OP PT TREATMENT LOG
DELIVERY NOTE for ASSISTIVE TECHNOLOGY     Long Term Goals Time Frame Result Comment/Progress   Independent mobility in new Quantum Stretto power wheelchair  24 weeks MET        Good postural support in new seating system 24 weeks Partially met Ongoing adjustments as needed for patient independence with function   Good skin pressure management in new seating system 24 weeks MET     Independent transfers to/from bed/toilet/car seat from new Stretto PWC 8 weeks MET Pt is performing 10-12 transfers/day        Serial number of new wheelchair: [id number]      Intervention and results:Sofia Valente is a 45 y.o. female who was seen today for a follow-up visit for ongoing fitting and delivery of the new Quantum Q6 Edge 3 Stretto wheelchair, delivered as ordered by SARMAD Mckinney of [facility] .  Sofia was transferred into the new wheelchair and adjustments were made to optimize posture and alignment as her positioning and alignment is so specific given her proximal weakness due to her FSH MD.  Adjustments included fine tuning of the position of the elbow stops B/L and trial of recline with use of elbow stops to ensure independent joystick access with success.  Reinforcement education regarding use of the power seat functions for pain management and repositioning to avoid strain on her severe lumbar lordosis due to her postural asymmetries, pt verbalized understanding.   The backrest was moved anteriorly and Sofia will live with the change and determine if this will functionally work for her.  The footplates were lowered for support under her feet.  Simulation of access to cupboards and other places in her home.  Sofia reported access and comfort with her current set-up.      Education provided in these areas:  Weight shifting  Care of new wheelchair   Skin pressure management  How to get service for mobility device  Use of transit tie-downs  Charging batteries            Plan:   Sofia Contreras  Trae took the new wheelchair home with her.  She will live in the PWC with the changes made today and f/u in clinic as needed for further adjustment.

## 2020-09-03 RX ORDER — ZOLPIDEM TARTRATE 5 MG/1
5 TABLET ORAL NIGHTLY
Qty: 30 TABLET | Refills: 0 | Status: SHIPPED | OUTPATIENT
Start: 2020-09-03 | End: 2020-09-23 | Stop reason: SDUPTHER

## 2020-09-03 RX ORDER — TRAMADOL HYDROCHLORIDE 50 MG/1
50 TABLET ORAL EVERY 8 HOURS PRN
Qty: 60 TABLET | Refills: 0 | Status: SHIPPED | OUTPATIENT
Start: 2020-09-03 | End: 2020-10-03

## 2020-09-03 NOTE — TELEPHONE ENCOUNTER
Medicine Refill Request    Last Office Visit: Visit date not found  Last Telemedicine Visit: 5/1/2020 Pepe Choudhury DO    Next Office Visit: Visit date not found  Next Telemedicine Visit: Visit date not found         Current Outpatient Medications:   •  naproxen (NAPROSYN) 375 mg tablet, Take 1 tablet (375 mg total) by mouth 2 (two) times a day with meals., Disp: 180 tablet, Rfl: 1  •  oxybutynin (DITROPAN) 5 mg tablet, Take 5 mg by mouth 3 (three) times a day., Disp: , Rfl:   •  traMADoL (ULTRAM) 50 mg tablet, Take 1 tablet (50 mg total) by mouth every 8 (eight) hours as needed for moderate pain., Disp: 60 tablet, Rfl: 0  •  zolpidem (AMBIEN) 5 mg tablet, Take 1 tablet (5 mg total) by mouth nightly., Disp: 30 tablet, Rfl: 0      BP Readings from Last 3 Encounters:   07/10/20 120/82   05/02/19 135/72       Recent Lab results:  No results found for: CHOL, No results found for: HDL, No results found for: LDLCALC, No results found for: TRIG     Lab Results   Component Value Date    GLUCOSE 99 07/10/2020   , No results found for: HGBA1C      Lab Results   Component Value Date    CREATININE 0.25 (L) 07/10/2020       Lab Results   Component Value Date    TSH 1.51 09/14/2017

## 2020-09-09 ENCOUNTER — TELEPHONE (OUTPATIENT)
Dept: PSYCHIATRY | Facility: HOSPITAL | Age: 45
End: 2020-09-09

## 2020-09-09 ENCOUNTER — TELEMEDICINE (OUTPATIENT)
Dept: PSYCHIATRY | Facility: HOSPITAL | Age: 45
End: 2020-09-09
Attending: PSYCHIATRY & NEUROLOGY
Payer: COMMERCIAL

## 2020-09-09 DIAGNOSIS — F33.1 MAJOR DEPRESSIVE DISORDER, RECURRENT, MODERATE (CMS/HCC): Primary | ICD-10-CM

## 2020-09-09 DIAGNOSIS — F41.1 GAD (GENERALIZED ANXIETY DISORDER): ICD-10-CM

## 2020-09-09 PROCEDURE — 90792 PSYCH DIAG EVAL W/MED SRVCS: CPT | Mod: 95 | Performed by: PSYCHIATRY & NEUROLOGY

## 2020-09-09 RX ORDER — FLUOXETINE 10 MG/1
CAPSULE ORAL
Qty: 67 CAPSULE | Refills: 0 | Status: SHIPPED | OUTPATIENT
Start: 2020-09-09 | End: 2020-09-14 | Stop reason: SINTOL

## 2020-09-09 ASSESSMENT — ENCOUNTER SYMPTOMS
FATIGUE: 1
FEVER: 0
CONSTIPATION: 0
COUGH: 0
HEADACHES: 0
ARTHRALGIAS: 1
NAUSEA: 0
DIARRHEA: 0
SHORTNESS OF BREATH: 0
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
## 2020-09-09 NOTE — TELEPHONE ENCOUNTER
Spoke with pharmacist @ [facility]. Reviewed potential concern for serotonin syndrome with combination of Prozac & Tramadol with Dr. Todd. Dr. Todd approved prozac to be taken with Tramadol and reviewed the risks with Sofia as well. Pharmacist aware.     Jen Olea RN

## 2020-09-11 ENCOUNTER — TELEPHONE (OUTPATIENT)
Dept: PSYCHIATRY | Facility: HOSPITAL | Age: 45
End: 2020-09-11

## 2020-09-11 NOTE — TELEPHONE ENCOUNTER
Reviewed side effects of Prozac with Dr. Lindsey. Recommendation from Dr. Lindsey is to d/c Prozac and pt can speak with Dr. Todd 9/14/2020. Pt aware to d/c Prozac & monitor s/s & if s/s persist/worsen to seek medical attention.

## 2020-09-11 NOTE — TELEPHONE ENCOUNTER
Covering for Dr. Todd. Reviewed her recent office note, no acute safety concerns. Given report of side effects to low dose fluoxetine to RN, would recommend patient discontinue medication for this weekend and follow up with Dr. Todd on Monday regarding plan to start even lower dose fluoxetine versus alternate agent.    Jenny Lindsey MD

## 2020-09-11 NOTE — TELEPHONE ENCOUNTER
"Spoke with pt by phone. Pt reports she took Prozac 10mg for first time yesterday and experienced rapid heart rate, dry heaving, felt like she wanted to\"jump out of skin.\" Pt didn't have this reaction with previous SSRI's in the past (Zoloft & Lexapro).  Reports tolerated Zoloft well in past. Pt took Ambien and did not sleep well last night, which usually is not the case. Pt still dry heaving this morning, increased anxiety. Pt reports increased sensitivity to medication in general, but tolerating other meds well.      Sleep - 6 hrs usually  Appetite - poor  Energy - low  Denies SI, HI  Mood - anxious      ROS - + fatigue, + nausea, + dry heaving    Pt reports she can wait to talk to Dr. Todd on Monday to review changing medication if necessary, Can go without meds over weekend. Pt did not take Prozac today, RN told pt to hold off until reviewed with doc. RN will review with doc today to make sure appropriate to d/c prozac for now.       Patient aware of how to contact office prior to next appointment with any issues, after-hours resources. Patient instructed to call 911 or go to nearest ED if thoughts of self-harm, suicide, or violence towards others   "

## 2020-09-14 ENCOUNTER — TELEPHONE (OUTPATIENT)
Dept: PSYCHIATRY | Facility: HOSPITAL | Age: 45
End: 2020-09-14

## 2020-09-14 RX ORDER — CITALOPRAM 10 MG/1
5 TABLET ORAL DAILY
Qty: 15 TABLET | Refills: 1 | Status: SHIPPED | OUTPATIENT
Start: 2020-09-14 | End: 2020-10-13 | Stop reason: SDUPTHER

## 2020-09-14 NOTE — TELEPHONE ENCOUNTER
Spoke with patient by phone regarding inability to tolerate Prozac.  Patient took first dose Thursday, September 10, experienced GI discomfort, feeling wired and tired at the same time.  Patient discontinued.    Reviewed ongoing symptoms of depression and anxiety.  Discussed risks and benefits of Celexa trial.  Will initiate Celexa 5 mg p.o. every morning and monitor for activation, GI side effects.    Patient denies dangerousness to self or others  Has therapy appointment scheduled 9/15/2020  Follow-up med check as scheduled in 3 - 4 weeks

## 2020-09-15 ENCOUNTER — TELEMEDICINE (OUTPATIENT)
Dept: PSYCHIATRY | Facility: HOSPITAL | Age: 45
End: 2020-09-15
Payer: COMMERCIAL

## 2020-09-15 DIAGNOSIS — F33.1 MAJOR DEPRESSIVE DISORDER, RECURRENT, MODERATE (CMS/HCC): Primary | ICD-10-CM

## 2020-09-15 PROCEDURE — 90837 PSYTX W PT 60 MINUTES: CPT | Mod: 95 | Performed by: SOCIAL WORKER

## 2020-09-15 ASSESSMENT — COGNITIVE AND FUNCTIONAL STATUS - GENERAL
PERCEPTUAL FUNCTION: NORMAL
ATTENTION: WNL
EST. PREMORBID INTELLIGENCE: AVERAGE
SLEEP_WAKE_CYCLE: DECREASED
RECENT MEMORY: WNL
REMOTE MEMORY: WNL
CONCENTRATION: WNL
IMPULSE CONTROL: INTACT
THOUGHT_PROCESS: WNL;WORRY
ORIENTATION: FULLY ORIENTED
LIBIDO: NON-CONTRIBUTORY
APPETITE: DECREASED
INSIGHT: INTACT
PSYCHOMOTOR FUNCTIONING: WNL
DELUSIONS: NONE OR AGE APPROPRIATE
AROUSAL LEVEL: ALERT
EYE_CONTACT: WNL
APPEARANCE: WELL GROOMED
AFFECT: FULL RANGE;TEARFUL
THOUGHT_CONTENT: APPROPRIATE
SPEECH: REGULAR
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
## 2020-09-22 ENCOUNTER — TELEMEDICINE (OUTPATIENT)
Dept: PSYCHIATRY | Facility: HOSPITAL | Age: 45
End: 2020-09-22
Attending: SOCIAL WORKER
Payer: COMMERCIAL

## 2020-09-22 DIAGNOSIS — F33.1 MAJOR DEPRESSIVE DISORDER, RECURRENT, MODERATE (CMS/HCC): Primary | ICD-10-CM

## 2020-09-22 PROCEDURE — 90834 PSYTX W PT 45 MINUTES: CPT | Mod: 95 | Performed by: SOCIAL WORKER

## 2020-09-22 ASSESSMENT — COGNITIVE AND FUNCTIONAL STATUS - GENERAL
SPEECH: REGULAR
EST. PREMORBID INTELLIGENCE: AVERAGE
PERCEPTUAL FUNCTION: NORMAL
CONCENTRATION: WNL
THOUGHT_CONTENT: APPROPRIATE
THOUGHT_PROCESS: WNL
IMPULSE CONTROL: INTACT
ATTENTION: WNL
APPETITE: NO CHANGE
REMOTE MEMORY: WNL
SLEEP_WAKE_CYCLE: NO CHANGE
EYE_CONTACT: WNL
PSYCHOMOTOR FUNCTIONING: WNL
INSIGHT: INTACT
APPEARANCE: WELL GROOMED
DELUSIONS: NONE OR AGE APPROPRIATE
LIBIDO: NON-CONTRIBUTORY
MOOD: EUTHYMIC (NORMAL);HOPEFUL
ORIENTATION: FULLY ORIENTED
AFFECT: FULL RANGE
RECENT MEMORY: WNL

## 2020-09-22 NOTE — PROGRESS NOTES
Request for Consent  Patient provided verbal consent to treat via telemedicine. Clinician introduced the secure telemedicine platform that we are utilizing to provide care during the COVID-19 pandemic. Patient understands the session will be billed to their insurance or patient directly.  Patient was informed only the patient and the clinician are permitted on?the video conference, sessions are not recorded by the clinician, and the patient is not permitted to record the session.? Patient was provided clinician's unique meeting ID prior to session, patient was asked to arrive to virtual session on time just as patient would if we were in the office. Clinician confirmed identification of patient by name and birthdate, provider name, location of patient and clinician, and callback number in case disconnected.  Patient Response to Request for Consent: Yes  Visit Type performed: Audio and Video Sofia Valente is a 45 y.o. female who presents for Follow-up.     Presenting Problem:  Pt reports feeling better and attributes that to medication.  She reports being less reactive in negative ways.      Mental Status Exam:  Appearance: Well Groomed  Speech: Regular  Psychomotor Functioning: WNL  Eye Contact: WNL  Est. Premorbid Intelligence: Average  Orientation: Fully oriented  Attention: WNL  Concentration: WNL  Recent Memory: WNL  Remote Memory: WNL  Thought Content: Appropriate  Thought Process: WNL  Insight: Intact  Perceptual Function: Normal  Delusions: None or age appropriate  Sleeping: No Change  Appetite: No Change  Libido: Non-Contributory  Affect: Full Range  Mood: Euthymic (normal), Hopeful     [place] Suicide Severity Rating Scale  Not done today       Assessment:   Pt reports feeling less reactive and negative, reporting she wonders if this is in part due to the medication she was prescribed.  Pt continues to socialize as much as possible and reported going to [place] over the weekend with  sister and friend, and 'feeling happiness', which she states she has not felt in a long time.  Pt was able to identify a constellation of factors that contributed to what helped her mood (ie, being with sister, friend, beautiful setting), and was also pleased to find  had done chores at home without being asked, for which she expressed her appreciation.    Pt and LCSW discussed marital relationship and codependency due to her physical condition coupled by her intense fear of being alone, isolated and helpless.  Pt continues to weigh her options, and although had given herself a deadline, was encouraged by LCSW not to pressure herself in that context if she needed more or less time to process what is best for her.    Pt going to see new physiatrist tomorrow to address chronic pain.  Pt going to see parents in two weeks, will contact office to cancel any appointments that fall on her visit to parents.    Psychological condition is generally: improving  Plan: Patient to F/U with Weekly psychotherapy for 45 minutes each session.    Visit Diagnosis:    ICD-10-CM ICD-9-CM   1. Major depressive disorder, recurrent, moderate (CMS/HCC)  F33.1 296.32        Time  Start Time: 1200  End Time: 1245  Total Time: 45  Dorene England LCSW @ 4:54 PM

## 2020-09-23 ENCOUNTER — TELEPHONE (OUTPATIENT)
Dept: FAMILY MEDICINE | Facility: CLINIC | Age: 45
End: 2020-09-23

## 2020-09-23 RX ORDER — OXYBUTYNIN CHLORIDE 5 MG/1
5 TABLET ORAL 3 TIMES DAILY
Qty: 270 TABLET | Refills: 1 | Status: SHIPPED | OUTPATIENT
Start: 2020-09-23 | End: 2021-03-12 | Stop reason: SDUPTHER

## 2020-09-23 RX ORDER — ZOLPIDEM TARTRATE 5 MG/1
5 TABLET ORAL NIGHTLY
Qty: 30 TABLET | Refills: 1 | Status: SHIPPED | OUTPATIENT
Start: 2020-09-23 | End: 2020-11-27 | Stop reason: SDUPTHER

## 2020-09-23 NOTE — TELEPHONE ENCOUNTER
Dr Choudhury  Patient wants to speak with you about the symptoms she's experiencing from the flu shot she received yesterday. She can be reached at [phone]

## 2020-09-23 NOTE — TELEPHONE ENCOUNTER
Fever 101.6 last night   Tylenol 99.3   Today 100  Since between 98.5  Self Testing COVID   Monitor likely due to flu shot

## 2020-09-23 NOTE — TELEPHONE ENCOUNTER
Patient got flu shot yesterday at pharmacy.    Had fever of 101.6 during the night.    Took Tylenol, went down 99.3    Now temp is normal. But having headache, swollen glands no sore throat    States no SOB but last night had  pain when taking in a deep breath    Asking to talk to Dr Choudhury to discuss    [phone]. OK to leave detailed message on voicemail or call right back if patient can't get to phone

## 2020-09-24 ENCOUNTER — APPOINTMENT (OUTPATIENT)
Dept: LAB | Age: 45
End: 2020-09-24
Attending: PSYCHIATRY & NEUROLOGY
Payer: COMMERCIAL

## 2020-09-24 ENCOUNTER — TELEPHONE (OUTPATIENT)
Dept: PSYCHIATRY | Facility: HOSPITAL | Age: 45
End: 2020-09-24

## 2020-09-24 DIAGNOSIS — F33.1 MAJOR DEPRESSIVE DISORDER, RECURRENT, MODERATE (CMS/HCC): ICD-10-CM

## 2020-09-24 DIAGNOSIS — F33.1 MAJOR DEPRESSIVE DISORDER, RECURRENT EPISODE, MODERATE (CMS/HCC): ICD-10-CM

## 2020-09-24 DIAGNOSIS — F33.1 MAJOR DEPRESSIVE DISORDER, RECURRENT EPISODE, MODERATE (CMS/HCC): Primary | ICD-10-CM

## 2020-09-24 LAB — TSH SERPL DL<=0.05 MIU/L-ACNC: 1.17 MIU/L (ref 0.34–5.6)

## 2020-09-24 PROCEDURE — 84443 ASSAY THYROID STIM HORMONE: CPT

## 2020-09-24 PROCEDURE — 36415 COLL VENOUS BLD VENIPUNCTURE: CPT

## 2020-09-24 PROCEDURE — 82306 VITAMIN D 25 HYDROXY: CPT

## 2020-09-24 NOTE — TELEPHONE ENCOUNTER
Pt lvm on medline requesting labs be sent to [facility] lab instead of [facility]. RN spoke with pt by phone, labs will be changed to [facility] lab, updated in pt chart. Pt will be out of town for next med refill. Pt aware to call RN with new pharmacy info if refill needed while she is out of town.

## 2020-09-25 LAB — 25(OH)D3 SERPL-MCNC: 19 NG/ML (ref 30–100)

## 2020-09-28 ENCOUNTER — TELEPHONE (OUTPATIENT)
Dept: PSYCHIATRY | Facility: HOSPITAL | Age: 45
End: 2020-09-28

## 2020-09-28 NOTE — TELEPHONE ENCOUNTER
Spoke with pt by phone to review lab results. Pt aware of Vitamin D deficiency and Dr. Todd's recommendation to take Vit D3 1,000 units daily as well as f/u with PCP. Pt has GI f/u end of October and appt with PCP beginning of November. Pt will be switching to new PCP Dr. Peña in [facility].

## 2020-09-29 ENCOUNTER — TELEMEDICINE (OUTPATIENT)
Dept: PSYCHIATRY | Facility: HOSPITAL | Age: 45
End: 2020-09-29
Attending: PSYCHIATRY & NEUROLOGY
Payer: COMMERCIAL

## 2020-09-29 ENCOUNTER — DOCUMENTATION (OUTPATIENT)
Dept: PSYCHIATRY | Facility: HOSPITAL | Age: 45
End: 2020-09-29

## 2020-09-29 DIAGNOSIS — F33.1 MAJOR DEPRESSIVE DISORDER, RECURRENT EPISODE, MODERATE (CMS/HCC): Primary | ICD-10-CM

## 2020-09-29 PROCEDURE — 90834 PSYTX W PT 45 MINUTES: CPT | Mod: 95 | Performed by: SOCIAL WORKER

## 2020-09-29 ASSESSMENT — COGNITIVE AND FUNCTIONAL STATUS - GENERAL
APPEARANCE: WELL GROOMED
SPEECH: REGULAR
THOUGHT_CONTENT: APPROPRIATE
PERCEPTUAL FUNCTION: NORMAL
CONCENTRATION: WNL
INSIGHT: INTACT
ORIENTATION: FULLY ORIENTED
ATTENTION: WNL
DELUSIONS: NONE OR AGE APPROPRIATE
RECENT MEMORY: WNL
PSYCHOMOTOR FUNCTIONING: WNL
EST. PREMORBID INTELLIGENCE: AVERAGE
SLEEP_WAKE_CYCLE: NO CHANGE
APPETITE: NO CHANGE
LIBIDO: NON-CONTRIBUTORY
AFFECT: FULL RANGE
MOOD: EUTHYMIC (NORMAL);HOPEFUL
EYE_CONTACT: WNL
REMOTE MEMORY: WNL
IMPULSE CONTROL: INTACT
THOUGHT_PROCESS: WNL
AROUSAL LEVEL: ALERT

## 2020-09-29 NOTE — PROGRESS NOTES
Request for Consent  Patient provided verbal consent to treat via telemedicine. Clinician introduced the secure telemedicine platform that we are utilizing to provide care during the COVID-19 pandemic. Patient understands the session will be billed to their insurance or patient directly.  Patient was informed only the patient and the clinician are permitted on?the video conference, sessions are not recorded by the clinician, and the patient is not permitted to record the session.? Patient was provided clinician's unique meeting ID prior to session, patient was asked to arrive to virtual session on time just as patient would if we were in the office. Clinician confirmed identification of patient by name and birthdate, provider name, location of patient and clinician, and callback number in case disconnected. Patient consents to behavioral health treatment    Patient Response to Request for Consent: Yes  Visit Type performed: Audio and Video Sofia Valente is a 45 y.o. female who presents for Follow-up.     Presenting Problem:  Pt reports recent reaction to flue shot, and ongoing pain in back.  She had to cancel recent scheduled appointment with physiatrist due to fever but has rescheduled.  She continues to recognized positive effects from medication.       Mental Status Exam:  Arousal Level: Alert  Appearance: Well Groomed  Speech: Regular  Psychomotor Functioning: WNL  Eye Contact: WNL  Est. Premorbid Intelligence: Average  Orientation: Fully oriented  Attention: WNL  Concentration: WNL  Recent Memory: WNL  Remote Memory: WNL  Thought Content: Appropriate  Thought Process: WNL  Insight: Intact  Perceptual Function: Normal  Delusions: None or age appropriate  Sleeping: No Change  Appetite: No Change  Libido: Non-Contributory  Affect: Full Range  Mood: Euthymic (normal), Hopeful     [place] Suicide Severity Rating Scale  Not done today       Assessment:   Pt reports continued positive effects from  medication and appreciation in working with Dr. Todd, who she feels listens and understands the complexities of her constellation of medical issues.  Pt is managing to keep even mood and is not as reactive to conflicts with , and able to modify expectations in order to maintain some peace around the house.    Pt is leaving to go see parents in [facility] for the next few weeks, which she is looking forward to. She states that the tenets she was raised with still hold true with her original family:   Never to to bed mad, and there is nothing you can't tell us, we will be supportive.  Pt wishes she could duplicate these values within marriage but does not feel that is possible.      Pt continues to socially distance and is taking precautions due to COVID, and expresses concerns and mindfulness about her two care takers doing the same.  Mood is stable, 'I'm not crying all the time like I did prior to medication', and is not reporting debilitating depressive symptoms at this time.  Next two sessions will be canceled due to visit with parents, but will resume after that.  Pt continues to organize and create modifications in the home to accommodate her physical limitations (widening doors and closets to accommodate her wheelchair and have shelving that is accessible).    Psychological condition is generally: improving  Plan: Patient to F/U with Weekly psychotherapy for 45 minutes each session.    Visit Diagnosis:    ICD-10-CM ICD-9-CM   1. Major depressive disorder, recurrent episode, moderate (CMS/HCC)  F33.1 296.32        Time  Start Time: 0830  End Time: 0915  Total Time: 45  Dorene England LCSW @ 9:37 AM

## 2020-10-13 ENCOUNTER — TELEMEDICINE (OUTPATIENT)
Dept: PSYCHIATRY | Facility: HOSPITAL | Age: 45
End: 2020-10-13
Attending: PSYCHIATRY & NEUROLOGY
Payer: COMMERCIAL

## 2020-10-13 DIAGNOSIS — F33.1 MAJOR DEPRESSIVE DISORDER, RECURRENT EPISODE, MODERATE (CMS/HCC): Primary | ICD-10-CM

## 2020-10-13 DIAGNOSIS — F41.1 GAD (GENERALIZED ANXIETY DISORDER): ICD-10-CM

## 2020-10-13 PROCEDURE — 99213 OFFICE O/P EST LOW 20 MIN: CPT | Mod: 95 | Performed by: PSYCHIATRY & NEUROLOGY

## 2020-10-13 RX ORDER — CITALOPRAM 10 MG/1
5 TABLET ORAL DAILY
Qty: 15 TABLET | Refills: 0 | Status: SHIPPED | OUTPATIENT
Start: 2020-10-13 | End: 2020-11-10 | Stop reason: SDUPTHER

## 2020-10-13 ASSESSMENT — ENCOUNTER SYMPTOMS
HEADACHES: 0
FATIGUE: 1
COUGH: 0
DIARRHEA: 0
FEVER: 0
NAUSEA: 0
SHORTNESS OF BREATH: 0
CONSTIPATION: 0

## 2020-10-13 NOTE — PROGRESS NOTES
Sofia Valente is a 45 y.o. female who presents for Follow-up and Med Management.    Telemedicine Service  This visit occurred via telemedicine services with the consent of the patient.  Patient location: [place]  Provider location: [facility] HARSHA [facility]  Those who participated in the encounter: Patient, Provider  Able to reach patient at : [phone]     Identified patient by name and birthdate, introduced myself and location, confirmed patient's location and private setting.  The details regarding blue jeans platform including letting patient know the video conference platform is private confidential secure and real-time.  The conversation is not being recorded.  No other participants in the video conference beyond noted above.  Informed that a summary of our appointment would be entered into the medical record and [facility] [facility] with bill for the session through telemedicine billing codes.  Patient informed of right to choose form of delivery service, including right to refuse video session.  Patient consents to behavioral health treatment      Celexa 5 mg has been helpful  Mild dizziness / fatigue    Less tearful  Less anxious    Mild  - moderate motivation    Worry is less intense    Weekly with Dorene, going well.    Sleep - OK with zolpidem, 7 hours  Energy - fair  Appetite - improving    Dr Mckenna for pain management, referred by orthopedic surgeon.  Dr Hang MCPHERSON MD, has follow up  Will see OB/GYN  New PCP, Dr Frey           Psychiatric ROS:   Sleep:Fair  Appetite: Fair  Libido: Fair  Exercise: 1-2 days per week  ETOH/Substances:     Medical Review of Systems  Review of Systems   Constitutional: Positive for fatigue. Negative for fever.   Respiratory: Negative for cough and shortness of breath.    Gastrointestinal: Negative for constipation, diarrhea and nausea.   Neurological: Negative for headaches.       PMSH: No changes were made to non-psychiatric medications/allergies/medical history.      Risk/Benefit/side Effects discussed regarding the following medications:  Reviewed risks of SSRI  PDMP Queried: Yes    Allergies:   Allergies   Allergen Reactions   • Gluten    • Morphine    • Penicillins        Current Outpatient Medications:   •  citalopram (CeleXA) 10 mg tablet, Take 0.5 tablets (5 mg total) by mouth daily., , Disp: 15 tablet, Rfl: 1  •  naproxen (NAPROSYN) 375 mg tablet, Take 1 tablet (375 mg total) by mouth 2 (two) times a day with meals., , Disp: 180 tablet, Rfl: 1  •  oxybutynin (DITROPAN) 5 mg tablet, Take 1 tablet (5 mg total) by mouth 3 (three) times a day., , Disp: 270 tablet, Rfl: 1  •  zolpidem (AMBIEN) 5 mg tablet, Take 1 tablet (5 mg total) by mouth nightly., , Disp: 30 tablet, Rfl: 1    Objective     Physical Exam    There were no vitals taken for this visit.    MENTAL STATUS EXAM  Appearance: well groomed and appropriate attire  Gait and Motor: rigidity and slow  Speech: fluent  Mood: better and 'okay'  Affect: constricted and anxious  Associations: coherent  Thought Process: goal-directed and slowed  Thought Content: no auditory or visual hallucinations. and appropriate to situation  Suicidality/Homicidality: denies  Judgement/Insight: good  Orientation: day, month and year  Memory: recalls recent events and recalls remote events  Attention: alert  Knowledge: normal  Language: normal           Brief Psychiatric Formulation:   Patient is a 45-year-old  G0, P0 female with history of FSHD (facioscapulohumeral muscular dystrophy), celiac disease,Crohn's disease, arthritis, chronic pain status post bone stimulator removal with mood and anxiety symptoms consistent with MDD recurrent moderate and PARISA. No dangerousness to self or others.  Strengths include intelligence, good family support.    Patient seen for PEV 9/9/2020, trial of Prozac recommended, unfortunately patient with significant activation and inability to tolerate after only several doses, prozac discontinued.   Trial of citalopram initiated by phone mid September.  Patient has tolerated and benefited from same.  Continues to utilize zolpidem prescribed by PCP.  Interval history significant for normal thyroid, low vitamin D.  Patient engaged in individual psychotherapy with benefit as well.  No dangerousness to self or others.    Plan:  Continue citalopram 5 mg p.o. daily, low threshold for further titration  Continue zolpidem 5 mg p.o. nightly, prescribed by PCP  Vitamin D replacement, patient will follow-up with PCP for ongoing management  Continue individual therapy with Dorene England LCSW as scheduled  Follow-up med check 4 to 6 weeks        Visit Diagnosis:    ICD-10-CM ICD-9-CM   1. Major depressive disorder, recurrent episode, moderate (CMS/HCC)  F33.1 296.32   2. PARISA (generalized anxiety disorder)  F41.1 300.02       Duration:  20 minutes  Devora Todd MD @ 12:20 PM

## 2020-10-14 ENCOUNTER — TELEPHONE (OUTPATIENT)
Dept: PSYCHIATRY | Facility: HOSPITAL | Age: 45
End: 2020-10-14

## 2020-10-20 ENCOUNTER — TELEMEDICINE (OUTPATIENT)
Dept: PSYCHIATRY | Facility: HOSPITAL | Age: 45
End: 2020-10-20
Attending: SOCIAL WORKER
Payer: COMMERCIAL

## 2020-10-20 DIAGNOSIS — F33.1 MAJOR DEPRESSIVE DISORDER, RECURRENT EPISODE, MODERATE (CMS/HCC): Primary | ICD-10-CM

## 2020-10-20 PROCEDURE — 90834 PSYTX W PT 45 MINUTES: CPT | Mod: 95 | Performed by: SOCIAL WORKER

## 2020-10-20 ASSESSMENT — COGNITIVE AND FUNCTIONAL STATUS - GENERAL
LIBIDO: NON-CONTRIBUTORY
AROUSAL LEVEL: ALERT
RECENT MEMORY: WNL
ORIENTATION: FULLY ORIENTED
REMOTE MEMORY: WNL
SLEEP_WAKE_CYCLE: NO CHANGE
PERCEPTUAL FUNCTION: NORMAL
EST. PREMORBID INTELLIGENCE: AVERAGE
INSIGHT: INTACT
EYE_CONTACT: WNL
MOOD: EUTHYMIC (NORMAL);MOTIVATED;HOPEFUL
AFFECT: FULL RANGE
CONCENTRATION: WNL
IMPULSE CONTROL: INTACT
THOUGHT_CONTENT: APPROPRIATE
THOUGHT_PROCESS: WNL
APPETITE: INCREASED
SPEECH: REGULAR
PSYCHOMOTOR FUNCTIONING: WNL
DELUSIONS: NONE OR AGE APPROPRIATE
APPEARANCE: WELL GROOMED
ATTENTION: WNL

## 2020-10-20 NOTE — PROGRESS NOTES
Request for Consent  Patient provided verbal consent to treat via telemedicine. Clinician introduced the secure telemedicine platform that we are utilizing to provide care during the COVID-19 pandemic. Patient understands the session will be billed to their insurance or patient directly.  Patient was informed only the patient and the clinician are permitted on?the video conference, sessions are not recorded by the clinician, and the patient is not permitted to record the session.? Patient was provided clinician's unique meeting ID prior to session, patient was asked to arrive to virtual session on time just as patient would if we were in the office. Clinician confirmed identification of patient by name and birthdate, provider name, location of patient and clinician, and callback number in case disconnected. Patient consents to behavioral health treatment    Patient Response to Request for Consent: Yes  Visit Type performed: Audio and Video Sofia Valente is a 45 y.o. female who presents for Follow-up.     Presenting Problem:  Pt returned from parents home in [facility] after two weeks, feeling happy and refreshed, with new found confidence.      Mental Status Exam:  Arousal Level: Alert  Appearance: Well Groomed  Speech: Regular  Psychomotor Functioning: WNL  Eye Contact: WNL  Est. Premorbid Intelligence: Average  Orientation: Fully oriented  Attention: WNL  Concentration: WNL  Recent Memory: WNL  Remote Memory: WNL  Thought Content: Appropriate  Thought Process: WNL  Insight: Intact  Perceptual Function: Normal  Delusions: None or age appropriate  Sleeping: No Change  Appetite: Increased  Libido: Non-Contributory  Affect: Full Range  Mood: Euthymic (normal), Motivated, Hopeful     [place] Suicide Severity Rating Scale  Not done today       Assessment:   Pt reports having a wonderful  Nurtured time at parents home.  She spoke to  who did not go with her, while away and did not respond to negativity  during her stay.  Pt reports she now feels confident that whatever decision she can make regarding her marriage, she can handle 'and I know it might be hard, but I can manage it'.  REXW provided supportive listening and praised pt for her hard work and efforts to find the courage to work towards a happier life, reminding her she is not on a time frame, which was a relief for patient to hear.    Pt continues to be a super isolator and considers this with the caretakers she has.   She will keep her 'bubble' small, and will also isolate more when parents come for thanksgiving.    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  She will go to physiatrist appointment next week to explore options for her pain.    Psychological condition is generally: improving  Plan: Patient to F/U with Weekly psychotherapy for 45 minutes each session.    Visit Diagnosis:    ICD-10-CM ICD-9-CM   1. Major depressive disorder, recurrent episode, moderate (CMS/HCC)  F33.1 296.32        Time  Start Time: 1200  End Time: 1250  Total Time: 50  Dorene England LCSW @ 1:58 PM

## 2020-10-21 ENCOUNTER — TRANSCRIBE ORDERS (OUTPATIENT)
Dept: SCHEDULING | Facility: REHABILITATION | Age: 45
End: 2020-10-21

## 2020-10-21 DIAGNOSIS — G71.00 MUSCULAR DYSTROPHY, UNSPECIFIED (CMS/HCC): Primary | ICD-10-CM

## 2020-10-23 ENCOUNTER — TELEPHONE (OUTPATIENT)
Dept: PSYCHIATRY | Facility: HOSPITAL | Age: 45
End: 2020-10-23

## 2020-10-23 NOTE — TELEPHONE ENCOUNTER
Spoke with pt by phone. Pt reports her insurance will not cover the existing Dx code for Vitamin D. Pt will email RN paperwork of covered Dx codes and RN will review with provider.

## 2020-10-26 NOTE — TELEPHONE ENCOUNTER
Spoke with pt insurance co. ([facility], [facility]). There are no specific appeal forms required, just following info required to include in appeal letter:    Cover letter, supporting clinical record, Claim #, procedure code (26081), & claim charge amount (if available). RN contacted pt for claim #. Pt reports she viewed portion of claim not covered online, no official bill received yet. Pt was trying to be proactive. Pt aware of Dr. Todd's decision to appeal claim and pt will contact [facility] when she receives an official bill/claim #.     Appeals can be mailed to:  [facility]   [address]  [place]. [phone]

## 2020-10-27 ENCOUNTER — TRANSCRIBE ORDERS (OUTPATIENT)
Dept: SCHEDULING | Age: 45
End: 2020-10-27

## 2020-10-27 ENCOUNTER — TELEMEDICINE (OUTPATIENT)
Dept: PSYCHIATRY | Facility: HOSPITAL | Age: 45
End: 2020-10-27
Attending: SOCIAL WORKER
Payer: COMMERCIAL

## 2020-10-27 DIAGNOSIS — F33.1 MAJOR DEPRESSIVE DISORDER, RECURRENT EPISODE, MODERATE (CMS/HCC): Primary | ICD-10-CM

## 2020-10-27 DIAGNOSIS — M54.12 RADICULOPATHY, CERVICAL REGION: Primary | ICD-10-CM

## 2020-10-27 PROCEDURE — 90834 PSYTX W PT 45 MINUTES: CPT | Mod: 95 | Performed by: SOCIAL WORKER

## 2020-10-27 ASSESSMENT — COGNITIVE AND FUNCTIONAL STATUS - GENERAL
THOUGHT_CONTENT: APPROPRIATE
SPEECH: REGULAR
LIBIDO: NON-CONTRIBUTORY
MOOD: EUTHYMIC (NORMAL);MOTIVATED;HOPEFUL
PSYCHOMOTOR FUNCTIONING: WNL
SLEEP_WAKE_CYCLE: NO CHANGE
RECENT MEMORY: WNL
DELUSIONS: NONE OR AGE APPROPRIATE
APPEARANCE: WELL GROOMED
PERCEPTUAL FUNCTION: NORMAL
APPETITE: NO CHANGE
IMPULSE CONTROL: INTACT
CONCENTRATION: WNL
EYE_CONTACT: WNL
ATTENTION: WNL
INSIGHT: INTACT
THOUGHT_PROCESS: WNL
AROUSAL LEVEL: ALERT
EST. PREMORBID INTELLIGENCE: AVERAGE
REMOTE MEMORY: WNL
AFFECT: FULL RANGE
ORIENTATION: FULLY ORIENTED

## 2020-10-27 NOTE — PROGRESS NOTES
Request for Consent  Patient provided verbal consent to treat via telemedicine. Clinician introduced the secure telemedicine platform that we are utilizing to provide care during the COVID-19 pandemic. Patient understands the session will be billed to their insurance or patient directly.  Patient was informed only the patient and the clinician are permitted on?the video conference, sessions are not recorded by the clinician, and the patient is not permitted to record the session.? Patient was provided clinician's unique meeting ID prior to session, patient was asked to arrive to virtual session on time just as patient would if we were in the office. Clinician confirmed identification of patient by name and birthdate, provider name, location of patient and clinician, and callback number in case disconnected. Patient consents to behavioral health treatment    Patient Response to Request for Consent: Yes  Visit Type performed: Audio and Video Sofia Valente is a 45 y.o. female who presents for Follow-up.     Presenting Problem:  Pt reports feeling better then she has in the past emotionally and mentally, attributing this in part to medication efficacy.        Mental Status Exam:  Arousal Level: Alert  Appearance: Well Groomed  Speech: Regular  Psychomotor Functioning: WNL  Eye Contact: WNL  Est. Premorbid Intelligence: Average  Orientation: Fully oriented  Attention: WNL  Concentration: WNL  Recent Memory: WNL  Remote Memory: WNL  Thought Content: Appropriate  Thought Process: WNL  Insight: Intact  Perceptual Function: Normal  Delusions: None or age appropriate  Sleeping: No Change  Appetite: No Change  Libido: Non-Contributory  Affect: Full Range  Mood: Euthymic (normal), Motivated, Hopeful     [place] Suicide Severity Rating Scale  Not done today       Assessment:   Pt reports feeling well emotionally, not depressed and 'I haven't cried in weeks', as a benchmark for her mental stability.  She attributes  this in part to medication, ongoing support from her family, and excellent care from her team of physicians and caretaker.    Pt saw phsysiotrist recently (Dr. Adam) who provided excellent comprehensive care and addressed many of the pain management issues that are chronic and ongoing.  She will be scheduled for an MRI and feels she is getting the best care possible from him. She has also followed up with gastroenterologist (today), gynecologist (last week), and was supported by the aid of her main caregiver who has 'been a godsend'.  This particular caregiver is a  and can also 'stretch' pt, providing physical relief.    Pt reflected on marriage, and is now able to approach her thoughts without fear, in a more rational manner then she had been before.  She recognizes that she is not happy and is evaluating what her needs may be moving forward, both staying in marriage or moving away from it. Pt is able to express her feelings without fear of judgement, and is doing well in evaluating what may be best for her future.    Parents coming for visit for thanksgiving and will stay with sister, while pt and her  provide thanksgiving dinner and setting up Juliette decorations afterwards. Pt in good spirits, despite chronic pain.  She plans on setting up an appointment with the 'seating clinic' to improve wheelchair comfort in the near future.  Pt reports depressive symptoms decreased significantly.    Psychological condition is generally: improving  Plan: Patient to F/U with Weekly psychotherapy for 45 minutes each session.    Visit Diagnosis:    ICD-10-CM ICD-9-CM   1. Major depressive disorder, recurrent episode, moderate (CMS/HCC)  F33.1 296.32        Time  Start Time: 0930  End Time: 1011  Total Time: 41  Dorene England LCSW @ 10:21 AM

## 2020-10-29 RX ORDER — TRAMADOL HYDROCHLORIDE 50 MG/1
50 TABLET ORAL 2 TIMES DAILY PRN
Qty: 60 TABLET | Refills: 0 | Status: SHIPPED | OUTPATIENT
Start: 2020-10-29 | End: 2020-10-30 | Stop reason: SDUPTHER

## 2020-10-29 RX ORDER — TRAMADOL HYDROCHLORIDE 50 MG/1
50 TABLET ORAL 2 TIMES DAILY PRN
COMMUNITY
End: 2020-10-29 | Stop reason: SDUPTHER

## 2020-11-03 ENCOUNTER — TELEMEDICINE (OUTPATIENT)
Dept: PSYCHIATRY | Facility: HOSPITAL | Age: 45
End: 2020-11-03
Attending: SOCIAL WORKER
Payer: COMMERCIAL

## 2020-11-03 ENCOUNTER — TELEPHONE (OUTPATIENT)
Dept: PSYCHIATRY | Facility: HOSPITAL | Age: 45
End: 2020-11-03

## 2020-11-03 DIAGNOSIS — F33.1 MAJOR DEPRESSIVE DISORDER, RECURRENT EPISODE, MODERATE (CMS/HCC): Primary | ICD-10-CM

## 2020-11-03 PROCEDURE — 90834 PSYTX W PT 45 MINUTES: CPT | Mod: 95 | Performed by: SOCIAL WORKER

## 2020-11-03 ASSESSMENT — COGNITIVE AND FUNCTIONAL STATUS - GENERAL
PSYCHOMOTOR FUNCTIONING: WNL
LIBIDO: NON-CONTRIBUTORY
INSIGHT: INTACT
REMOTE MEMORY: WNL
APPEARANCE: WELL GROOMED
SLEEP_WAKE_CYCLE: NO CHANGE
RECENT MEMORY: WNL
AROUSAL LEVEL: ALERT
APPETITE: NO CHANGE
EST. PREMORBID INTELLIGENCE: AVERAGE
PERCEPTUAL FUNCTION: NORMAL
EYE_CONTACT: WNL
DELUSIONS: NONE OR AGE APPROPRIATE
SPEECH: REGULAR
CONCENTRATION: WNL
THOUGHT_CONTENT: APPROPRIATE
ORIENTATION: FULLY ORIENTED
MOOD: EUTHYMIC (NORMAL);HOPEFUL;MOTIVATED
IMPULSE CONTROL: INTACT

## 2020-11-03 NOTE — PROGRESS NOTES
Request for Consent  Patient provided verbal consent to treat via telemedicine. Clinician introduced the secure telemedicine platform that we are utilizing to provide care during the COVID-19 pandemic. Patient understands the session will be billed to their insurance or patient directly.  Patient was informed only the patient and the clinician are permitted on?the video conference, sessions are not recorded by the clinician, and the patient is not permitted to record the session.? Patient was provided clinician's unique meeting ID prior to session, patient was asked to arrive to virtual session on time just as patient would if we were in the office. Clinician confirmed identification of patient by name and birthdate, provider name, location of patient and clinician, and callback number in case disconnected. Patient consents to behavioral health treatment    Patient Response to Request for Consent: Yes  Visit Type performed: Audio and Video Sofia Valente is a 45 y.o. female who presents for Follow-up.     Presenting Problem:  Pt reports excitement at parents upcoming visit in two weeks, and brannonrc begin isolating more as that time approaches. She is recognizing how she may want to envision her future, apart from , with a growing sense of confidence, absent of fear she had for so long.      Mental Status Exam:  Arousal Level: Alert  Appearance: Well Groomed  Speech: Regular  Psychomotor Functioning: WNL  Eye Contact: WNL  Est. Premorbid Intelligence: Average  Orientation: Fully oriented  Concentration: WNL  Recent Memory: WNL  Remote Memory: WNL  Thought Content: Appropriate  Insight: Intact  Perceptual Function: Normal  Delusions: None or age appropriate  Sleeping: No Change  Appetite: No Change  Libido: Non-Contributory  Mood: Euthymic (normal), Hopeful, Motivated     [place] Suicide Severity Rating Scale  Not done today       Assessment:   Pt reports an ongoing sense of confidence as she  recognizes how opressive her marriage has felt to her.  She has been working on visualizing what can be, absent of the fear and depressive thoughts she's had in the past.  Pt continues to connect with local friends who have encouraged her to enjoy her life and this gives her a sense of community.      Pt is pondering her anticipated winter isolation, and as she states  ''being stuck in the house with ()'.  She is recognizing how little they have in common and how he may not have to capacity for a loving relationship.  She remains less tearful when thinking about being on her own, she states 'I am attached to this house' but cannot identify what she would miss about  should she decide to separate or divorce.  REXW provided supportive listening and encouraged pt to take her time to make decisions, she has no pressure or time limits in which she needs to feel a decision has to be made, which pt agreed with.       Pt continues to work with pain doctor as well as gastroenterologist for upcoming appointments and treatment of long standing health issues.      Psychological condition is generally: improving  Plan: Patient to F/U with Weekly psychotherapy for 45 minutes each session.    Visit Diagnosis:    ICD-10-CM ICD-9-CM   1. Major depressive disorder, recurrent episode, moderate (CMS/HCC)  F33.1 296.32        Time  Start Time: 1200  End Time: 1250  Total Time: 50  Dorene England LCSW @ 1:21 PM

## 2020-11-03 NOTE — TELEPHONE ENCOUNTER
Spoke with pt by phone regarding message left on voicemail. Pt has not received an official bill yet, but will email explanation of benefits & copy of appeal process. Pt aware insurance requires a claim # to appeal, but RN will look over paperwork that pt emails.

## 2020-11-04 ENCOUNTER — HOSPITAL ENCOUNTER (OUTPATIENT)
Dept: RADIOLOGY | Facility: HOSPITAL | Age: 45
Discharge: HOME | End: 2020-11-04
Attending: PHYSICAL MEDICINE & REHABILITATION
Payer: COMMERCIAL

## 2020-11-04 DIAGNOSIS — M54.12 RADICULOPATHY, CERVICAL REGION: ICD-10-CM

## 2020-11-10 ENCOUNTER — TELEMEDICINE (OUTPATIENT)
Dept: PSYCHIATRY | Facility: HOSPITAL | Age: 45
End: 2020-11-10
Attending: SOCIAL WORKER
Payer: COMMERCIAL

## 2020-11-10 DIAGNOSIS — F33.1 MAJOR DEPRESSIVE DISORDER, RECURRENT EPISODE, MODERATE (CMS/HCC): Primary | ICD-10-CM

## 2020-11-10 PROCEDURE — 90837 PSYTX W PT 60 MINUTES: CPT | Mod: 95 | Performed by: SOCIAL WORKER

## 2020-11-10 RX ORDER — CITALOPRAM 10 MG/1
5 TABLET ORAL DAILY
Qty: 7 TABLET | Refills: 0 | Status: SHIPPED | OUTPATIENT
Start: 2020-11-10 | End: 2020-12-08 | Stop reason: SDUPTHER

## 2020-11-10 RX ORDER — CITALOPRAM 10 MG/1
5 TABLET ORAL DAILY
Qty: 45 TABLET | Refills: 0 | Status: SHIPPED | OUTPATIENT
Start: 2020-11-10 | End: 2020-11-10 | Stop reason: SDUPTHER

## 2020-11-10 ASSESSMENT — COGNITIVE AND FUNCTIONAL STATUS - GENERAL
DELUSIONS: NONE OR AGE APPROPRIATE
ORIENTATION: FULLY ORIENTED
THOUGHT_PROCESS: WNL
CONCENTRATION: WNL
AROUSAL LEVEL: ALERT
IMPULSE CONTROL: INTACT
SPEECH: REGULAR
MOOD: EUTHYMIC (NORMAL)
EYE_CONTACT: WNL
LIBIDO: NON-CONTRIBUTORY
THOUGHT_CONTENT: APPROPRIATE
SLEEP_WAKE_CYCLE: NO CHANGE
ATTENTION: WNL
PSYCHOMOTOR FUNCTIONING: WNL
APPETITE: NO CHANGE
REMOTE MEMORY: WNL
PERCEPTUAL FUNCTION: NORMAL
INSIGHT: INTACT
AFFECT: FULL RANGE;TEARFUL
EST. PREMORBID INTELLIGENCE: AVERAGE
RECENT MEMORY: WNL
APPEARANCE: WELL GROOMED

## 2020-11-10 NOTE — TELEPHONE ENCOUNTER
Medicine Refill Request    Last Telemedicine Visit: 10/13/2020, Dr. Todd    Next Telemedicine Visit: 12/1/2020, Dr. Todd      Current Outpatient Medications:   •  citalopram (CeleXA) 10 mg tablet, Take 0.5 tablets (5 mg total) by mouth daily., Disp: 15 tablet, Rfl: 0  •  naproxen (NAPROSYN) 375 mg tablet, Take 1 tablet (375 mg total) by mouth 2 (two) times a day with meals., Disp: 180 tablet, Rfl: 1  •  oxybutynin (DITROPAN) 5 mg tablet, Take 1 tablet (5 mg total) by mouth 3 (three) times a day., Disp: 270 tablet, Rfl: 1  •  traMADoL (ULTRAM) 50 mg tablet, Take 1 tablet (50 mg total) by mouth 2 (two) times a day as needed for moderate pain., Disp: 60 tablet, Rfl: 3  •  zolpidem (AMBIEN) 5 mg tablet, Take 1 tablet (5 mg total) by mouth nightly., Disp: 30 tablet, Rfl: 1      BP Readings from Last 3 Encounters:   07/10/20 120/82   05/02/19 135/72       Recent Lab results:  No results found for: CHOL, No results found for: HDL, No results found for: LDLCALC, No results found for: TRIG     Lab Results   Component Value Date    GLUCOSE 99 07/10/2020   , No results found for: HGBA1C      Lab Results   Component Value Date    CREATININE 0.25 (L) 07/10/2020       Lab Results   Component Value Date    TSH 1.17 09/24/2020      Pt left message on medline requesting Celexa refill (90 day supply per insurance) to [facility]. Pt will run out of medication this week and will need a partial fill available @ [facility] in case.
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
## 2020-11-11 ENCOUNTER — TELEPHONE (OUTPATIENT)
Dept: PSYCHIATRY | Facility: HOSPITAL | Age: 45
End: 2020-11-11

## 2020-11-11 NOTE — TELEPHONE ENCOUNTER
Spoke with pt by phone to notify pt 90 day script for Celexa sent to [facility] as requested. Pt [facility] until pt mail order is processed.     Pt reports she was speaking with her insurance company and received good news that her wheelchair is covered by insurance. Pt proactively inquired about her Vitamin D level while speaking with insurance and received a claim #: [id number], for the bill she will receive of $141. Date of service: 9/24/2020. Pt aware Dr. Todd will attempt to appeal the claim. RN advised pt to wait until she has received an official bill which she can forward to [facility] so Dr. Todd can appeal. Pt has not received an official bill yet. Pt verbalized understanding.

## 2020-11-17 ENCOUNTER — OFFICE VISIT (OUTPATIENT)
Dept: PRIMARY CARE | Facility: CLINIC | Age: 45
End: 2020-11-17
Payer: COMMERCIAL

## 2020-11-17 VITALS
TEMPERATURE: 97.8 F | OXYGEN SATURATION: 98 % | HEART RATE: 83 BPM | WEIGHT: 130 LBS | DIASTOLIC BLOOD PRESSURE: 72 MMHG | BODY MASS INDEX: 20.89 KG/M2 | SYSTOLIC BLOOD PRESSURE: 120 MMHG | HEIGHT: 66 IN

## 2020-11-17 DIAGNOSIS — G47.00 INSOMNIA, UNSPECIFIED TYPE: ICD-10-CM

## 2020-11-17 DIAGNOSIS — G71.02 FSHD (FACIOSCAPULOHUMERAL MUSCULAR DYSTROPHY) (CMS/HCC): Primary | ICD-10-CM

## 2020-11-17 DIAGNOSIS — K50.919 CROHN'S DISEASE WITH COMPLICATION, UNSPECIFIED GASTROINTESTINAL TRACT LOCATION (CMS/HCC): ICD-10-CM

## 2020-11-17 DIAGNOSIS — K90.0 CELIAC DISEASE: ICD-10-CM

## 2020-11-17 DIAGNOSIS — F41.9 ANXIETY: ICD-10-CM

## 2020-11-17 DIAGNOSIS — G82.52 QUADRIPLEGIA, C1-C4 INCOMPLETE (CMS/HCC): ICD-10-CM

## 2020-11-17 DIAGNOSIS — F32.A DEPRESSION, UNSPECIFIED DEPRESSION TYPE: ICD-10-CM

## 2020-11-17 DIAGNOSIS — B35.1 ONYCHOMYCOSIS OF RIGHT GREAT TOE: ICD-10-CM

## 2020-11-17 DIAGNOSIS — E55.9 VITAMIN D DEFICIENCY, UNSPECIFIED: ICD-10-CM

## 2020-11-17 PROCEDURE — 99213 OFFICE O/P EST LOW 20 MIN: CPT | Performed by: NURSE PRACTITIONER

## 2020-11-17 RX ORDER — VIT C/E/ZN/COPPR/LUTEIN/ZEAXAN 250MG-90MG
5000 CAPSULE ORAL DAILY
COMMUNITY

## 2020-11-17 RX ORDER — LIDOCAINE 50 MG/G
PATCH TOPICAL
COMMUNITY
End: 2022-10-03 | Stop reason: ALTCHOICE

## 2020-11-17 ASSESSMENT — ENCOUNTER SYMPTOMS
ABDOMINAL PAIN: 0
COUGH: 0
FATIGUE: 0
RHINORRHEA: 0
PSYCHIATRIC NEGATIVE: 1
NAUSEA: 0
ARTHRALGIAS: 0
VOMITING: 0
MYALGIAS: 0
WHEEZING: 0
NEUROLOGICAL NEGATIVE: 1
ALLERGIC/IMMUNOLOGIC NEGATIVE: 1
DIAPHORESIS: 0
SHORTNESS OF BREATH: 0
CHILLS: 0
DIARRHEA: 0
FEVER: 0
HEMATOLOGIC/LYMPHATIC NEGATIVE: 1

## 2020-11-17 NOTE — PROGRESS NOTES
Subjective      Patient ID: Sofia Valente is a 45 y.o. female.  1975      New patient presents to establish care.  Previous pt of Dr. Choudhury- last saw him in July.    Has FSHD and quadriplegia; does not see neurology.  Uses power wheelchair and has EZ Stand at home- does weight bearing throughout the day.    Has Crohn's and Celiac disease- sees GI Dr. Mauricio.  Recent vit D low at 19- was recommended to start OTC vit D.   Has f/u labs and appt in Jan.    Has OAB; on oxybutynin.    Currently seeing ortho and physiatry Dr. Guzman for cerviothoracic spine disease and pain management.  Has lidocaine patches and tramadol PRN.    Sees [facility] here in [place] for depression and anxiety; on citalopram.    Has insomnia- takes ambien PRN.     Sees GYN Dr. Mcdonald  Had normal mammo last Nov; has script for this yr but hasn't scheduled yet.  Last Pap was done a few weeks ago; normal per pt    Sees eye dr and dentist regularly.    Immunizations: Received flu shot. Thinks she had tdap about 5 yrs ago.    Had a physical through her 's work a few weeks ago and had labs done at that time.  Is not sure if that was billed as a preventive visit to her insurance or not.      The following have been reviewed and updated as appropriate in this visit:  Tobacco  Allergies  Meds  Problems  Med Hx  Surg Hx  Fam Hx  Soc Hx        Review of Systems   Constitutional: Negative for chills, diaphoresis, fatigue and fever.   HENT: Negative for congestion, ear pain, rhinorrhea and sneezing.    Eyes: Negative for visual disturbance.   Respiratory: Negative for cough, shortness of breath and wheezing.    Cardiovascular: Negative for chest pain.   Gastrointestinal: Negative for abdominal pain, diarrhea, nausea and vomiting.   Endocrine: Negative for cold intolerance and heat intolerance.   Genitourinary: Negative.    Musculoskeletal: Negative for arthralgias and myalgias.   Skin: Negative for rash.  "  Allergic/Immunologic: Negative.    Neurological: Negative.    Hematological: Negative.    Psychiatric/Behavioral: Negative.        Objective     Vitals:    11/17/20 1304   BP: 120/72   BP Location: Left upper arm   Patient Position: Sitting   Pulse: 83   Temp: 36.6 °C (97.8 °F)   TempSrc: Oral   SpO2: 98%   Weight: 59 kg (130 lb)   Height: 1.676 m (5' 6\")     Body mass index is 20.98 kg/m².    Physical Exam  Constitutional:       General: She is not in acute distress.     Appearance: She is well-developed.   HENT:      Head: Normocephalic.      Right Ear: Hearing, tympanic membrane, ear canal and external ear normal.      Left Ear: Hearing, tympanic membrane, ear canal and external ear normal.      Nose: Nose normal.   Eyes:      General: Lids are normal.      Conjunctiva/sclera: Conjunctivae normal.      Pupils: Pupils are equal, round, and reactive to light.   Neck:      Musculoskeletal: Normal range of motion.      Thyroid: No thyromegaly.   Cardiovascular:      Rate and Rhythm: Normal rate and regular rhythm.      Pulses:           Radial pulses are 2+ on the right side and 2+ on the left side.        Posterior tibial pulses are 2+ on the right side and 2+ on the left side.      Heart sounds: S1 normal and S2 normal. No murmur.   Pulmonary:      Effort: Pulmonary effort is normal.      Breath sounds: Normal breath sounds.   Abdominal:      General: Bowel sounds are normal.      Palpations: Abdomen is soft. There is no mass.      Tenderness: There is no abdominal tenderness.   Musculoskeletal: Normal range of motion.      Comments: Decreased ROM.   Feet:      Right foot:      Toenail Condition: Fungal disease present.  Lymphadenopathy:      Cervical: No cervical adenopathy.   Skin:     General: Skin is warm and dry.   Neurological:      Mental Status: She is oriented to person, place, and time.      Sensory: No sensory deficit.      Motor: Weakness present.      Gait: Gait abnormal.      Deep Tendon Reflexes: "      Reflex Scores:       Patellar reflexes are 2+ on the right side and 2+ on the left side.     Comments: Uses power wheelchair.   Psychiatric:         Speech: Speech normal.         Behavior: Behavior normal.         Thought Content: Thought content normal.         Assessment/Plan   Diagnoses and all orders for this visit:    FSHD (facioscapulohumeral muscular dystrophy) (CMS/[facility]) (Primary)    Quadriplegia, C1-C4 incomplete (CMS/HCC)    Crohn's disease with complication, unspecified gastrointestinal tract location (CMS/[facility])    Celiac disease    Insomnia, unspecified type    Depression, unspecified depression type    Anxiety    Vitamin D deficiency, unspecified    Onychomycosis of right great toe  -     efinaconazole 10 % solution with applicator; Apply topically daily. For 48 weeks.      Will hold of on ordering labs at this time.  Pt will print out recent wellness lab results and bring to next appt.    Crohn's and celiac disease managed by GI.  Depression and anxiety managed by behavioral health.    Pt currently on OTC vitamin D for now.  Discussed starting prescription-strength if repeat level is still low.

## 2020-11-17 NOTE — PATIENT INSTRUCTIONS
Please bring print out of recent wellness visit labs to next visit so we can scan into your chart.  We can order additional labs at that time if necessary.    As discussed, we can send in a script for prescription-strength vitamin D if your repeat level is still low.    Continue to follow up with specialists as directed.

## 2020-11-30 ENCOUNTER — TELEPHONE (OUTPATIENT)
Dept: PRIMARY CARE | Facility: CLINIC | Age: 45
End: 2020-11-30

## 2020-11-30 DIAGNOSIS — B35.1 ONYCHOMYCOSIS OF TOENAIL: Primary | ICD-10-CM

## 2020-11-30 RX ORDER — TAVABOROLE TOPICAL SOLUTION, 5% 43.5 MG/ML
SOLUTION TOPICAL DAILY
Qty: 4 ML | Refills: 1 | Status: SHIPPED | OUTPATIENT
Start: 2020-11-30 | End: 2021-11-01

## 2020-11-30 RX ORDER — ZOLPIDEM TARTRATE 5 MG/1
5 TABLET ORAL NIGHTLY
Qty: 30 TABLET | Refills: 1 | Status: SHIPPED | OUTPATIENT
Start: 2020-11-30 | End: 2021-01-28

## 2020-11-30 RX ORDER — CICLOPIROX 80 MG/ML
SOLUTION TOPICAL NIGHTLY
Qty: 6.6 ML | Refills: 1 | Status: SHIPPED | OUTPATIENT
Start: 2020-11-30 | End: 2021-02-28

## 2020-12-01 ENCOUNTER — TELEMEDICINE (OUTPATIENT)
Dept: PSYCHIATRY | Facility: HOSPITAL | Age: 45
End: 2020-12-01
Attending: SOCIAL WORKER
Payer: COMMERCIAL

## 2020-12-01 DIAGNOSIS — F33.1 MAJOR DEPRESSIVE DISORDER, RECURRENT EPISODE, MODERATE (CMS/HCC): Primary | ICD-10-CM

## 2020-12-01 PROCEDURE — 90834 PSYTX W PT 45 MINUTES: CPT | Mod: 95 | Performed by: SOCIAL WORKER

## 2020-12-01 ASSESSMENT — COGNITIVE AND FUNCTIONAL STATUS - GENERAL
EST. PREMORBID INTELLIGENCE: AVERAGE
PSYCHOMOTOR FUNCTIONING: WNL
SLEEP_WAKE_CYCLE: NO CHANGE
APPEARANCE: WELL GROOMED
THOUGHT_PROCESS: WNL
IMPULSE CONTROL: INTACT
APPETITE: NO CHANGE
AROUSAL LEVEL: ALERT
EYE_CONTACT: WNL
DELUSIONS: NONE OR AGE APPROPRIATE
INSIGHT: INTACT
PERCEPTUAL FUNCTION: NORMAL
SPEECH: REGULAR
AFFECT: FULL RANGE
ATTENTION: WNL
CONCENTRATION: WNL
LIBIDO: NON-CONTRIBUTORY
RECENT MEMORY: WNL
THOUGHT_CONTENT: APPROPRIATE
MOOD: EUTHYMIC (NORMAL);HOPEFUL;MOTIVATED
REMOTE MEMORY: WNL

## 2020-12-01 NOTE — PROGRESS NOTES
Request for Consent  Patient provided verbal consent to treat via telemedicine. Clinician introduced the secure telemedicine platform that we are utilizing to provide care during the COVID-19 pandemic. Patient understands the session will be billed to their insurance or patient directly.  Patient was informed only the patient and the clinician are permitted on?the video conference, sessions are not recorded by the clinician, and the patient is not permitted to record the session.? Patient was provided clinician's unique meeting ID prior to session, patient was asked to arrive to virtual session on time just as patient would if we were in the office. Clinician confirmed identification of patient by name and birthdate, provider name, location of patient and clinician, and callback number in case disconnected. Patient consents to behavioral health treatment    Patient Response to Request for Consent: Yes  Visit Type performed: Audio and Video Sofia Valente is a 45 y.o. female who presents for Follow-up.     Presenting Problem:  Pt reports having a wonderful thanksgiving holiday with parents and family, drama free.  She has been attending a variety of doctor appointments, as was a goal, and continues to seek solutions for her pain management.       Mental Status Exam:  Arousal Level: Alert  Appearance: Well Groomed  Speech: Regular  Psychomotor Functioning: WNL  Eye Contact: WNL  Est. Premorbid Intelligence: Average  Attention: WNL  Concentration: WNL  Recent Memory: WNL  Remote Memory: WNL  Thought Content: Appropriate  Thought Process: WNL  Insight: Intact  Perceptual Function: Normal  Delusions: None or age appropriate  Sleeping: No Change  Appetite: No Change  Libido: Non-Contributory  Affect: Full Range  Mood: Euthymic (normal), Hopeful, Motivated  PHQ-9: Brief Depression Severity Measure Score: 5    [place] Suicide Severity Rating Scale  Not done today       Assessment:   Pt reports having a wonderful  thanksgiving holiday with parents and family, drama free.  She has been attending a variety of doctor appointments, as was a goal, and continues to seek solutions for her pain management. Main source of pain continues to eminate from overuse of muscles that are compensating for decreased function due to MD.    Pt has been exploring housing options for future should she elect to separate from , gathering data but not committing to staying or leaving at this time.  She reports the work she has done in therapy has helped her to manage her fears, learn effective coping skills and decrease her worry about the future.    Pt was informed LCSW will be leaving [facility], and has opted to be reassigned to another therapist, as per this LCSW recommendation.  She has decreased depressive symptoms at this time, however admits that may be in part due to the wonderful visit she had with her family.  Pt had PHQ9 administered, scoring a '5'. Tx plan goals discussed and will be updated in pt chart. Pt may opt to decrease sessions from weekly to every other or monthly sessions, to be decided between pt and new therapist.    Psychological condition is generally: improving  Plan: Patient to F/U with Weekly psychotherapy for 45 minutes each session.    Visit Diagnosis:    ICD-10-CM ICD-9-CM   1. Major depressive disorder, recurrent episode, moderate (CMS/HCC)  F33.1 296.32        Time  Start Time: 1400  End Time: 1445  Total Time: 45  Dorene England LCSW @ 2:55 PM

## 2020-12-02 ENCOUNTER — HOSPITAL ENCOUNTER (OUTPATIENT)
Dept: PHYSICAL THERAPY | Facility: REHABILITATION | Age: 45
Setting detail: THERAPIES SERIES
Discharge: HOME | End: 2020-12-02
Attending: PHYSICAL MEDICINE & REHABILITATION
Payer: COMMERCIAL

## 2020-12-02 DIAGNOSIS — G71.00 MUSCULAR DYSTROPHY, UNSPECIFIED (CMS/HCC): Primary | ICD-10-CM

## 2020-12-02 PROCEDURE — 97542 WHEELCHAIR MNGMENT TRAINING: CPT | Mod: GP,59

## 2020-12-02 PROCEDURE — 97755 ASSISTIVE TECHNOLOGY ASSESS: CPT | Mod: GP,59

## 2020-12-02 PROCEDURE — 97530 THERAPEUTIC ACTIVITIES: CPT | Mod: GP

## 2020-12-02 NOTE — PROGRESS NOTES
PT PROGRESS NOTE FOR OUTPATIENT THERAPY    Patient: Sofia Valente   MRN: [id number]  : 1975 45 y.o.  Referring Physician: Zohaib Guzman MD  Date of Visit: 2020      Certification Dates: 20 through 21    Recommended Frequency & Duration:  Other for up to 6 months     Diagnosis:   1. Muscular dystrophy, unspecified (CMS/HCC)        Chief Complaints:  No chief complaint on file.      Precautions:   Existing Precautions/Restrictions: fall     TODAY'S VISIT:      DELIVERY NOTE for ASSISTIVE TECHNOLOGY     Long Term Goals Time Frame Result Comment/Progress   Independent mobility in new Quantum Stretto power wheelchair  24 weeks MET        Good postural support in new seating system 24 weeks Ongoing Ongoing adjustments as needed for patient independence with function   Good skin pressure management in new seating system 24 weeks MET     Independent transfers to/from bed/toilet/car seat from new Stretto PWC 8 weeks MET Pt is performing 10-12 transfers/day         Serial number of new wheelchair: [id number]        Intervention and results:Sofia Valente is a 45 y.o. female who was seen today for a follow-up visit for ongoing fitting and delivery of the new Quantum Q6 Edge 3 Stretto wheelchair with SARMAD Mckinney of [facility].  Sofia is utilizing her new Quantum Stretto power wheelchair and adjustments were made to optimize posture and alignment as her positioning and alignment is so specific given her proximal weakness due to her FSH MD and she is experiencing progressive weakness and pain in her right shoulder and left forearm which is further limiting her functional abilities. Reinforcement education regarding use of the power seat functions for pain management and repositioning to avoid strain on her upper extremities and severe lumbar lordosis due to her postural asymmetries, pt verbalized understanding.      Interventions:  1. Right armpad was replaced with a  "thinner armpad to improve support under her RUE and adjustment was made to provide the necessary support under her elbow  2. She has a concavity due to the lordosis of her lumbar spine, and she has some discomfort by the end of the day. She has spinal extension with her shoulders posterior to her pelvis in sitting and a 1\" piece of sunmate foam under her shoulders improved her positioning and comfort.  We considered a foam in place custom mold to fill the void however she gets relief from full tilt, recline and legs elevated with passive reversal of her lordosis and with support in her lordosis she did not get relief.  She needs the support only in upright due to her stacked posture and specific alignment due to her proximal weakness due to her FSHMD.      3. Adjustment included fine tuning of the position of the elbow stops B/L    4.  Height adjustment of the armrests to accommodate her resting upper extremity positioning and she will benefit from the addition of a tray for upper extremity positioning due to her proximal weakness and impaired motor control of her trunk and shoulder girdle due to her FSH MD.      RECOMMENDATIONS  - Full tray with angle adjustability and adjustable/removable mounting hardware is necessary to provide upper extremity support for Sofia for distal support of the variable positioning of her upper extremities to address lack of muscle strength against gravity in her BUE with proximal weakness and impaired motor control due to her FSHMD.      Plan: Return to follow-up once tray is ready for delivery.  Consider trial of abdominal stabilizer on PWC for use in car.  Look into additional support options in sitting in PWC for trunk.      ____________________________________________________________  April Thomas, PT, DPT, NCS, ATP/SMS   Date      I have read these recommendations and plan of care and agree with them        _____________________________________________________       " _____________  Zohaib Guzman MD                                                                                        DATE

## 2020-12-02 NOTE — LETTER
[address] [place] [address]  [phone]  {[facility] Plan of Care Fax List:[phone]}    PHYSICAL THERAPY PLAN OF CARE    Patient Name: Sofia Valente    Certification Dates:  From 20  To: 21  Frequency: Other Duration: 6 months  Other: 1-4 follow-up visits    Provider: April Thomas, PT     Referring Provider: Zohaib Guzman MD  PCP: Jaycob Peña DO        Payor: Payor: [facility] / Plan: [employer]UE [employer] PPO/HMO / Product Type: Managed Care /   Medical Diagnosis: Muscular dystrophy, unspecified (CMS/HCC) [G71.00]     Rehab Potential:      Planned Services: The patient's treatment will include  , .     By signing this plan of care, I certify this plan of care as correct and necessary for the patient.        Physician Signature: _________________________________________ Date: _________________    Thank you for this referral. Please contact our department with any questions.      April Thomas, PT    PT PROGRESS NOTE FOR OUTPATIENT THERAPY    Patient: Sofia Valente   MRN: [id number]  : 1975 45 y.o.  Referring Physician: Zohaib Guzman MD  Date of Visit: 2020      Certification Dates: 20 through 21    Recommended Frequency & Duration:  Other for up to 6 months     Diagnosis:   1. Muscular dystrophy, unspecified (CMS/HCC)        Chief Complaints:  No chief complaint on file.      Precautions:   Existing Precautions/Restrictions: fall     TODAY'S VISIT:      DELIVERY NOTE for ASSISTIVE TECHNOLOGY     Long Term Goals Time Frame Result Comment/Progress   Independent mobility in new Quantum Stretto power wheelchair  24 weeks MET        Good postural support in new seating system 24 weeks Ongoing Ongoing adjustments as needed for patient independence with function   Good skin pressure management in new seating system 24 weeks MET     Independent transfers to/from bed/toilet/car seat from new Stretto C 8 weeks MET Pt is  "performing 10-12 transfers/day         Serial number of new wheelchair: [id number]        Intervention and results:Sofia Valente is a 45 y.o. female who was seen today for a follow-up visit for ongoing fitting and delivery of the new Quantum Q6 Edge 3 Stretto wheelchair with SARMAD Mckinney of [facility].  Sofia is utilizing her new Quantum Stretto power wheelchair and adjustments were made to optimize posture and alignment as her positioning and alignment is so specific given her proximal weakness due to her FSH MD and she is experiencing progressive weakness and pain in her right shoulder and left forearm which is further limiting her functional abilities. Reinforcement education regarding use of the power seat functions for pain management and repositioning to avoid strain on her upper extremities and severe lumbar lordosis due to her postural asymmetries, pt verbalized understanding.      Interventions:  1. Right armpad was replaced with a thinner armpad to improve support under her RUE and adjustment was made to provide the necessary support under her elbow  2. She has a concavity due to the lordosis of her lumbar spine, and she has some discomfort by the end of the day. She has spinal extension with her shoulders posterior to her pelvis in sitting and a 1\" piece of sunmate foam under her shoulders improved her positioning and comfort.  We considered a foam in place custom mold to fill the void however she gets relief from full tilt, recline and legs elevated with passive reversal of her lordosis and with support in her lordosis she did not get relief.  She needs the support only in upright due to her stacked posture and specific alignment due to her proximal weakness due to her FSHMD.      3. Adjustment included fine tuning of the position of the elbow stops B/L    4.  Height adjustment of the armrests to accommodate her resting upper extremity positioning and she will benefit from the " addition of a tray for upper extremity positioning due to her proximal weakness and impaired motor control of her trunk and shoulder girdle due to her FSH MD.      RECOMMENDATIONS  - Full tray with angle adjustability and adjustable/removable mounting hardware is necessary to provide upper extremity support for Sofia for distal support of the variable positioning of her upper extremities to address lack of muscle strength against gravity in her BUE with proximal weakness and impaired motor control due to her FSHMD.      Plan: Return to follow-up once tray is ready for delivery.  Consider trial of abdominal stabilizer on PWC for use in car.  Look into additional support options in sitting in PWC for trunk.      ____________________________________________________________  April Thomas, PT, DPT, NCS, ATP/SMS   Date      I have read these recommendations and plan of care and agree with them        _____________________________________________________       _____________  Zohaib Guzman MD                                                                                        DATE

## 2020-12-02 NOTE — OP PT TREATMENT LOG
"  DELIVERY NOTE for ASSISTIVE TECHNOLOGY     Long Term Goals Time Frame Result Comment/Progress   Independent mobility in new Quantum Stretto power wheelchair  24 weeks MET        Good postural support in new seating system 24 weeks Ongoing Ongoing adjustments as needed for patient independence with function   Good skin pressure management in new seating system 24 weeks MET     Independent transfers to/from bed/toilet/car seat from new Stretto PWC 8 weeks MET Pt is performing 10-12 transfers/day         Serial number of new wheelchair: [id number]        Intervention and results:Sfoia Valente is a 45 y.o. female who was seen today for a follow-up visit for ongoing fitting and delivery of the new Quantum Q6 Edge 3 Stretto wheelchair with SARMAD Mckinney of [facility].  Sofia is utilizing her new Quantum Stretto power wheelchair and adjustments were made to optimize posture and alignment as her positioning and alignment is so specific given her proximal weakness due to her FSH MD and she is experiencing progressive weakness and pain in her right shoulder and left forearm which is further limiting her functional abilities. Reinforcement education regarding use of the power seat functions for pain management and repositioning to avoid strain on her upper extremities and severe lumbar lordosis due to her postural asymmetries, pt verbalized understanding.      Interventions:  1. Right armpad was replaced with a thinner armpad to improve support under her RUE and adjustment was made to provide the necessary support under her elbow  2. She has a concavity due to the lordosis of her lumbar spine, and she has some discomfort by the end of the day. She has spinal extension with her shoulders posterior to her pelvis in sitting and a 1\" piece of sunmate foam under her shoulders improved her positioning and comfort.  We considered a foam in place custom mold to fill the void however she gets relief from full tilt, " recline and legs elevated with passive reversal of her lordosis and with support in her lordosis she did not get relief.  She needs the support only in upright due to her stacked posture and specific alignment due to her proximal weakness due to her FSHMD.      3. Adjustment included fine tuning of the position of the elbow stops B/L    4.  Height adjustment of the armrests to accommodate her resting upper extremity positioning and she will benefit from the addition of a tray for upper extremity positioning due to her proximal weakness and impaired motor control of her trunk and shoulder girdle due to her FSH MD.      RECOMMENDATIONS  - Full tray with angle adjustability and adjustable/removable mounting hardware is necessary to provide upper extremity support for Sofia for distal support of the variable positioning of her upper extremities to address lack of muscle strength against gravity in her BUE with proximal weakness and impaired motor control due to her FSHMD.      Plan: Return to follow-up once tray is ready for delivery.  Consider trial of abdominal stabilizer on PWC for use in car.  Look into additional support options in sitting in PWC for trunk.

## 2020-12-02 NOTE — LETTER
[address] [address]  [phone]  [employer] Fax: [phone]      PT PROGRESS NOTE FOR OUTPATIENT THERAPY    Patient: Sofia Valente   MRN: [id number]  : 1975 45 y.o.  Referring Physician: Zohaib Guzman MD  Date of Visit: 2020      Certification Dates: 20 through 21    Recommended Frequency & Duration:  Other for up to 6 months     Diagnosis:   1. Muscular dystrophy, unspecified (CMS/HCC)        Chief Complaints:  No chief complaint on file.      Precautions:   Existing Precautions/Restrictions: fall     TODAY'S VISIT:      DELIVERY NOTE for ASSISTIVE TECHNOLOGY     Long Term Goals Time Frame Result Comment/Progress   Independent mobility in new Quantum Stretto power wheelchair  24 weeks MET        Good postural support in new seating system 24 weeks Ongoing Ongoing adjustments as needed for patient independence with function   Good skin pressure management in new seating system 24 weeks MET     Independent transfers to/from bed/toilet/car seat from new Stretto PWC 8 weeks MET Pt is performing 10-12 transfers/day         Serial number of new wheelchair: [id number]        Intervention and results:Sofia Valente is a 45 y.o. female who was seen today for a follow-up visit for ongoing fitting and delivery of the new Quantum Q6 Edge 3 Stretto wheelchair with SARMAD Mckin[facility] of [facility].  Sofia is utilizing her new Quantum Stretto power wheelchair and adjustments were made to optimize posture and alignment as her positioning and alignment is so specific given her proximal weakness due to her FSH MD and she is experiencing progressive weakness and pain in her right shoulder and left forearm which is further limiting her functional abilities. Reinforcement education regarding use of the power seat functions for pain management and repositioning to avoid strain on her upper extremities and severe lumbar lordosis due to her postural  "asymmetries, pt verbalized understanding.      Interventions:  1. Right armpad was replaced with a thinner armpad to improve support under her RUE and adjustment was made to provide the necessary support under her elbow  2. She has a concavity due to the lordosis of her lumbar spine, and she has some discomfort by the end of the day. She has spinal extension with her shoulders posterior to her pelvis in sitting and a 1\" piece of sunmate foam under her shoulders improved her positioning and comfort.  We considered a foam in place custom mold to fill the void however she gets relief from full tilt, recline and legs elevated with passive reversal of her lordosis and with support in her lordosis she did not get relief.  She needs the support only in upright due to her stacked posture and specific alignment due to her proximal weakness due to her FSHMD.      3. Adjustment included fine tuning of the position of the elbow stops B/L    4.  Height adjustment of the armrests to accommodate her resting upper extremity positioning and she will benefit from the addition of a tray for upper extremity positioning due to her proximal weakness and impaired motor control of her trunk and shoulder girdle due to her FSH MD.      RECOMMENDATIONS  - Full tray with angle adjustability and adjustable/removable mounting hardware is necessary to provide upper extremity support for Sofia for distal support of the variable positioning of her upper extremities to address lack of muscle strength against gravity in her BUE with proximal weakness and impaired motor control due to her FSHMD.      Plan: Return to follow-up once tray is ready for delivery.  Consider trial of abdominal stabilizer on PWC for use in car.  Look into additional support options in sitting in PWC for trunk.          ____________________________________________________________  April Thomas, PT, DPT, NCS, ATP/SMS   Date      I have read these recommendations and plan of " care and agree with them        _____________________________________________________       _____________  Zohaib Guzman MD                                                                                        DATE

## 2020-12-07 ENCOUNTER — TRANSCRIBE ORDERS (OUTPATIENT)
Dept: SCHEDULING | Age: 45
End: 2020-12-07

## 2020-12-07 ENCOUNTER — APPOINTMENT (OUTPATIENT)
Dept: LAB | Facility: CLINIC | Age: 45
End: 2020-12-07
Attending: INTERNAL MEDICINE
Payer: COMMERCIAL

## 2020-12-07 DIAGNOSIS — K50.10 CROHN'S DISEASE OF LARGE INTESTINE WITHOUT COMPLICATIONS (CMS/HCC): Primary | ICD-10-CM

## 2020-12-07 DIAGNOSIS — K90.0 CELIAC DISEASE: ICD-10-CM

## 2020-12-07 DIAGNOSIS — K50.10 CROHN'S DISEASE OF LARGE INTESTINE WITHOUT COMPLICATIONS (CMS/HCC): ICD-10-CM

## 2020-12-07 LAB
ALBUMIN SERPL-MCNC: 4.2 G/DL (ref 3.4–5)
ALP SERPL-CCNC: 39 IU/L (ref 35–126)
ALT SERPL-CCNC: 25 IU/L (ref 11–54)
ANION GAP SERPL CALC-SCNC: 11 MEQ/L (ref 3–15)
AST SERPL-CCNC: 27 IU/L (ref 15–41)
BASOPHILS # BLD: 0.03 K/UL (ref 0.01–0.1)
BASOPHILS NFR BLD: 0.6 %
BILIRUB SERPL-MCNC: 0.7 MG/DL (ref 0.3–1.2)
BUN SERPL-MCNC: 5 MG/DL (ref 8–20)
BURR CELLS BLD QL SMEAR: ABNORMAL
CALCIUM SERPL-MCNC: 9.6 MG/DL (ref 8.9–10.3)
CHLORIDE SERPL-SCNC: 100 MEQ/L (ref 98–109)
CO2 SERPL-SCNC: 30 MEQ/L (ref 22–32)
CREAT SERPL-MCNC: <0.3 MG/DL (ref 0.6–1.1)
CRP SERPL-MCNC: 6.36 MG/L
DIFFERENTIAL METHOD BLD: ABNORMAL
EOSINOPHIL # BLD: 0.04 K/UL (ref 0.04–0.36)
EOSINOPHIL NFR BLD: 0.8 %
ERYTHROCYTE [DISTWIDTH] IN BLOOD BY AUTOMATED COUNT: 13.7 % (ref 11.7–14.4)
FERRITIN SERPL-MCNC: 69 NG/ML (ref 11–250)
FOLATE SERPL-MCNC: 15.5 NG/ML
GFR SERPL CREATININE-BSD FRML MDRD: ABNORMAL ML/MIN/{1.73_M2}
GLUCOSE SERPL-MCNC: 94 MG/DL (ref 70–99)
HCT VFR BLDCO AUTO: 44 % (ref 35–45)
HGB BLD-MCNC: 13.8 G/DL (ref 11.8–15.7)
IMM GRANULOCYTES # BLD AUTO: 0.01 K/UL (ref 0–0.08)
IMM GRANULOCYTES NFR BLD AUTO: 0.2 %
IRON SATN MFR SERPL: 25 % (ref 15–45)
IRON SERPL-MCNC: 41 UG/DL (ref 35–150)
LYMPHOCYTES # BLD: 0.97 K/UL (ref 1.2–3.5)
LYMPHOCYTES NFR BLD: 18.3 %
MCH RBC QN AUTO: 30.7 PG (ref 28–33.2)
MCHC RBC AUTO-ENTMCNC: 31.4 G/DL (ref 32.2–35.5)
MCV RBC AUTO: 98 FL (ref 83–98)
MONOCYTES # BLD: 0.5 K/UL (ref 0.28–0.8)
MONOCYTES NFR BLD: 9.4 %
NEUTROPHILS # BLD: 3.76 K/UL (ref 1.7–7)
NEUTS SEG NFR BLD: 70.7 %
NRBC BLD-RTO: 0 %
PDW BLD AUTO: 11 FL (ref 9.4–12.3)
PLATELET # BLD AUTO: 166 K/UL (ref 150–369)
PLATELET # BLD EST: ABNORMAL 10*3/UL
PLATELET CLUMP BLD QL SMEAR: PRESENT
POLYCHROMASIA BLD QL SMEAR: ABNORMAL
POTASSIUM SERPL-SCNC: 4.8 MEQ/L (ref 3.6–5.1)
PROT SERPL-MCNC: 6.6 G/DL (ref 6–8.2)
RBC # BLD AUTO: 4.49 M/UL (ref 3.93–5.22)
SODIUM SERPL-SCNC: 141 MEQ/L (ref 136–144)
TIBC SERPL-MCNC: 162 UG/DL (ref 270–460)
UIBC SERPL-MCNC: 121 UG/DL (ref 180–360)
VIT B12 SERPL-MCNC: 405 PG/ML (ref 180–914)
WBC # BLD AUTO: 5.31 K/UL (ref 3.8–10.5)

## 2020-12-07 PROCEDURE — 82728 ASSAY OF FERRITIN: CPT

## 2020-12-07 PROCEDURE — 83516 IMMUNOASSAY NONANTIBODY: CPT

## 2020-12-07 PROCEDURE — 83540 ASSAY OF IRON: CPT

## 2020-12-07 PROCEDURE — 82607 VITAMIN B-12: CPT

## 2020-12-07 PROCEDURE — 82746 ASSAY OF FOLIC ACID SERUM: CPT

## 2020-12-07 PROCEDURE — 85025 COMPLETE CBC W/AUTO DIFF WBC: CPT

## 2020-12-07 PROCEDURE — 86140 C-REACTIVE PROTEIN: CPT

## 2020-12-07 PROCEDURE — 36415 COLL VENOUS BLD VENIPUNCTURE: CPT

## 2020-12-07 PROCEDURE — 80053 COMPREHEN METABOLIC PANEL: CPT

## 2020-12-08 ENCOUNTER — TELEMEDICINE (OUTPATIENT)
Dept: PSYCHIATRY | Facility: HOSPITAL | Age: 45
End: 2020-12-08
Attending: SOCIAL WORKER
Payer: COMMERCIAL

## 2020-12-08 ENCOUNTER — TELEMEDICINE (OUTPATIENT)
Dept: PSYCHIATRY | Facility: HOSPITAL | Age: 45
End: 2020-12-08
Attending: PSYCHIATRY & NEUROLOGY
Payer: COMMERCIAL

## 2020-12-08 DIAGNOSIS — F41.1 GAD (GENERALIZED ANXIETY DISORDER): ICD-10-CM

## 2020-12-08 DIAGNOSIS — F33.1 MAJOR DEPRESSIVE DISORDER, RECURRENT EPISODE, MODERATE (CMS/HCC): Primary | ICD-10-CM

## 2020-12-08 LAB — GLIADIN IGG SER IA-ACNC: 2 UNITS

## 2020-12-08 PROCEDURE — 99213 OFFICE O/P EST LOW 20 MIN: CPT | Mod: 95 | Performed by: PSYCHIATRY & NEUROLOGY

## 2020-12-08 PROCEDURE — 90834 PSYTX W PT 45 MINUTES: CPT | Mod: 95 | Performed by: SOCIAL WORKER

## 2020-12-08 RX ORDER — CITALOPRAM 10 MG/1
5 TABLET ORAL DAILY
Qty: 45 TABLET | Refills: 0 | Status: SHIPPED | OUTPATIENT
Start: 2020-12-08 | End: 2021-03-17 | Stop reason: SDUPTHER

## 2020-12-08 ASSESSMENT — COGNITIVE AND FUNCTIONAL STATUS - GENERAL
SLEEP_WAKE_CYCLE: NO CHANGE
APPETITE: NO CHANGE
THOUGHT_PROCESS: WNL
IMPULSE CONTROL: INTACT
EST. PREMORBID INTELLIGENCE: AVERAGE
EYE_CONTACT: WNL
SPEECH: REGULAR
PSYCHOMOTOR FUNCTIONING: WNL
AROUSAL LEVEL: ALERT
CONCENTRATION: WNL
ORIENTATION: FULLY ORIENTED
ATTENTION: WNL
REMOTE MEMORY: WNL
APPEARANCE: WELL GROOMED
PERCEPTUAL FUNCTION: NORMAL
THOUGHT_CONTENT: APPROPRIATE
MOOD: EUTHYMIC (NORMAL);DEPRESSED;FRUSTRATED
RECENT MEMORY: WNL
AFFECT: FULL RANGE
LIBIDO: NON-CONTRIBUTORY
DELUSIONS: NONE OR AGE APPROPRIATE
INSIGHT: INTACT
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
## 2020-12-08 NOTE — PROGRESS NOTES
Sofia Valente is a 45 y.o. female who presents for Follow-up and Med Management.    Telemedicine Service  This visit occurred via telemedicine services with the consent of the patient.  Patient location: [facility]  Provider location: [address] [place]  Those who participated in the encounter: Patient, Provider  Able to reach patient at : [phone]     Identified patient by name and birthdate, introduced myself and location, confirmed patient's location and private setting.  The details regarding Zoom platform including letting patient know the video conference platform is private confidential secure and real-time.  The conversation is not being recorded.  No other participants in the video conference beyond noted above.  Informed that a summary of our appointment would be entered into the medical record and [employer] [facility] with bill for the session through telemedicine billing codes.  Patient informed of right to choose form of delivery service, including right to refuse video session.  Patient consents to behavioral health treatment    Sad to end with Dorene, therapist who is leaving the program.  Would like a monthly therapist  Considering Hattie Morse LCSW    Overall Celexa 5mg tolerated and helpful  Overal optimistic  Appetite - is back    Less tearful  Worry - less intense  Sleep - 7 hours most nights    Ongoing pain. Seeing Dr Lima, physiatrist, will return tomorrow to review options again, left side pain. Needle procedure worsened pain. Will likely go for cortisone injection.  Shoulder pain persists.    GI doctor - will take 3,000IU  Dr Mauricio - gave follow up script in April    Initially insurance said vit d level not covered, but patient has not yet been charged. Will submit further documentation as needed.                 Psychiatric ROS:   Sleep:Good and Fair  Appetite: Comments: improved  Libido: Comments: not reviewed  Exercise: less often due to pain  ETOH/Substances:denies     Medical  Review of Systems  Review of Systems   Constitutional: Negative for fatigue and fever.   Respiratory: Negative for cough and shortness of breath.    Gastrointestinal: Negative for constipation, diarrhea and nausea.   Musculoskeletal: Positive for arthralgias and myalgias.   Neurological: Negative for light-headedness and headaches.       PMSH: No changes were made to non-psychiatric medications/allergies/medical history.     Risk/Benefit/side Effects discussed regarding the following medications:  See above  PDMP Queried: Yes    Allergies:   Allergies   Allergen Reactions   • Gluten    • Gluten Protein GI intolerance   • Morphine    • Penicillins GI intolerance       Current Outpatient Medications:   •  calcium-vits M3-U-K1-minerals 166.75 mg- 166.75 unit capsule, calcium, , Disp: , Rfl:   •  cholecalciferol, vitamin D3, 5,000 unit (125 mcg) capsule, Take 5,000 Units by mouth daily., , Disp: , Rfl:   •  ciclopirox (PENLAC) 8 % solution, Apply topically nightly. Apply over nail and surrounding skin. Apply daily over previous coat. After seven (7) days, may remove with alcohol and continue cycle. Apply for up to 49 weeks., , Disp: 6.6 mL, Rfl: 1  •  citalopram (CeleXA) 10 mg tablet, Take 0.5 tablets (5 mg total) by mouth daily., , Disp: 45 tablet, Rfl: 0  •  lidocaine (LIDODERM) 5 % patch, lidocaine 5 % topical patch  APPLY 1 PATCH BY TOPICAL ROUTE ONCE DAILY (MAY WEAR UP TO 12HOURS.) TO RIGHT SHOULDER FOR NEUROPATHIC PAIN, , Disp: , Rfl:   •  multivit with minerals/lutein (MULTIVITAMIN 50 PLUS ORAL), multivitamin, , Disp: , Rfl:   •  oxybutynin (DITROPAN) 5 mg tablet, Take 1 tablet (5 mg total) by mouth 3 (three) times a day., , Disp: 270 tablet, Rfl: 1  •  tavaborole 5 % solution with applicator, Apply topically daily. For up to 48 weeks., , Disp: 4 mL, Rfl: 1  •  zolpidem (AMBIEN) 5 mg tablet, Take 1 tablet (5 mg total) by mouth nightly., , Disp: 30 tablet, Rfl: 1    Objective     Physical Exam    There were no  vitals taken for this visit.    MENTAL STATUS EXAM  Appearance: well groomed and appropriate attire  Gait and Motor: slow  Speech: normal rate/rhythm/volume and fluent  Mood: better  Affect: normal  Associations: coherent  Thought Process: goal-directed  Thought Content: no auditory or visual hallucinations. and appropriate to situation  Suicidality/Homicidality: denies  Judgement/Insight: good and help accepting  Orientation: day, month and year  Memory: recalls recent events and recalls remote events  Attention: alert  Knowledge: normal  Language: normal       Brief Psychiatric Formulation:   Patient is a 45-year-old  G0, P0 female with history of FSHD (facioscapulohumeral muscular dystrophy), celiac disease,Crohn's disease, arthritis, chronic pain status post bone stimulator removal with mood and anxiety symptoms consistent with MDD recurrent moderate and PARISA. No dangerousness to self or others.  Strengths include intelligence, good family support.     Patient seen for PEV 9/9/2020, trial of Prozac recommended, unfortunately patient with significant activation and inability to tolerate after only several doses, prozac discontinued.  Trial of citalopram initiated by phone mid September 2020.  Patient has tolerated and benefited from same.  Continues to utilize zolpidem prescribed by PCP.     Interval history significant for overall improved mood, improved appetite, less intense worry.  Continues to use zolpidem for sleep.  Patient is sad to transition to new therapist as current therapist has been very beneficial.  Reports vitamin D replacement continues, has not yet been billed for lab study initially thought not covered by insurance.  Discussed plan going forward, options including further titration of citalopram to target mood and anxiety and/or augmentation with gabapentin to target sleep and pain while possibly tapering/discontinuing zolpidem.    Plan:  Continue citalopram 5 mg p.o. daily, consider  titration if mood and anxiety worsen  Continue zolpidem 5 mg p.o. nightly, prescribed by PCP, consider gabapentin as noted above  Continue vitamin D replacement, defer to GI and PCP for ongoing monitoring  Continue individual therapy with transition plan per current therapist, [facility] team  Follow-up med check 8 weeks    Visit Diagnosis:    ICD-10-CM ICD-9-CM   1. Major depressive disorder, recurrent episode, moderate (CMS/HCC)  F33.1 296.32   2. PARISA (generalized anxiety disorder)  F41.1 300.02       Duration:  20 minutes  Devora Todd MD @ 9:17 AM

## 2020-12-09 LAB — TTG IGA SER IA-ACNC: 1.4 ELIA U/ML

## 2020-12-09 ASSESSMENT — ENCOUNTER SYMPTOMS
CONSTIPATION: 0
MYALGIAS: 1
FEVER: 0
HEADACHES: 0
NAUSEA: 0
FATIGUE: 0
LIGHT-HEADEDNESS: 0
COUGH: 0
SHORTNESS OF BREATH: 0
DIARRHEA: 0
ARTHRALGIAS: 1

## 2020-12-10 ENCOUNTER — APPOINTMENT (OUTPATIENT)
Dept: LAB | Facility: CLINIC | Age: 45
End: 2020-12-10
Attending: INTERNAL MEDICINE
Payer: COMMERCIAL

## 2020-12-10 ENCOUNTER — OFFICE VISIT (OUTPATIENT)
Dept: PRIMARY CARE | Facility: CLINIC | Age: 45
End: 2020-12-10
Payer: COMMERCIAL

## 2020-12-10 VITALS
TEMPERATURE: 98.7 F | SYSTOLIC BLOOD PRESSURE: 118 MMHG | DIASTOLIC BLOOD PRESSURE: 79 MMHG | HEART RATE: 91 BPM | OXYGEN SATURATION: 97 %

## 2020-12-10 DIAGNOSIS — K50.919 CROHN'S DISEASE WITH COMPLICATION, UNSPECIFIED GASTROINTESTINAL TRACT LOCATION (CMS/HCC): ICD-10-CM

## 2020-12-10 DIAGNOSIS — G71.02 FSHD (FACIOSCAPULOHUMERAL MUSCULAR DYSTROPHY) (CMS/HCC): Primary | ICD-10-CM

## 2020-12-10 DIAGNOSIS — R53.1 WEAKNESS: ICD-10-CM

## 2020-12-10 DIAGNOSIS — G82.52 QUADRIPLEGIA, C1-C4 INCOMPLETE (CMS/HCC): ICD-10-CM

## 2020-12-10 DIAGNOSIS — E55.9 VITAMIN D DEFICIENCY, UNSPECIFIED: ICD-10-CM

## 2020-12-10 DIAGNOSIS — K90.0 CELIAC DISEASE: ICD-10-CM

## 2020-12-10 PROCEDURE — 99214 OFFICE O/P EST MOD 30 MIN: CPT | Performed by: INTERNAL MEDICINE

## 2020-12-10 PROCEDURE — 83993 ASSAY FOR CALPROTECTIN FECAL: CPT

## 2020-12-10 RX ORDER — TRAMADOL HYDROCHLORIDE 50 MG/1
TABLET ORAL
COMMUNITY
Start: 2020-11-30 | End: 2021-03-25 | Stop reason: SDUPTHER

## 2020-12-10 RX ORDER — CICLOPIROX 80 MG/ML
SOLUTION TOPICAL DAILY
COMMUNITY
Start: 2020-11-30 | End: 2021-02-28

## 2020-12-10 NOTE — PROGRESS NOTES
Subjective     Patient ID: Sofia Valente is a 45 y.o. female.    Over past year or so-chronic pain- injections. Scapular fusion- years ago-removed hardware in July. Dr keen- pain mgmt- trigger point injections and lidocaine. Atrophy of biceps very significantly- very quickly- Using L arm more often- epicondylitis in the L elbow.  Lidocaine   Consideration for MRI thoracic region- arthritis R shoulder  Working with Dr alex-  Vit  D def- on replacement.  Generic celexa currently            Review of Systems    Current Outpatient Medications   Medication Sig Dispense Refill   • calcium-vits S3-W-P7-minerals 166.75 mg- 166.75 unit capsule calcium     • cholecalciferol, vitamin D3, 5,000 unit (125 mcg) capsule Take 5,000 Units by mouth daily.     • ciclopirox (PENLAC) 8 % solution Apply topically nightly. Apply over nail and surrounding skin. Apply daily over previous coat. After seven (7) days, may remove with alcohol and continue cycle. Apply for up to 49 weeks. 6.6 mL 1   • ciclopirox (PENLAC) 8 % solution Apply topically daily.     • citalopram (CeleXA) 10 mg tablet Take 0.5 tablets (5 mg total) by mouth daily. 45 tablet 0   • lidocaine (LIDODERM) 5 % patch lidocaine 5 % topical patch   APPLY 1 PATCH BY TOPICAL ROUTE ONCE DAILY (MAY WEAR UP TO 12HOURS.) TO RIGHT SHOULDER FOR NEUROPATHIC PAIN     • multivit with minerals/lutein (MULTIVITAMIN 50 PLUS ORAL) multivitamin     • oxybutynin (DITROPAN) 5 mg tablet Take 1 tablet (5 mg total) by mouth 3 (three) times a day. 270 tablet 1   • traMADoL (ULTRAM) 50 mg tablet      • zolpidem (AMBIEN) 5 mg tablet Take 1 tablet (5 mg total) by mouth nightly. 30 tablet 1   • tavaborole 5 % solution with applicator Apply topically daily. For up to 48 weeks. 4 mL 1     No current facility-administered medications for this visit.      Past Medical History:   Diagnosis Date   • CD (Crohn's disease) (CMS/[facility])    • Celiac disease    • FSHD (facioscapulohumeral muscular  dystrophy) (CMS/[facility])    • Tibial fracture     right tibia transverse fracture   • Urinary incontinence      Family History   Problem Relation Age of Onset   • Celiac disease Biological Mother    • Muscular dystrophy Biological Mother    • No Known Problems Biological Father    • Muscular dystrophy Biological Sister    • Colon cancer Paternal Grandmother    • Lymphoma Paternal Grandfather    • Muscular dystrophy Maternal Grandmother      Past Surgical History:   Procedure Laterality Date   • APPENDECTOMY     • BOWEL RESECTION     • OTHER SURGICAL HISTORY Right     Scapulothoracic fusion      Social History     Socioeconomic History   • Marital status:      Spouse name: Not on file   • Number of children: Not on file   • Years of education: Not on file   • Highest education level: Not on file   Occupational History   • Not on file   Social Needs   • Financial resource strain: Not on file   • Food insecurity     Worry: Not on file     Inability: Not on file   • Transportation needs     Medical: Not on file     Non-medical: Not on file   Tobacco Use   • Smoking status: Never Smoker   • Smokeless tobacco: Never Used   Substance and Sexual Activity   • Alcohol use: Never     Frequency: Never   • Drug use: Never   • Sexual activity: Yes     Partners: Male   Lifestyle   • Physical activity     Days per week: Not on file     Minutes per session: Not on file   • Stress: Not on file   Relationships   • Social connections     Talks on phone: Not on file     Gets together: Not on file     Attends Scientologist service: Not on file     Active member of club or organization: Not on file     Attends meetings of clubs or organizations: Not on file     Relationship status: Not on file   • Intimate partner violence     Fear of current or ex partner: Not on file     Emotionally abused: Not on file     Physically abused: Not on file     Forced sexual activity: Not on file   Other Topics Concern   • Not on file   Social History  Narrative   • Not on file     Allergies   Allergen Reactions   • Gluten    • Gluten Protein GI intolerance   • Morphine    • Penicillins GI intolerance       Objective     Vitals:    12/10/20 1050   BP: 118/79   Pulse: 91   Temp: 37.1 °C (98.7 °F)   SpO2: 97%     There is no height or weight on file to calculate BMI.    Physical Exam    Assessment/Plan   Problem List Items Addressed This Visit        Nervous    Quadriplegia, C1-C4 incomplete (CMS/HCC)       Digestive    Celiac disease     Diet controlled - asymptomatic         CD (Crohn's disease) (CMS/[facility])     Chronic stable. No change to regimen           Vitamin D deficiency, unspecified       Musculoskeletal    FSHD (facioscapulohumeral muscular dystrophy) (CMS/[facility]) - Primary     Reviewed her hx in detail.  Reviewed current issues she is dealing wit has result of this diagnosis.  Will continue to work with her doctors as directed            Other    Weakness        No orders of the defined types were placed in this encounter.

## 2020-12-16 ENCOUNTER — TELEMEDICINE (OUTPATIENT)
Dept: PSYCHIATRY | Facility: HOSPITAL | Age: 45
End: 2020-12-16
Attending: SOCIAL WORKER
Payer: COMMERCIAL

## 2020-12-16 DIAGNOSIS — F33.1 MAJOR DEPRESSIVE DISORDER, RECURRENT EPISODE, MODERATE (CMS/HCC): Primary | ICD-10-CM

## 2020-12-16 PROCEDURE — 90834 PSYTX W PT 45 MINUTES: CPT | Mod: 95 | Performed by: SOCIAL WORKER

## 2020-12-16 NOTE — PROGRESS NOTES
Request for Consent  Patient provided verbal consent to treat via telemedicine. Clinician introduced the secure telemedicine platform that we are utilizing to provide care during the COVID-19 pandemic. Patient understands the session will be billed to their insurance or patient directly.  Patient was informed only the patient and the clinician are permitted on?the video conference, sessions are not recorded by the clinician, and the patient is not permitted to record the session.? Patient was provided clinician's unique meeting ID prior to session, patient was asked to arrive to virtual session on time just as patient would if we were in the office. Clinician confirmed identification of patient by name and birthdate, provider name, location of patient and clinician, and callback number in case disconnected. Patient consents to behavioral health treatment    Patient Response to Request for Consent: Yes  Visit Type performed: Audio and Video Sofia Valente is a 45 y.o. female who presents for Follow-up.     Presenting Problem:  Pt reports feeling 'up and down' this past week, multiple doctors appointments contributing to vacillating mood.  She continues to experience significant chronic pain and muscle loss due to her MD. She also reports difficulty with feeling unproductive with unstructured time.      Mental Status Exam:        Winn Suicide Severity Rating Scale  Not done today       Assessment:   Pt reports feeling 'up and down' this past week, multiple doctors appointments contributing to vacillating mood.  She continues to experience significant chronic pain and muscle loss due to her MD. She also reports difficulty with feeling unproductive with unstructured time.  Pt reports challenges of this nature since stopping work several years ago, and with her propensity to be organized, as well as goal directed, she's not found a substitute in life for the loss of her professional life.  She also has  limitation on planning her time and limits committments due to the uncertainty of how she will feel physically.     Pt and  will travel to see her family for holidays, and then stay very isolated until vaccine available for COVID19.  LCSW suggested pt consider joining [facility] group during the winter seasons, which pt will consider.      Pt and LCSW had last session today.Pt will be reassigned to Hattie Morse for ongoing therapy moving forward.  Pt will continue to implement coping strategies, however social isolation has been difficult and will continue to be a challenging time over the winter months.    Psychological condition is generally: showing no change  Plan: Patient to F/U with Biweekly psychotherapy for 45 minutes each session.    Visit Diagnosis:    ICD-10-CM ICD-9-CM   1. Major depressive disorder, recurrent episode, moderate (CMS/HCC)  F33.1 296.32        Time  Start Time: 0730  End Time: 0817  Total Time: 47  Dorene England LCSW @ 8:24 AM

## 2020-12-18 LAB — CALPROTECTIN STL-MCNT: 38 MCG/G

## 2020-12-18 NOTE — ASSESSMENT & PLAN NOTE
Reviewed her hx in detail.  Reviewed current issues she is dealing wit has result of this diagnosis.  Will continue to work with her doctors as directed

## 2021-01-20 ENCOUNTER — TELEMEDICINE (OUTPATIENT)
Dept: PSYCHIATRY | Facility: HOSPITAL | Age: 46
End: 2021-01-20
Attending: SOCIAL WORKER
Payer: COMMERCIAL

## 2021-01-20 DIAGNOSIS — F33.1 MAJOR DEPRESSIVE DISORDER, RECURRENT EPISODE, MODERATE (CMS/HCC): Primary | ICD-10-CM

## 2021-01-20 DIAGNOSIS — F41.1 GAD (GENERALIZED ANXIETY DISORDER): ICD-10-CM

## 2021-01-20 PROCEDURE — 90834 PSYTX W PT 45 MINUTES: CPT | Mod: 95 | Performed by: SOCIAL WORKER

## 2021-01-20 ASSESSMENT — COGNITIVE AND FUNCTIONAL STATUS - GENERAL
IMPULSE CONTROL: INTACT
DELUSIONS: NONE OR AGE APPROPRIATE
THOUGHT_PROCESS: WNL
AFFECT: FULL RANGE
EYE_CONTACT: WNL
APPETITE: NO CHANGE
CONCENTRATION: WNL
THOUGHT_CONTENT: APPROPRIATE
ATTENTION: WNL
EST. PREMORBID INTELLIGENCE: AVERAGE
INSIGHT: INTACT
PERCEPTUAL FUNCTION: NORMAL
REMOTE MEMORY: WNL
ORIENTATION: FULLY ORIENTED
SPEECH: REGULAR
AROUSAL LEVEL: ALERT
MOOD: EUTHYMIC (NORMAL)
RECENT MEMORY: WNL
PSYCHOMOTOR FUNCTIONING: WNL
SLEEP_WAKE_CYCLE: NO CHANGE
APPEARANCE: WELL GROOMED
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
## 2021-02-16 ENCOUNTER — TELEMEDICINE (OUTPATIENT)
Dept: PSYCHIATRY | Facility: HOSPITAL | Age: 46
End: 2021-02-16
Attending: PSYCHIATRY & NEUROLOGY
Payer: COMMERCIAL

## 2021-02-16 DIAGNOSIS — F33.1 MAJOR DEPRESSIVE DISORDER, RECURRENT EPISODE, MODERATE (CMS/HCC): Primary | ICD-10-CM

## 2021-02-16 DIAGNOSIS — F41.1 GAD (GENERALIZED ANXIETY DISORDER): ICD-10-CM

## 2021-02-16 PROCEDURE — 99214 OFFICE O/P EST MOD 30 MIN: CPT | Mod: 95 | Performed by: PSYCHIATRY & NEUROLOGY

## 2021-02-16 RX ORDER — LORAZEPAM 0.5 MG/1
0.5 TABLET ORAL DAILY PRN
Qty: 10 TABLET | Refills: 0 | Status: SHIPPED | OUTPATIENT
Start: 2021-02-16 | End: 2021-03-17 | Stop reason: SDUPTHER

## 2021-02-16 NOTE — PROGRESS NOTES
"Sofia Valente is a 46 y.o. female who presents for Follow-up and Med Management.    Telemedicine Service  This visit occurred via telemedicine services with the consent of the patient.  Patient location: [place]  Provider location: [facility]  Those who participated in the encounter: Patient, Provider  Able to reach patient at : [phone]     Identified patient by name and birthdate, introduced myself and location, confirmed patient's location and private setting.  The details regarding Zoom platform including letting patient know the video conference platform is private confidential secure and real-time.  The conversation is not being recorded.  No other participants in the video conference beyond noted above.  Informed that a summary of our appointment would be entered into the medical record and [facility] with bill for the session through telemedicine billing codes.  Patient informed of right to choose form of delivery service, including right to refuse video session.  Patient consents to behavioral health treatment    Mom had a stroke required life flight, admitted to [facility] earlier this week  Very close with sister      \"The worst 2 weeks of my life.\"  Found by dad and sister, could not move right side, transferred to [facility], found clot, removed, expresssive aphasia    Mom (75 year old lives in [place])  also had MD, significant mobility challenges,  right hand key to her independence. Able to get some words out.    Father doing OK, he does not have MD    No vaccine, looking, it is hard to get on a schedule for it    Appetite - fair  Sleep -0 6 hours per week  High anxiety  Tearful    No panic, \"close to it several days.\"  Numb, heartbroken    Scheduled tomorrow with Hattie Morse LCSW    No history of xanax/ ativan    Patient was counseled on the risks/benefits/alternatives/side effects of benzodiazepines in my usual fashion, including sedation, somnolence, risk of CNS " depression with concomitant alcohol use, blackbox warning with concomitant opioid use, physical dependence and withdrawal with risk of seizure if stopped abruptly without medical supervision.        Psychiatric ROS:   Sleep:Fair  Appetite: Fair  Libido: Fair  Exercise: 1-2 days per week  ETOH/Substances:     Medical Review of Systems  Review of Systems    PMSH: No changes were made to non-psychiatric medications/allergies/medical history.     Risk/Benefit/side Effects discussed regarding the following medications:  See above  PDMP Queried: Yes    Allergies:   Allergies   Allergen Reactions   • Gluten    • Gluten Protein GI intolerance   • Morphine    • Penicillins GI intolerance       Current Outpatient Medications:   •  calcium-vits W6-U-F4-minerals 166.75 mg- 166.75 unit capsule, calcium, , Disp: , Rfl:   •  cholecalciferol, vitamin D3, 5,000 unit (125 mcg) capsule, Take 5,000 Units by mouth daily., , Disp: , Rfl:   •  ciclopirox (PENLAC) 8 % solution, Apply topically nightly. Apply over nail and surrounding skin. Apply daily over previous coat. After seven (7) days, may remove with alcohol and continue cycle. Apply for up to 49 weeks., , Disp: 6.6 mL, Rfl: 1  •  ciclopirox (PENLAC) 8 % solution, Apply topically daily., , Disp: , Rfl:   •  citalopram (CeleXA) 10 mg tablet, Take 0.5 tablets (5 mg total) by mouth daily., , Disp: 45 tablet, Rfl: 0  •  lidocaine (LIDODERM) 5 % patch, lidocaine 5 % topical patch  APPLY 1 PATCH BY TOPICAL ROUTE ONCE DAILY (MAY WEAR UP TO 12HOURS.) TO RIGHT SHOULDER FOR NEUROPATHIC PAIN, , Disp: , Rfl:   •  multivit with minerals/lutein (MULTIVITAMIN 50 PLUS ORAL), multivitamin, , Disp: , Rfl:   •  oxybutynin (DITROPAN) 5 mg tablet, Take 1 tablet (5 mg total) by mouth 3 (three) times a day., , Disp: 270 tablet, Rfl: 1  •  tavaborole 5 % solution with applicator, Apply topically daily. For up to 48 weeks., , Disp: 4 mL, Rfl: 1  •  traMADoL (ULTRAM) 50 mg tablet, , , Disp: , Rfl:   •   zolpidem (AMBIEN) 5 mg tablet, TAKE 1 TABLET(5 MG) BY MOUTH EVERY NIGHT, , Disp: 30 tablet, Rfl: 0    Objective     Physical Exam    There were no vitals taken for this visit.    MENTAL STATUS EXAM  Appearance: well groomed  Gait and Motor: no abnormal movements  Speech: slowed  Mood: anxious and sad  Affect: constricted  Associations: coherent  Thought Process: circumstantial  Thought Content: no auditory or visual hallucinations. and appropriate to situation  Suicidality/Homicidality: denies  Judgement/Insight: good  Orientation: day, month and year  Memory: recalls recent events and recalls remote events  Attention: alert  Knowledge: normal  Language: normal     Brief Psychiatric Formulation:   Patient is a 46-year-old  G0, P0 female with history of FSHD (facioscapulohumeral muscular dystrophy), celiac disease,Crohn's disease, arthritis, chronic pain status post bone stimulator removal with mood and anxiety symptoms consistent with MDD recurrent moderate and PARISA. No dangerousness to self or others.  Strengths include intelligence, good family support.     Patient seen for PEV 9/9/2020, trial of Prozac recommended, unfortunately patient with significant activation and inability to tolerate after only several doses, prozac discontinued.  Trial of citalopram initiated by phone mid September 2020.  Patient has tolerated and benefited from same.  Continues to utilize zolpidem prescribed by PCP.  Consider gabapentin augmentation in the future.    Interval history significant for marked distress over mother's change in health status.  Patient's mother suffered a right-sided stroke, further complicated by already limited mobility due to also having muscular dystrophy.  Patient has support of her sister, father remains functional.  Despite periods of overwhelming fear and sadness, patient is functioning.  Discussed risks and benefits of citalopram increase versus as needed lorazepam.  Will trial of low-dose as  needed lorazepam, if needed on a regular basis, low threshold to increase citalopram.  Denies dangerousness to self and others.    Plan:  Lorazepam 0.5 mg p.o. daily as needed  Continue citalopram 5 mg p.o. daily, initiated September 2020  Continue zolpidem 5 mg p.o. nightly, prescribed by PCP, advised caution related to cumulative sedative effect with lorazepam  Continue vitamin D replacement per PCP  Patient encouraged to follow-up with Hattie Morse LCSW as scheduled, will update therapist regarding recent stressor  Follow-up med check March 9th at 9:40  Based on [place] Suicide Screen and current clinical assessment, patient is determined Low Risk.  Suicide Risk/Suicidal Ideation will not be added to patient's treatment plan.   Patient to F/U with treatment goals as outlined in treatment plan.  Patient to F/U with local ED or call 911 should SI/HI arise.    Visit Diagnosis:    ICD-10-CM ICD-9-CM   1. Major depressive disorder, recurrent episode, moderate (CMS/HCC)  F33.1 296.32   2. PARISA (generalized anxiety disorder)  F41.1 300.02     This patient has 2 or more stable chronic illnesses and moderate risk of morbidity due to prescription drug management.    9:40- 9:58 face to face  Devora Todd MD @ 8:35 PM

## 2021-02-17 ENCOUNTER — TELEMEDICINE (OUTPATIENT)
Dept: PSYCHIATRY | Facility: HOSPITAL | Age: 46
End: 2021-02-17
Attending: SOCIAL WORKER
Payer: COMMERCIAL

## 2021-02-17 DIAGNOSIS — F41.1 GAD (GENERALIZED ANXIETY DISORDER): ICD-10-CM

## 2021-02-17 DIAGNOSIS — F33.1 MAJOR DEPRESSIVE DISORDER, RECURRENT EPISODE, MODERATE (CMS/HCC): Primary | ICD-10-CM

## 2021-02-17 PROCEDURE — 90834 PSYTX W PT 45 MINUTES: CPT | Mod: 95 | Performed by: SOCIAL WORKER

## 2021-02-17 ASSESSMENT — COGNITIVE AND FUNCTIONAL STATUS - GENERAL
IMPULSE CONTROL: INTACT
PERCEPTUAL FUNCTION: NORMAL
EST. PREMORBID INTELLIGENCE: AVERAGE
ORIENTATION: FULLY ORIENTED
AROUSAL LEVEL: ALERT
ATTENTION: WNL
APPETITE: NO CHANGE
SLEEP_WAKE_CYCLE: NO CHANGE
MOOD: FRUSTRATED;DEPRESSED;ANXIOUS
APPEARANCE: WELL GROOMED
SPEECH: REGULAR
DELUSIONS: NONE OR AGE APPROPRIATE
EYE_CONTACT: WNL
THOUGHT_PROCESS: WNL
LIBIDO: NON-CONTRIBUTORY
REMOTE MEMORY: WNL
RECENT MEMORY: WNL
INSIGHT: INTACT
THOUGHT_CONTENT: APPROPRIATE
CONCENTRATION: WNL
PSYCHOMOTOR FUNCTIONING: WNL
AFFECT: FULL RANGE;TEARFUL

## 2021-02-17 NOTE — PROGRESS NOTES
"Request for Consent  Patient provided verbal consent to treat via telemedicine. Clinician introduced the secure telemedicine platform that we are utilizing to provide care during the COVID-19 pandemic. Patient understands the session will be billed to their insurance or patient directly.  Patient was informed only the patient and the clinician are permitted on?the video conference, sessions are not recorded by the clinician, and the patient is not permitted to record the session.? Patient was provided clinician's unique meeting ID prior to session, patient was asked to arrive to virtual session on time just as patient would if we were in the office. Clinician confirmed identification of patient by name and birthdate, provider name, location of patient and clinician, and callback number in case disconnected. Patient consents to behavioral health treatment    Patient Response to Request for Consent: Yes  Visit Type performed: Audio and Video      Sofia Valente is a 46 y.o. female who presents for Follow-up.     Presenting Problem:  \"The last two weeks have been the worst weeks of my life. We have been through so many medical issues in my family, but nothing like this stroke my mother just had. She's my rock, and it's so hard to see her struggling the way she is, not being able to communicate with us.\"     Mental Status Exam:  Arousal Level: Alert  Appearance: Well Groomed  Speech: Regular  Psychomotor Functioning: WNL  Eye Contact: WNL  Est. Premorbid Intelligence: Average  Orientation: Fully oriented  Attention: WNL  Concentration: WNL  Recent Memory: WNL  Remote Memory: WNL  Thought Content: Appropriate  Thought Process: WNL  Insight: Intact  Perceptual Function: Normal  Delusions: None or age appropriate  Sleeping: No Change  Appetite: No Change  Libido: Non-Contributory  Affect: Full Range, Tearful  Mood: Frustrated, Depressed, Anxious     [place] Suicide Severity Rating Scale: Not done today    Safe-T " "Assessment:  Not done today       Assessment:   Pt shared that she has had \"the worst two weeks of my life\" as her mother suffered from a stroke unexpectedly. LCSW provided support and validation for pt as she processed the experience of the last two weeks and seeing her mother struggle to speak and working to strengthen her hands, as her mother also has MD and had her stroke on her dominant side. Pt processed her mother's strong will and shared that she feels hopeful but also states \"I used to wake up from nightmares that something happened to my parents and could tell myself it was a dream, but I can't wake up from this dream because it's actually happening to us now.\" Pt processed how she and her sister are working to organize her mother's ongoing care and support her father with these decisions as he is struggling to cope and maintain clarity about her medical needs. Pt shared that her  has been somewhat supportive, but has also been continuing to be hurtful towards her at certain times and is frustrated with the amount of time pt has spent talking with her mother and her mother's providers. Pt shared she is working on focusing on one day at a time, although it is difficult as she worries if her mother will continue to heal and improve and is struggling with the patient required. LCSW and pt discussed taking time for self care and self compassion through this difficult time.   Psychological condition is generally: worsening     Plan:    Patient to F/U with Biweekly psychotherapy for 45 minutes each session.  Patient to F/U with med checks for medication management as directed by prescriber.    Patient to F/U with treatment goals as outlined in treatment plan.  Patient to F/U with local ED or call 911 should SI/HI arise.        Visit Diagnosis:    ICD-10-CM ICD-9-CM   1. Major depressive disorder, recurrent episode, moderate (CMS/HCC)  F33.1 296.32   2. PARISA (generalized anxiety disorder)  F41.1 300.02 "       Time  Start Time: 1730  End Time: 1815  Total Time: 45  Rani Morse, LCSW @ 6:18 PM

## 2021-02-22 ENCOUNTER — IMMUNIZATIONS (OUTPATIENT)
Dept: FAMILY MEDICINE CLINIC | Facility: HOSPITAL | Age: 46
End: 2021-02-22

## 2021-02-22 DIAGNOSIS — Z23 ENCOUNTER FOR IMMUNIZATION: Primary | ICD-10-CM

## 2021-02-22 PROCEDURE — 91300 SARS-COV-2 / COVID-19 MRNA VACCINE (PFIZER-BIONTECH) 30 MCG: CPT

## 2021-02-22 PROCEDURE — 0001A SARS-COV-2 / COVID-19 MRNA VACCINE (PFIZER-BIONTECH) 30 MCG: CPT

## 2021-03-02 RX ORDER — TRAMADOL HYDROCHLORIDE 50 MG/1
TABLET ORAL
Status: CANCELLED | OUTPATIENT
Start: 2021-03-02

## 2021-03-02 RX ORDER — ZOLPIDEM TARTRATE 5 MG/1
5 TABLET ORAL NIGHTLY
Qty: 30 TABLET | Refills: 0 | Status: SHIPPED | OUTPATIENT
Start: 2021-03-02 | End: 2021-03-25 | Stop reason: SDUPTHER

## 2021-03-02 NOTE — TELEPHONE ENCOUNTER
Last OV 12/10  Last filled 1/28  #30  pdmp checked- no red flags    Refused Tramadol refill-it was last filled by Dr Pepe Choudhury on 1/26 for 60 tablets

## 2021-03-12 RX ORDER — OXYBUTYNIN CHLORIDE 5 MG/1
5 TABLET ORAL 3 TIMES DAILY
Qty: 270 TABLET | Refills: 1 | Status: SHIPPED | OUTPATIENT
Start: 2021-03-12 | End: 2021-11-23

## 2021-03-17 ENCOUNTER — IMMUNIZATIONS (OUTPATIENT)
Dept: FAMILY MEDICINE CLINIC | Facility: HOSPITAL | Age: 46
End: 2021-03-17

## 2021-03-17 ENCOUNTER — TELEMEDICINE (OUTPATIENT)
Dept: PSYCHIATRY | Facility: HOSPITAL | Age: 46
End: 2021-03-17
Attending: PSYCHIATRY & NEUROLOGY
Payer: COMMERCIAL

## 2021-03-17 DIAGNOSIS — F33.1 MAJOR DEPRESSIVE DISORDER, RECURRENT EPISODE, MODERATE (CMS/HCC): Primary | ICD-10-CM

## 2021-03-17 DIAGNOSIS — Z23 ENCOUNTER FOR IMMUNIZATION: Primary | ICD-10-CM

## 2021-03-17 DIAGNOSIS — F41.1 GAD (GENERALIZED ANXIETY DISORDER): ICD-10-CM

## 2021-03-17 PROCEDURE — 99214 OFFICE O/P EST MOD 30 MIN: CPT | Mod: 95 | Performed by: PSYCHIATRY & NEUROLOGY

## 2021-03-17 PROCEDURE — 0002A SARS-COV-2 / COVID-19 MRNA VACCINE (PFIZER-BIONTECH) 30 MCG: CPT

## 2021-03-17 PROCEDURE — 91300 SARS-COV-2 / COVID-19 MRNA VACCINE (PFIZER-BIONTECH) 30 MCG: CPT

## 2021-03-17 RX ORDER — LORAZEPAM 0.5 MG/1
0.5 TABLET ORAL DAILY PRN
Qty: 15 TABLET | Refills: 0 | Status: SHIPPED | OUTPATIENT
Start: 2021-03-17 | End: 2021-04-06 | Stop reason: SDUPTHER

## 2021-03-17 RX ORDER — CITALOPRAM 10 MG/1
10 TABLET ORAL DAILY
Qty: 90 TABLET | Refills: 0 | Status: SHIPPED | OUTPATIENT
Start: 2021-03-17 | End: 2021-06-01 | Stop reason: SDUPTHER

## 2021-03-17 NOTE — PROGRESS NOTES
"Sofia Valenet is a 46 y.o. female who presents for Follow-up and Med Management.    Telemedicine Service  This visit occurred via telemedicine services with the consent of the patient.  Patient location: [facility]  Provider location: [facility]  Those who participated in the encounter: Patient, Provider  Able to reach patient at : [phone]     Identified patient by name and birthdate, introduced myself and location, confirmed patient's location and private setting.  The details regarding Zoom platform including letting patient know the video conference platform is private confidential secure and real-time.  The conversation is not being recorded.  No other participants in the video conference beyond noted above.  Informed that a summary of our appointment would be entered into the medical record and [facility] [employer] [facility] with bill for the session through telemedicine billing codes.  Patient informed of right to choose form of delivery service, including right to refuse video session.  Patient consents to behavioral health treatment    \"Not so good.\"  \"I still feel that I am waking up in a nightmare every day.\"    Second vaccine in [place] today.  Patient was helping at her mother's, takes full effort of patient's father, sister and patient. Slow return of arm strength. Her mom is isolated and in need of full family support.      Periods of spiraling, patient also reports good support from family    Sleep - waking in the middle of the night, hard to return to sleep  Energy - low  Anxiety - high  Appetite - fair    Denies SI, no hopelessness    Lorazapam - 5 - 6 times to target periods of overwhelming anxiety, helpful    Plans to switch back to [facility], completing welcome packet. Will see her every 2 weeks.        Psychiatric ROS:   Sleep:Poor  Appetite: Fair    ETOH/Substances:denies     Medical Review of Systems  Review of Systems    PMSH: No changes were made to non-psychiatric " medications/allergies/medical history.     Risk/Benefit/side Effects discussed regarding the following medications:  See below  PDMP Queried: Yes    Allergies:   Allergies   Allergen Reactions   • Gluten    • Gluten Protein GI intolerance   • Morphine    • Penicillins GI intolerance       Current Outpatient Medications:   •  calcium-vits R7-Z-P9-minerals 166.75 mg- 166.75 unit capsule, calcium, , Disp: , Rfl:   •  cholecalciferol, vitamin D3, 5,000 unit (125 mcg) capsule, Take 5,000 Units by mouth daily., , Disp: , Rfl:   •  citalopram (CeleXA) 10 mg tablet, Take 1 tablet (10 mg total) by mouth daily., , Disp: 90 tablet, Rfl: 0  •  lidocaine (LIDODERM) 5 % patch, lidocaine 5 % topical patch  APPLY 1 PATCH BY TOPICAL ROUTE ONCE DAILY (MAY WEAR UP TO 12HOURS.) TO RIGHT SHOULDER FOR NEUROPATHIC PAIN, , Disp: , Rfl:   •  LORazepam (ATIVAN) 0.5 mg tablet, Take 1 tablet (0.5 mg total) by mouth daily as needed for anxiety for up to 15 doses., , Disp: 15 tablet, Rfl: 0  •  multivit with minerals/lutein (MULTIVITAMIN 50 PLUS ORAL), multivitamin, , Disp: , Rfl:   •  oxybutynin (DITROPAN) 5 mg tablet, Take 1 tablet (5 mg total) by mouth 3 (three) times a day., , Disp: 270 tablet, Rfl: 1  •  tavaborole 5 % solution with applicator, Apply topically daily. For up to 48 weeks., , Disp: 4 mL, Rfl: 1  •  traMADoL (ULTRAM) 50 mg tablet, , , Disp: , Rfl:   •  zolpidem (AMBIEN) 5 mg tablet, Take 1 tablet (5 mg total) by mouth nightly., , Disp: 30 tablet, Rfl: 0    Objective     Physical Exam    There were no vitals taken for this visit.    MENTAL STATUS EXAM  Appearance: well groomed  Gait and Motor: increased tone and slow  Speech: fluent  Mood: sad  Affect: anxious  Associations: coherent  Thought Process: goal-directed  Thought Content: no auditory or visual hallucinations. and appropriate to situation  Suicidality/Homicidality: denies  Judgement/Insight: good  Orientation: day, month and year  Memory: recalls recent events and  recalls remote events  Attention: alert  Knowledge: normal  Language: normal     [place] Suicide Severity Rating Scale:  Not done today     Safe-T Assessment:  Not done today       Brief Psychiatric Formulation:   Patient is a 46-year-old  G0, P0 female with history of FSHD (facioscapulohumeral muscular dystrophy), celiac disease,Crohn's disease, arthritis, chronic pain status post bone stimulator removal with mood and anxiety symptoms consistent with MDD recurrent moderate and PARISA. No dangerousness to self or others.  Strengths include intelligence, good family support.     Patient seen for PEV 9/9/2020, trial of Prozac recommended, unfortunately patient with significant activation and inability to tolerate after only several doses, prozac discontinued.  Trial of citalopram initiated by phone mid September 2020.  Patient has tolerated and benefited from same.  Continues to utilize zolpidem prescribed by PCP.  Consider gabapentin augmentation in the future.    Interval history significant for ongoing stress regarding caring for her mother following her stroke.  Mother continues to have severe limitations of mobility, essentially total care at home with support from family and various caregivers.  Patient's anxiety remains high throughout the day most days, low mood, periods of tense sadness.  No dangerousness to self or others.  Tolerated as needed dosing of lorazepam.  Reviewed risks and benefits of further titration of citalopram.    Plan:  Increase citalopram to 10 mg p.o. daily, trial initiated September 2020, history of sensitivity to activation with trial of Prozac, updated citalopram prescription [facility] to [facility]  Continue lorazepam 0.5 mg p.o. daily as needed, updated prescription sent to [facility]  Continue zolpidem 5 mg p.o. nightly prescribed by PCP, reviewed potential cumulative sedative effects with lorazepam  Continue vitamin D replacement, follow-up with PCP as scheduled  Patient will  follow-up with previous therapist Dorene England LCSW  Follow-up med check 4 weeks    Based on [place] Suicide Screen and current clinical assessment, patient is determined Low Risk.  Suicide Risk/Suicidal Ideation will not be added to patient's treatment plan.     Patient to F/U with treatment goals as outlined in treatment plan.  Patient to F/U with local ED or call 911 should SI/HI arise.    Visit Diagnosis:    ICD-10-CM ICD-9-CM   1. Major depressive disorder, recurrent episode, moderate (CMS/HCC)  F33.1 296.32   2. PARISA (generalized anxiety disorder)  F41.1 300.02   This patient has 2 or more worsening chronic illnesses and moderate risk of morbidity due to prescription drug management.      Duration:  20 minutes  Devora Todd MD @ 9:32 AM

## 2021-03-25 ENCOUNTER — TELEMEDICINE (OUTPATIENT)
Dept: PRIMARY CARE | Facility: CLINIC | Age: 46
End: 2021-03-25
Payer: COMMERCIAL

## 2021-03-25 ENCOUNTER — TELEPHONE (OUTPATIENT)
Dept: PRIMARY CARE | Facility: CLINIC | Age: 46
End: 2021-03-25

## 2021-03-25 DIAGNOSIS — G71.00 MUSCULAR DYSTROPHY (CMS/HCC): ICD-10-CM

## 2021-03-25 DIAGNOSIS — F41.9 ANXIETY: ICD-10-CM

## 2021-03-25 DIAGNOSIS — G47.00 INSOMNIA, UNSPECIFIED TYPE: ICD-10-CM

## 2021-03-25 DIAGNOSIS — G71.02 FSHD (FACIOSCAPULOHUMERAL MUSCULAR DYSTROPHY) (CMS/HCC): Primary | ICD-10-CM

## 2021-03-25 PROCEDURE — 99213 OFFICE O/P EST LOW 20 MIN: CPT | Mod: 95 | Performed by: INTERNAL MEDICINE

## 2021-03-25 RX ORDER — ZOLPIDEM TARTRATE 5 MG/1
5 TABLET ORAL NIGHTLY
Qty: 30 TABLET | Refills: 3 | Status: SHIPPED | OUTPATIENT
Start: 2021-03-25 | End: 2021-04-19 | Stop reason: ALTCHOICE

## 2021-03-25 RX ORDER — TRAMADOL HYDROCHLORIDE 50 MG/1
50 TABLET ORAL EVERY 12 HOURS PRN
Qty: 60 TABLET | Refills: 3 | Status: SHIPPED | OUTPATIENT
Start: 2021-03-25 | End: 2021-05-24 | Stop reason: SDUPTHER

## 2021-03-25 ASSESSMENT — ENCOUNTER SYMPTOMS
PALPITATIONS: 0
ARTHRALGIAS: 1
ACTIVITY CHANGE: 0
FATIGUE: 0
APPETITE CHANGE: 0
MYALGIAS: 1
ENDOCRINE NEGATIVE: 1
SHORTNESS OF BREATH: 0
CHEST TIGHTNESS: 0
DIZZINESS: 0
FEVER: 0
HEADACHES: 0
WEAKNESS: 0
EYE PAIN: 0
SLEEP DISTURBANCE: 1
NERVOUS/ANXIOUS: 1
COUGH: 0

## 2021-03-25 NOTE — ASSESSMENT & PLAN NOTE
ambien chronically-  With new anxiety- worsening- will hold ambien for now and then try lorazepam at bedtime to see if this improves sleep.  Will let me know if this makes a difference.

## 2021-03-25 NOTE — PROGRESS NOTES
Verification of Patient Location:  The patient affirms they are currently located in the following state: [place]    Request for Consent:    Audio Only Encounter   You and I are about to have a telemedicine check-in or visit. This is allowed because you have requested it. This telemedicine visit will be billed to your health insurance or you, if you are self-insured. You understand you will be responsible for any copayments or coinsurances that apply to your telemedicine visit. Before starting our telemedicine visit, I am required to get your consent for this virtual check-in or visit by telemedicine. Do you consent?    Patient Response to Request for Consent:  Yes      Visit Documentation:  Subjective     Patient ID: Sofia Valente is a 46 y.o. female.  1975      medcheck    New stressor - mom who also has MD- recently suffered stroke.  Working with her counselor  Some weight loss-    Chronic pain-  Sleep has been off-  Even with the ambien      The following have been reviewed and updated as appropriate in this visit:  Tobacco  Allergies  Meds  Problems  Med Hx  Surg Hx  Fam Hx  Soc Hx        Review of Systems   Constitutional: Negative for activity change, appetite change, fatigue and fever.   HENT: Negative.    Eyes: Negative for pain and visual disturbance.   Respiratory: Negative for cough, chest tightness and shortness of breath.    Cardiovascular: Negative for chest pain and palpitations.   Endocrine: Negative.    Musculoskeletal: Positive for arthralgias, gait problem and myalgias.   Neurological: Negative for dizziness, weakness and headaches.   Psychiatric/Behavioral: Positive for sleep disturbance. The patient is nervous/anxious.          Assessment/Plan   Diagnoses and all orders for this visit:    FSHD (facioscapulohumeral muscular dystrophy) (CMS/[facility]) (Primary)  Assessment & Plan:  Stable- chronic- with ortho and neuro as directed      Muscular dystrophy  (CMS/[facility])    Insomnia, unspecified type  Assessment & Plan:  ambien chronically-  With new anxiety- worsening- will hold ambien for now and then try lorazepam at bedtime to see if this improves sleep.  Will let me know if this makes a difference.      Anxiety  Assessment & Plan:  Working with counselor and psychiatry-  Increased celexa to full 10mg recently      Other orders  -     traMADoL (ULTRAM) 50 mg tablet; Take 1 tablet (50 mg total) by mouth every 12 (twelve) hours as needed for moderate pain.  -     zolpidem (AMBIEN) 5 mg tablet; Take 1 tablet (5 mg total) by mouth nightly.      Time Spent:  I spent 28 minutes on this date of service performing the following activities: obtaining history, entering orders, documenting, preparing for visit and providing counseling and education.

## 2021-03-30 ENCOUNTER — TELEPHONE (OUTPATIENT)
Dept: PSYCHIATRY | Facility: HOSPITAL | Age: 46
End: 2021-03-30

## 2021-03-30 NOTE — TELEPHONE ENCOUNTER
LCSW contacted pt following pt not arriving for scheduled session. Pt informed LCSW that she had forgotten about appt and was currently involved in care taking duties and was unable to attend session at this time. Pt also informed LCSW that she has reconnected with prior therapist at her new practice and will be resuming IT with outside therapist. Pt stated she will continue medication management with [facility] and has future appt scheduled.

## 2021-03-31 ENCOUNTER — DOCUMENTATION (OUTPATIENT)
Dept: PSYCHIATRY | Facility: HOSPITAL | Age: 46
End: 2021-03-31

## 2021-03-31 NOTE — DISCHARGE SUMMARY
Discharge Summary     Patient Name: Sofia Valente  : 1975  Treatment start date: 3/12/2021  Treatment end date: 21    Discharge Type: Mental Health  Discharge Reason: Program Transfer    Reason for admission and treatment: Pt was admitted to improve symptoms of anxiety and depression in the ongoing management of her medical need and within her marital relationship.     Services offered and response to treatment: Pt was engaged in IT and medication management with Psychiatry team.    Client status - Condition upon discharge: Pt was able to experience increase in symptom management and through processing martial situation was able to determine for herself best course of action and worked to improve awareness and communication. Pt experienced increase of stressors including her mother having a stroke and needing significant care and support and pt determined it was best for her to resume care with prior therapist outside of [facility].     Clinical concerns to be addressed in continued care: Pt continue to attend IT with Dorene Barrett LCSW to continue to obtain support regarding coping with mother's illness and ongoing depression and anxiety symptoms. Pt will continue with [facility] for medication management.     Aftercare appointments: Dorene Barrett LCSW bi weekly IT sessions, Dr. Devora Todd 2021 for medication management    Special Instructions: None    Medical Follow Up needed?  No     Is patient receiving Medicated Assisted Treatment? No    Mental Health Crisis Support:    [facility] Crisis Number: [phone]   [facility] Crisis Number: [phone]   [facility] Crisis Number: [phone]   [facility] Crisis Number: [phone]      In case of Mental Health Crisis, go to the closest Emergency Department, call , or contact the National Suicide Prevention Hotline at: 1-421.437.6615  Other Support Agencies:  [place] [employer] [place] [employer]: [phone]    [place]  [facility]: [phone]    [facility] [place]: [phone]      Patient offered a copy of plan? No, Describe Pt not present at time of discharge    Patient provided a copy? No, Describe Pt not present at time of discharge       Rani Morse, FRANNY @ 4:14 PM

## 2021-04-05 ENCOUNTER — TELEPHONE (OUTPATIENT)
Dept: PRIMARY CARE | Facility: CLINIC | Age: 46
End: 2021-04-05

## 2021-04-05 NOTE — TELEPHONE ENCOUNTER
Ok to increase to 30-  Let patricia know that this may only need to be a short term fix- and once she is back into a better routine we may be able to switch her back to ambien

## 2021-04-05 NOTE — TELEPHONE ENCOUNTER
Do you have enough medication for the next 5 days?: No    Did you request this medication through your pharmacy or our patient portal in the last day or two? No    Name of medication requested: LORazepam (ATIVAN)   Medication Strength: 0.5mg  Mediation Directions: Take 1 tablet (0.5 mg total) by mouth daily as needed for anxiety for up to 15 doses.,   Quantity Requested (example 30/90): 30  Number of refills requested:       System Generated Preferred Pharmacy(s)  are as follows.  If more than one pharmacy displays please identify which one should be used for this call    Has the pharmacy information below been confirmed with the patient?       [id number], [place] - [address] [address]  Phone: [phone] Fax: [phone]          If necessary, provide pharmacy details below.   Is this pharmacy a mail order pharmacy?:   Pharmacy Name:   Pharmacy City:   Pharmacy Telephone:     Additional Comments:    Next Encounter with this provider: 8/10/2021

## 2021-04-05 NOTE — TELEPHONE ENCOUNTER
Called and talked to patient. Patient states that usually her therapist Dr Devora Todd writes for her lorazepam, but at last OV with Dr Peña, was to start lorazepam at bedtime to help sleep and stop ambien. Patient states medication has been helping to sleep and has not taken ambien. Due to now medication being for sleep, was told at last OV Dr Peña should order the medication.   Last OV 3/25/21  Last filled by Dr Devora Todd 3/17/21  #15  Cued up for 15, unsure if wanted to change quantity to 30 tabs if you will now be managing medication?  pdmp checked with no red flags

## 2021-04-06 RX ORDER — LORAZEPAM 0.5 MG/1
0.5 TABLET ORAL DAILY PRN
Qty: 30 TABLET | Refills: 0 | Status: SHIPPED | OUTPATIENT
Start: 2021-04-06 | End: 2021-05-06 | Stop reason: SDUPTHER

## 2021-04-06 NOTE — TELEPHONE ENCOUNTER
Last OV 3/25/21  Last filled by Dr Devora Todd 3/17/21  #15  Cued up 30 tabs for you  pdmp checked with no red flags    Patient aware short term management and medication to be approved and sent to pharmacy.

## 2021-04-19 ENCOUNTER — TELEMEDICINE (OUTPATIENT)
Dept: PSYCHIATRY | Facility: HOSPITAL | Age: 46
End: 2021-04-19
Attending: PSYCHIATRY & NEUROLOGY
Payer: COMMERCIAL

## 2021-04-19 DIAGNOSIS — F41.1 GAD (GENERALIZED ANXIETY DISORDER): ICD-10-CM

## 2021-04-19 DIAGNOSIS — F33.1 MAJOR DEPRESSIVE DISORDER, RECURRENT EPISODE, MODERATE (CMS/HCC): Primary | ICD-10-CM

## 2021-04-19 PROCEDURE — 99213 OFFICE O/P EST LOW 20 MIN: CPT | Mod: 95 | Performed by: PSYCHIATRY & NEUROLOGY

## 2021-04-19 NOTE — PROGRESS NOTES
"Sofia Valente is a 46 y.o. female who presents for Follow-up and Med Management.    Telemedicine Service  This visit occurred via telemedicine services with the consent of the patient.  Patient location: [place]  Provider location: [place]  Those who participated in the encounter: Patient, Provider  Able to reach patient at : [phone]     Identified patient by name and birthdate, introduced myself and location, confirmed patient's location and private setting.  The details regarding Zoom platform including letting patient know the video conference platform is private confidential secure and real-time.  The conversation is not being recorded.  No other participants in the video conference beyond noted above.  Informed that a summary of our appointment would be entered into the medical record and [facility] [facility] with bill for the session through telemedicine billing codes.  Patient informed of right to choose form of delivery service, including right to refuse video session.  Patient consents to behavioral health treatment    \"Ok, a little better.\"    Mother home March 4th from facility. Staying at parent's home. Patient's caregiver able to be there 5 days per week.  Mom's spirits have been OK. [facility] team supportive and seeing progress. More verbal overall.    Meeting with Dorene. Down days, caregiving burnout catching up with the family.    Dr Peña - PCP. Met with him.  Sleep broken  Ambien - wears off after 6 hours.  Discontinued Ambien 5mg po qhs  lorazapam 0.5mg po qhs instead, helpful with sleep    Overall more good days than bad days.  Appetite - good  ETOH - denies  Denies TRAMAINE England LCSW 1 - 2 times per month        Psychiatric ROS:   Sleep:Comments: Improved, disrupted on occasion  Appetite: Fair  Exercise: 1-2 days per week  ETOH/Substances:denies     Medical Review of Systems  Review of Systems    PMSH: Updates were made to non psychiatric medications.   Current " Outpatient Medications:   •  calcium-vits P5-L-R9-minerals 166.75 mg- 166.75 unit capsule, calcium, Disp: , Rfl:   •  cholecalciferol, vitamin D3, 5,000 unit (125 mcg) capsule, Take 5,000 Units by mouth daily., Disp: , Rfl:   •  citalopram (CeleXA) 10 mg tablet, Take 1 tablet (10 mg total) by mouth daily., Disp: 90 tablet, Rfl: 0  •  lidocaine (LIDODERM) 5 % patch, lidocaine 5 % topical patch  APPLY 1 PATCH BY TOPICAL ROUTE ONCE DAILY (MAY WEAR UP TO 12HOURS.) TO RIGHT SHOULDER FOR NEUROPATHIC PAIN, Disp: , Rfl:   •  LORazepam (ATIVAN) 0.5 mg tablet, Take 1 tablet (0.5 mg total) by mouth daily as needed for anxiety for up to 15 doses., Disp: 30 tablet, Rfl: 0  •  multivit with minerals/lutein (MULTIVITAMIN 50 PLUS ORAL), multivitamin, Disp: , Rfl:   •  oxybutynin (DITROPAN) 5 mg tablet, Take 1 tablet (5 mg total) by mouth 3 (three) times a day., Disp: 270 tablet, Rfl: 1  •  tavaborole 5 % solution with applicator, Apply topically daily. For up to 48 weeks., Disp: 4 mL, Rfl: 1  •  traMADoL (ULTRAM) 50 mg tablet, Take 1 tablet (50 mg total) by mouth every 12 (twelve) hours as needed for moderate pain., Disp: 60 tablet, Rfl: 3     Risk/Benefit/side Effects discussed regarding the following medications:  See below  PDMP Queried: Yes    Allergies:   Allergies   Allergen Reactions   • Gluten    • Gluten Protein GI intolerance   • Morphine    • Penicillins GI intolerance       Current Outpatient Medications:   •  calcium-vits S5-R-W8-minerals 166.75 mg- 166.75 unit capsule, calcium, , Disp: , Rfl:   •  cholecalciferol, vitamin D3, 5,000 unit (125 mcg) capsule, Take 5,000 Units by mouth daily., , Disp: , Rfl:   •  citalopram (CeleXA) 10 mg tablet, Take 1 tablet (10 mg total) by mouth daily., , Disp: 90 tablet, Rfl: 0  •  lidocaine (LIDODERM) 5 % patch, lidocaine 5 % topical patch  APPLY 1 PATCH BY TOPICAL ROUTE ONCE DAILY (MAY WEAR UP TO 12HOURS.) TO RIGHT SHOULDER FOR NEUROPATHIC PAIN, , Disp: , Rfl:   •  LORazepam  (ATIVAN) 0.5 mg tablet, Take 1 tablet (0.5 mg total) by mouth daily as needed for anxiety for up to 15 doses., , Disp: 30 tablet, Rfl: 0  •  multivit with minerals/lutein (MULTIVITAMIN 50 PLUS ORAL), multivitamin, , Disp: , Rfl:   •  oxybutynin (DITROPAN) 5 mg tablet, Take 1 tablet (5 mg total) by mouth 3 (three) times a day., , Disp: 270 tablet, Rfl: 1  •  tavaborole 5 % solution with applicator, Apply topically daily. For up to 48 weeks., , Disp: 4 mL, Rfl: 1  •  traMADoL (ULTRAM) 50 mg tablet, Take 1 tablet (50 mg total) by mouth every 12 (twelve) hours as needed for moderate pain., , Disp: 60 tablet, Rfl: 3    Objective     Physical Exam    There were no vitals taken for this visit.    MENTAL STATUS EXAM  Appearance: well groomed  Gait and Motor: slow  Speech: normal rate/rhythm/volume  Mood: anxious, sad and 'okay'  Affect: anxious  Associations: coherent  Thought Process: goal-directed  Thought Content: no auditory or visual hallucinations. and appropriate to situation  Suicidality/Homicidality: denies  Judgement/Insight: good  Orientation: day, month and year  Memory: recalls recent events and recalls remote events  Attention: withdrawn  Knowledge: normal  Language: normal     [place] Suicide Severity Rating Scale:  Not done today     Safe-T Assessment:  Not done today       Labs  No new labs.      Brief Psychiatric Formulation:   Patient is a 46-year-old  G0, P0 female with history of FSHD (facioscapulohumeral muscular dystrophy), celiac disease,Crohn's disease, arthritis, chronic pain status post bone stimulator removal with mood and anxiety symptoms consistent with MDD recurrent moderate and PARISA. No dangerousness to self or others.  Strengths include intelligence, good family support.     Patient seen for PEV 9/9/2020, trial of Prozac recommended, unfortunately patient with significant activation and inability to tolerate after only several doses, prozac discontinued.  Trial of citalopram initiated  by phone mid September 2020, Increased citalopram to 10 mg p.o. daily March 17, 2021. Patient has tolerated and benefited from same.  Zolpidem prescribed by PCP.  April, sleep broken, Zolpidem discontinued and lorazepam adjusted to qhs to target sleep. Consider gabapentin augmentation in the future.    Interval history significant for disrupted sleep due to ongoing stress/worry regarding mother's recovery from stroke, staying at her parents home and sharing a hospital bed with her sister who is also disabled requiring wheelchair for mobility.  Patient and PCP adjusted sleep regimen with benefit.  Overall managing mood and anxiety more effectively than mid March.  No dangerousness.  Tolerating and benefiting from increased to citalopram.     Plan:  Continue citalopram 10 mg p.o. daily, trial initiated September 2020, last increased March 17, 2021 history of sensitivity to activation with trial of Prozac, updated citalopram prescription [facility]  Continue lorazepam 0.5 mg p.o. daily as needed, adjusted to nightly as needed, zolpidem discontinued, patient has medication as prescribed by PCP no misuse  Continue vitamin D replacement, follow-up with PCP as scheduled  Continue follow-up with therapist Dorene England LCSW  Follow-up med check 6 to 10 weeks    Based on [place] Suicide Screen and current clinical assessment, patient is determined Low Risk.  Suicide Risk/Suicidal Ideation will not be added to patient's treatment plan.     Patient to F/U with treatment goals as outlined in treatment plan.  Patient to F/U with local ED or call 911 should SI/HI arise.    Visit Diagnosis:    ICD-10-CM ICD-9-CM   1. Major depressive disorder, recurrent episode, moderate (CMS/HCC)  F33.1 296.32   2. PARISA (generalized anxiety disorder)  F41.1 300.02     3:02 3:19  Duration:  17 min  Devora Todd MD @ 4:25 PM

## 2021-05-07 RX ORDER — LORAZEPAM 0.5 MG/1
0.5 TABLET ORAL DAILY PRN
Qty: 30 TABLET | Refills: 0 | Status: SHIPPED | OUTPATIENT
Start: 2021-05-07 | End: 2021-05-24 | Stop reason: SDUPTHER

## 2021-05-24 ENCOUNTER — OFFICE VISIT (OUTPATIENT)
Dept: PRIMARY CARE | Facility: CLINIC | Age: 46
End: 2021-05-24
Payer: COMMERCIAL

## 2021-05-24 VITALS
HEART RATE: 67 BPM | OXYGEN SATURATION: 98 % | TEMPERATURE: 98.3 F | SYSTOLIC BLOOD PRESSURE: 110 MMHG | DIASTOLIC BLOOD PRESSURE: 73 MMHG

## 2021-05-24 DIAGNOSIS — I87.2 VENOUS INSUFFICIENCY OF RIGHT LOWER EXTREMITY: ICD-10-CM

## 2021-05-24 DIAGNOSIS — G71.02 FSHD (FACIOSCAPULOHUMERAL MUSCULAR DYSTROPHY) (CMS/HCC): Primary | ICD-10-CM

## 2021-05-24 DIAGNOSIS — K50.919 CROHN'S DISEASE WITH COMPLICATION, UNSPECIFIED GASTROINTESTINAL TRACT LOCATION (CMS/HCC): ICD-10-CM

## 2021-05-24 DIAGNOSIS — G71.00 MUSCULAR DYSTROPHY (CMS/HCC): ICD-10-CM

## 2021-05-24 DIAGNOSIS — G47.00 INSOMNIA, UNSPECIFIED TYPE: ICD-10-CM

## 2021-05-24 DIAGNOSIS — G82.52 QUADRIPLEGIA, C1-C4 INCOMPLETE (CMS/HCC): ICD-10-CM

## 2021-05-24 DIAGNOSIS — F41.9 ANXIETY: ICD-10-CM

## 2021-05-24 DIAGNOSIS — M25.522 LEFT ELBOW PAIN: ICD-10-CM

## 2021-05-24 DIAGNOSIS — K90.0 CELIAC DISEASE: ICD-10-CM

## 2021-05-24 DIAGNOSIS — Z00.00 PE (PHYSICAL EXAM), ANNUAL: ICD-10-CM

## 2021-05-24 PROCEDURE — 99214 OFFICE O/P EST MOD 30 MIN: CPT | Performed by: INTERNAL MEDICINE

## 2021-05-24 RX ORDER — TRAMADOL HYDROCHLORIDE 50 MG/1
50 TABLET ORAL EVERY 12 HOURS PRN
Qty: 60 TABLET | Refills: 5 | Status: SHIPPED | OUTPATIENT
Start: 2021-05-24 | End: 2021-11-01 | Stop reason: SDUPTHER

## 2021-05-24 RX ORDER — LORAZEPAM 0.5 MG/1
0.5 TABLET ORAL DAILY PRN
Qty: 30 TABLET | Refills: 5 | Status: SHIPPED | OUTPATIENT
Start: 2021-05-24 | End: 2021-11-01 | Stop reason: SDUPTHER

## 2021-05-24 ASSESSMENT — ENCOUNTER SYMPTOMS
COUGH: 0
SHORTNESS OF BREATH: 0
FATIGUE: 0
FEVER: 0
EYE PAIN: 0
WEAKNESS: 0
DIZZINESS: 0
ENDOCRINE NEGATIVE: 1
CHEST TIGHTNESS: 0
APPETITE CHANGE: 0
PALPITATIONS: 0
HEADACHES: 0

## 2021-05-24 NOTE — PROGRESS NOTES
Subjective     Patient ID: Sofia Valente is a 46 y.o. female.    Presents for f/u on chronic conditions-  Paperwork to be completed   SSDI- Feb 2016  Total disability officially dated 8/2015.- ADLs changed at that time-post leg fracture    RUE/shoulder- muscle wasting - oct 2019- current (tendon and shoulder pain)  Loss of  ROm, weakness progressive  LUE-tendon/pain achiness in the shoulder-concern L UE occurrence will be similar to the R -  L elbow prominenece and change in skin color over past month in that area- 2 steroid injections in the elbow over past 9 months third stimulation of joint with injections  R foot -more swelling and redness.           Review of Systems   Constitutional: Positive for activity change. Negative for appetite change, fatigue and fever.   HENT: Negative.    Eyes: Negative for pain and visual disturbance.   Respiratory: Negative for cough, chest tightness and shortness of breath.    Cardiovascular: Negative for chest pain and palpitations.   Endocrine: Negative.    Musculoskeletal: Positive for back pain, gait problem (motor weakness) and myalgias (UE b/l elbows upper arms).   Skin: Positive for color change.   Neurological: Negative for dizziness, weakness and headaches.       Current Outpatient Medications   Medication Sig Dispense Refill   • calcium-vits X7-H-N8-minerals 166.75 mg- 166.75 unit capsule calcium     • cholecalciferol, vitamin D3, 5,000 unit (125 mcg) capsule Take 5,000 Units by mouth daily.     • citalopram (CeleXA) 10 mg tablet Take 1 tablet (10 mg total) by mouth daily. 90 tablet 0   • LORazepam (ATIVAN) 0.5 mg tablet Take 1 tablet (0.5 mg total) by mouth daily as needed for anxiety for up to 15 doses. 30 tablet 5   • multivit with minerals/lutein (MULTIVITAMIN 50 PLUS ORAL) multivitamin     • oxybutynin (DITROPAN) 5 mg tablet Take 1 tablet (5 mg total) by mouth 3 (three) times a day. 270 tablet 1   • traMADoL (ULTRAM) 50 mg tablet Take 1 tablet (50 mg  total) by mouth every 12 (twelve) hours as needed for moderate pain. 60 tablet 5   • lidocaine (LIDODERM) 5 % patch lidocaine 5 % topical patch   APPLY 1 PATCH BY TOPICAL ROUTE ONCE DAILY (MAY WEAR UP TO 12HOURS.) TO RIGHT SHOULDER FOR NEUROPATHIC PAIN     • tavaborole 5 % solution with applicator Apply topically daily. For up to 48 weeks. (Patient not taking: Reported on 5/24/2021 ) 4 mL 1     No current facility-administered medications for this visit.     Past Medical History:   Diagnosis Date   • CD (Crohn's disease) (CMS/[facility])    • Celiac disease    • FSHD (facioscapulohumeral muscular dystrophy) (CMS/[facility])    • Tibial fracture     right tibia transverse fracture   • Urinary incontinence      Family History   Problem Relation Age of Onset   • Celiac disease Biological Mother    • Muscular dystrophy Biological Mother    • Stroke Biological Mother    • No Known Problems Biological Father    • Muscular dystrophy Biological Sister    • Colon cancer Paternal Grandmother    • Lymphoma Paternal Grandfather    • Muscular dystrophy Maternal Grandmother      Past Surgical History:   Procedure Laterality Date   • APPENDECTOMY     • BOWEL RESECTION     • OTHER SURGICAL HISTORY Right     Scapulothoracic fusion      Social History     Socioeconomic History   • Marital status:      Spouse name: Not on file   • Number of children: Not on file   • Years of education: Not on file   • Highest education level: Not on file   Occupational History   • Not on file   Tobacco Use   • Smoking status: Never Smoker   • Smokeless tobacco: Never Used   Substance and Sexual Activity   • Alcohol use: Never   • Drug use: Never   • Sexual activity: Yes     Partners: Male   Other Topics Concern   • Not on file   Social History Narrative   • Not on file     Social Determinants of Health     Financial Resource Strain:    • Difficulty of Paying Living Expenses:    Food Insecurity:    • Worried About Running Out of Food in the Last Year:    •  Ran Out of Food in the Last Year:    Transportation Needs:    • Lack of Transportation (Medical):    • Lack of Transportation (Non-Medical):    Physical Activity:    • Days of Exercise per Week:    • Minutes of Exercise per Session:    Stress:    • Feeling of Stress :    Social Connections:    • Frequency of Communication with Friends and Family:    • Frequency of Social Gatherings with Friends and Family:    • Attends Congregational Services:    • Active Member of Clubs or Organizations:    • Attends Club or Organization Meetings:    • Marital Status:    Intimate Partner Violence:    • Fear of Current or Ex-Partner:    • Emotionally Abused:    • Physically Abused:    • Sexually Abused:      Allergies   Allergen Reactions   • Gluten    • Gluten Protein GI intolerance   • Morphine    • Penicillins GI intolerance       Objective     Vitals:    05/24/21 0908   BP: 110/73   Pulse: 67   Temp: 36.8 °C (98.3 °F)   SpO2: 98%     There is no height or weight on file to calculate BMI.    Physical Exam  Vitals and nursing note reviewed.   Constitutional:       Appearance: She is well-developed.   HENT:      Head: Normocephalic and atraumatic.   Eyes:      Pupils: Pupils are equal, round, and reactive to light.   Cardiovascular:      Rate and Rhythm: Normal rate and regular rhythm.   Pulmonary:      Effort: Pulmonary effort is normal.      Breath sounds: Normal breath sounds.   Musculoskeletal:      Cervical back: Normal range of motion.   Skin:     General: Skin is warm and dry.   Neurological:      Mental Status: She is alert and oriented to person, place, and time.      Motor: Weakness, tremor, atrophy and abnormal muscle tone present.      Gait: Gait abnormal.         Assessment/Plan   Problem List Items Addressed This Visit        Nervous    Quadriplegia, C1-C4 incomplete (CMS/HCC)       Circulatory    Venous insufficiency of right lower extremity     Try to elevate during the day at this time-   Can have u/s done and vascualr  eval if needed.              Digestive    Celiac disease     Diet controlled.  Stable.         Relevant Orders    CBC and differential    Comprehensive metabolic panel    TSH w reflex FT4    Vitamin D 25 hydroxy    CD (Crohn's disease) (CMS/HCC)     Stable at this time          Relevant Orders    CBC and differential    Comprehensive metabolic panel    TSH w reflex FT4    Vitamin D 25 hydroxy       Musculoskeletal    Muscular dystrophy (CMS/HCC)    FSHD (facioscapulohumeral muscular dystrophy) (CMS/HCC) - Primary     Progressive changes-   Worsening RUE weakness and musce wasting  LUE fear for same- with pain nad tendonitis present.  She needs her Left arm to support her R to be able to eat drink.           Relevant Orders    CBC and differential    Comprehensive metabolic panel       Other    Insomnia     Hold on ambien  Use of ativan evenings currently.         Anxiety     Searched [place] PDMP database. No red flags.   Continues using only the ativan at night- sleep has been stable.           Other Visit Diagnoses     Left elbow pain        Relevant Orders    X-RAY ELBOW LEFT 3+ VIEWS    PE (physical exam), annual        Relevant Orders    Lipid panel        Orders Placed This Encounter   Procedures   • X-RAY ELBOW LEFT 3+ VIEWS     Standing Status:   Future     Standing Expiration Date:   5/24/2022     Order Specific Question:   Is the patient pregnant?     Answer:   No     Order Specific Question:   Release to patient     Answer:   Immediate   • CBC and differential     Standing Status:   Future     Standing Expiration Date:   5/24/2022     Order Specific Question:   Release to patient     Answer:   Immediate   • Comprehensive metabolic panel     Standing Status:   Future     Standing Expiration Date:   5/24/2022     Order Specific Question:   Release to patient     Answer:   Immediate   • Lipid panel     Standing Status:   Future     Standing Expiration Date:   5/24/2022     Order Specific Question:   Release to  patient     Answer:   Immediate   • TSH w reflex FT4     Standing Status:   Future     Standing Expiration Date:   5/24/2022     Order Specific Question:   Release to patient     Answer:   Immediate   • Vitamin D 25 hydroxy     Standing Status:   Future     Standing Expiration Date:   5/24/2022     Order Specific Question:   Release to patient     Answer:   Immediate

## 2021-05-24 NOTE — ASSESSMENT & PLAN NOTE
Searched [place] PDMP database. No red flags.   Continues using only the ativan at night- sleep has been stable.

## 2021-05-24 NOTE — ASSESSMENT & PLAN NOTE
Progressive changes-   Worsening RUE weakness and musce wasting  LUE fear for same- with pain nad tendonitis present.  She needs her Left arm to support her R to be able to eat drink.

## 2021-05-27 ASSESSMENT — ENCOUNTER SYMPTOMS
COLOR CHANGE: 1
MYALGIAS: 1
ACTIVITY CHANGE: 1
BACK PAIN: 1

## 2021-06-02 NOTE — TELEPHONE ENCOUNTER
Medicine Refill Request    Last Telemedicine Visit: 3/17/2021 Devora Todd MD    Next Telemedicine Visit: Needs to schedule        Current Outpatient Medications:   •  calcium-vits N9-E-T4-minerals 166.75 mg- 166.75 unit capsule, calcium, Disp: , Rfl:   •  cholecalciferol, vitamin D3, 5,000 unit (125 mcg) capsule, Take 5,000 Units by mouth daily., Disp: , Rfl:   •  citalopram (CeleXA) 10 mg tablet, Take 1 tablet (10 mg total) by mouth daily., Disp: 90 tablet, Rfl: 0  •  lidocaine (LIDODERM) 5 % patch, lidocaine 5 % topical patch  APPLY 1 PATCH BY TOPICAL ROUTE ONCE DAILY (MAY WEAR UP TO 12HOURS.) TO RIGHT SHOULDER FOR NEUROPATHIC PAIN, Disp: , Rfl:   •  LORazepam (ATIVAN) 0.5 mg tablet, Take 1 tablet (0.5 mg total) by mouth daily as needed for anxiety for up to 15 doses., Disp: 30 tablet, Rfl: 5  •  multivit with minerals/lutein (MULTIVITAMIN 50 PLUS ORAL), multivitamin, Disp: , Rfl:   •  oxybutynin (DITROPAN) 5 mg tablet, Take 1 tablet (5 mg total) by mouth 3 (three) times a day., Disp: 270 tablet, Rfl: 1  •  tavaborole 5 % solution with applicator, Apply topically daily. For up to 48 weeks. (Patient not taking: Reported on 5/24/2021 ), Disp: 4 mL, Rfl: 1  •  traMADoL (ULTRAM) 50 mg tablet, Take 1 tablet (50 mg total) by mouth every 12 (twelve) hours as needed for moderate pain., Disp: 60 tablet, Rfl: 5      BP Readings from Last 3 Encounters:   05/24/21 110/73   12/10/20 118/79   11/17/20 120/72       Recent Lab results:  No results found for: CHOL, No results found for: HDL, No results found for: LDLCALC, No results found for: TRIG     Lab Results   Component Value Date    GLUCOSE 94 12/07/2020   , No results found for: HGBA1C      Lab Results   Component Value Date    CREATININE <0.3 (L) 12/07/2020       Lab Results   Component Value Date    TSH 1.17 09/24/2020      Patient sent electronic request for refill. Uses mail order, 90 day supply. RN sent message through portal for patient to schedule f/u med  check.

## 2021-06-03 RX ORDER — CITALOPRAM 10 MG/1
10 TABLET ORAL DAILY
Qty: 90 TABLET | Refills: 0 | Status: SHIPPED | OUTPATIENT
Start: 2021-06-03 | End: 2021-09-07 | Stop reason: SDUPTHER

## 2021-06-03 NOTE — TELEPHONE ENCOUNTER
Covering for Dr. Todd. Last med note reviewed. Routed refill for citalopram 10mg qd to pharmacy on file.

## 2021-06-14 ENCOUNTER — HOSPITAL ENCOUNTER (OUTPATIENT)
Dept: RADIOLOGY | Facility: CLINIC | Age: 46
Discharge: HOME | End: 2021-06-14
Attending: INTERNAL MEDICINE
Payer: COMMERCIAL

## 2021-06-14 DIAGNOSIS — M25.522 LEFT ELBOW PAIN: ICD-10-CM

## 2021-06-14 PROCEDURE — 73080 X-RAY EXAM OF ELBOW: CPT | Mod: LT

## 2021-09-07 ENCOUNTER — TELEPHONE (OUTPATIENT)
Dept: PSYCHIATRY | Facility: HOSPITAL | Age: 46
End: 2021-09-07

## 2021-09-07 RX ORDER — CITALOPRAM 10 MG/1
10 TABLET ORAL DAILY
Qty: 90 TABLET | Refills: 0 | Status: SHIPPED | OUTPATIENT
Start: 2021-09-07 | End: 2021-09-28 | Stop reason: SDUPTHER

## 2021-09-07 NOTE — TELEPHONE ENCOUNTER
Covering for Dr. Todd. Completed refill request, indicated on script that patient must attend office visit for further refills.    Jenny Lindsey MD

## 2021-09-07 NOTE — TELEPHONE ENCOUNTER
Refill needed for Celexa 10mg.needed.  Has upcoming appt on 9/28 with Dr. Todd but will run out of meds.  Please call [facility] for 90 day refill at [phone].  Prescription #[id number]

## 2021-09-28 ENCOUNTER — TELEMEDICINE (OUTPATIENT)
Dept: PSYCHIATRY | Facility: HOSPITAL | Age: 46
End: 2021-09-28
Attending: PSYCHIATRY & NEUROLOGY
Payer: COMMERCIAL

## 2021-09-28 DIAGNOSIS — F41.1 GAD (GENERALIZED ANXIETY DISORDER): ICD-10-CM

## 2021-09-28 DIAGNOSIS — F33.1 MAJOR DEPRESSIVE DISORDER, RECURRENT EPISODE, MODERATE (CMS/HCC): Primary | ICD-10-CM

## 2021-09-28 PROCEDURE — 99214 OFFICE O/P EST MOD 30 MIN: CPT | Mod: 95 | Performed by: PSYCHIATRY & NEUROLOGY

## 2021-09-28 RX ORDER — CITALOPRAM 10 MG/1
10 TABLET ORAL DAILY
Qty: 90 TABLET | Refills: 0 | Status: SHIPPED | OUTPATIENT
Start: 2021-09-28 | End: 2021-09-28 | Stop reason: SDUPTHER

## 2021-09-28 RX ORDER — CITALOPRAM 10 MG/1
10 TABLET ORAL DAILY
Qty: 90 TABLET | Refills: 0 | Status: SHIPPED | OUTPATIENT
Start: 2021-09-28 | End: 2022-01-25 | Stop reason: SDUPTHER

## 2021-09-28 NOTE — PROGRESS NOTES
"Sofia Valente is a 46 y.o. female who presents for Med Management and Follow-up.      Telemedicine Service  This visit occurred via telemedicine services with the consent of the patient.  Patient location: [place] [facility]  [facility]  Those who participated in the encounter: Patient, Provider, medical student  Able to reach patient at : [phone]     Identified patient by name and birthdate, introduced myself and location, confirmed patient's location and private setting.  The details regarding Zoom platform including letting patient know the video conference platform is private confidential secure and real-time.  The conversation is not being recorded.  No other participants in the video conference beyond noted above.  Informed that a summary of our appointment would be entered into the medical record and [facility] with bill for the session through telemedicine billing codes.  Patient informed of right to choose form of delivery service, including right to refuse video session.  Patient consents to behavioral health treatment    \"Overall doing okay.\"    Throughout spring and summer patient was primarily at her parents' home in [facility] with care needs following her mother's stroke February 2021.  Patient returned from her parents' home early September.  Patient and her sister were able to go on a cruise together.  Patient reports it was relaxing and overall very refreshing.      Looking into assisted living for her parents as a backup plan given their increased care needs.    Patient states overall she has been managing though reports recently pain has been increased, tramadol 2 times a day to manage pain.  Discussed potential additive effect of CNS sedation with tramadol in combination with benzodiazepine.        Sleep - \"enough\" taking lorazapam every night with benefit, denies morning sedation.  PCP prescribing.  No evidence of misuse.    Periods of down but able to maintain " routine, self-care and enjoy time with friends and family intermittently.  Patient reports  has been more supportive than usual over the last 6 months.  Libido - down  Interests - enjoys being back at home  Appetite- good   Therapist Dorene England LCSW  - once a month      ETOH -few alcoholic beverages while on the cruise, while home has returned to typical routine of no alcohol   THC - denies  Caffeine - 1 per day        Psychiatric ROS:   Sleep:Good and Fair  Appetite: Good  Libido: Fair  Exercise: Limited by pain  ETOH/Substances:see above     Medical Review of Systems  Review of Systems    PMSH: No changes were made to non-psychiatric medications/allergies/medical history.     Risk/Benefit/side Effects discussed regarding the following medications:  See above  PDMP Queried: Yes    Allergies:   Allergies   Allergen Reactions   • Gluten    • Gluten Protein GI intolerance   • Morphine    • Penicillins GI intolerance       Current Outpatient Medications:   •  calcium-vits A5-M-Z3-minerals 166.75 mg- 166.75 unit capsule, calcium, , Disp: , Rfl:   •  cholecalciferol, vitamin D3, 5,000 unit (125 mcg) capsule, Take 5,000 Units by mouth daily., , Disp: , Rfl:   •  citalopram (CeleXA) 10 mg tablet, Take 1 tablet (10 mg total) by mouth daily., , Disp: 90 tablet, Rfl: 0  •  lidocaine (LIDODERM) 5 % patch, lidocaine 5 % topical patch  APPLY 1 PATCH BY TOPICAL ROUTE ONCE DAILY (MAY WEAR UP TO 12HOURS.) TO RIGHT SHOULDER FOR NEUROPATHIC PAIN, , Disp: , Rfl:   •  LORazepam (ATIVAN) 0.5 mg tablet, Take 1 tablet (0.5 mg total) by mouth daily as needed for anxiety for up to 15 doses., , Disp: 30 tablet, Rfl: 5  •  multivit with minerals/lutein (MULTIVITAMIN 50 PLUS ORAL), multivitamin, , Disp: , Rfl:   •  oxybutynin (DITROPAN) 5 mg tablet, Take 1 tablet (5 mg total) by mouth 3 (three) times a day., , Disp: 270 tablet, Rfl: 1  •  tavaborole 5 % solution with applicator, Apply topically daily. For up to 48 weeks. (Patient  not taking: Reported on 5/24/2021 ), , Disp: 4 mL, Rfl: 1  •  traMADoL (ULTRAM) 50 mg tablet, Take 1 tablet (50 mg total) by mouth every 12 (twelve) hours as needed for moderate pain., , Disp: 60 tablet, Rfl: 5         Objective     Physical Exam    There were no vitals taken for this visit.    MENTAL STATUS EXAM  Appearance: well groomed  Gait and Motor: slow  Speech: slowed  Mood: depressed and anxious  Affect: normal  Associations: coherent  Thought Process: goal-directed  Thought Content: no auditory or visual hallucinations. and appropriate to situation  Suicidality/Homicidality: denies  Judgement/Insight: good  Orientation: day, month and year  Memory: recalls recent events and recalls remote events  Attention: alert  Knowledge: normal  Language: normal        [place] Suicide Severity Rating Scale:  Not done today     Safe-T Assessment:  Not done today         Brief Psychiatric Formulation: Patient is a 46-year-old  G0, P0 female with history of FSHD (facioscapulohumeral muscular dystrophy), celiac disease,Crohn's disease, arthritis, chronic pain status post bone stimulator removal with mood and anxiety symptoms consistent with MDD recurrent moderate and PARISA. No dangerousness to self or others.  Strengths include intelligence, good family support.     Patient seen for PEV 9/9/2020, trial of Prozac with significant activation and inability to tolerate after only several doses, prozac discontinued.  Trial of citalopram initiated mid September 2020, increased citalopram to 10 mg  March 2021. Patient has tolerated and benefited from same.  Zolpidem prescribed by PCP.  April, sleep broken, Zolpidem discontinued and lorazepam adjusted to qhs to target sleep. Consider gabapentin augmentation in the future.    Interval history significant for ongoing external stressors with overall reasonable control of mood and anxiety.  Tolerating citalopram 10 mg.  Benefiting from lorazepam target sleep.  Sedation,  cognitive slowing.  No dangerousness to self or others.  Maintaining cautious dosing of citalopram due to activation experienced with Prozac          Plan:  Continue citalopram 10 mg p.o. daily, trial initiated September 2020, last increased March 2021 history of sensitivity to activation with trial of Prozac, updated [facility]opram [facility] as current script through end of December will run out before next appointment.  Continue lorazepam 0.5 mg p.o. daily as needed, adjusted to nightly as needed, zolpidem discontinued, patient has lorazapam as prescribed by PCP no misuse  Continue vitamin D replacement, follow-up with PCP as scheduled  Continue follow-up with therapist Dorene England LCSW  Follow-up med check 8 - 12 weeks  Based on [place] Suicide Screen and current clinical assessment, patient is determined Low Risk.  Suicide Risk/Suicidal Ideation will not be added to patient's treatment plan.     Patient to F/U with treatment goals as outlined in treatment plan.  Patient to F/U with local ED or call 911 should SI/HI arise.    Visit Diagnosis:    ICD-10-CM ICD-9-CM   1. Major depressive disorder, recurrent episode, moderate (CMS/HCC)  F33.1 296.32   2. PARISA (generalized anxiety disorder)  F41.1 300.02     9:40 - 10:00  Duration:  20 minutes  Devora Todd MD @ 11:07 AM

## 2021-10-12 ENCOUNTER — TRANSCRIBE ORDERS (OUTPATIENT)
Dept: SCHEDULING | Age: 46
End: 2021-10-12
Payer: COMMERCIAL

## 2021-10-12 DIAGNOSIS — M25.522 PAIN IN LEFT ELBOW: Primary | ICD-10-CM

## 2021-10-14 ENCOUNTER — HOSPITAL ENCOUNTER (OUTPATIENT)
Dept: RADIOLOGY | Facility: HOSPITAL | Age: 46
Discharge: HOME | End: 2021-10-14
Attending: PHYSICIAN ASSISTANT
Payer: COMMERCIAL

## 2021-10-14 DIAGNOSIS — M25.522 PAIN IN LEFT ELBOW: ICD-10-CM

## 2021-10-22 ENCOUNTER — TELEPHONE (OUTPATIENT)
Dept: PRIMARY CARE | Facility: CLINIC | Age: 46
End: 2021-10-22

## 2021-10-22 NOTE — TELEPHONE ENCOUNTER
Patients Chief Complaint: burning, urge to urinate, small amount of urine    Symptom Onset and Duration: Monday 10/18/2021      Comments:       Did you attempt any home management? cleansing      System Generated Preferred Pharmacy(s)  are as follows.  If more than one pharmacy displays please identify which one should be used for this call    Has the pharmacy information below been confirmed with the patient?  Yes      [id number] [address]  [address] [address]  Phone: [phone] Fax: [phone]           If necessary, provide pharmacy details below.     Is this pharmacy a mail order pharmacy?:   Pharmacy Name:   Pharmacy City:   Pharmacy Telephone:          Next Encounter with this provider: 11/1/2021

## 2021-10-25 ENCOUNTER — APPOINTMENT (OUTPATIENT)
Dept: LAB | Age: 46
End: 2021-10-25
Attending: INTERNAL MEDICINE
Payer: COMMERCIAL

## 2021-10-25 DIAGNOSIS — N39.0 URINARY TRACT INFECTION WITHOUT HEMATURIA, SITE UNSPECIFIED: ICD-10-CM

## 2021-10-25 DIAGNOSIS — N39.0 URINARY TRACT INFECTION WITHOUT HEMATURIA, SITE UNSPECIFIED: Primary | ICD-10-CM

## 2021-10-25 LAB
BILIRUB UR QL STRIP.AUTO: NEGATIVE MG/DL
CLARITY UR REFRACT.AUTO: CLEAR
COLOR UR AUTO: YELLOW
GLUCOSE UR STRIP.AUTO-MCNC: NEGATIVE MG/DL
HGB UR QL STRIP.AUTO: NEGATIVE
KETONES UR STRIP.AUTO-MCNC: NEGATIVE MG/DL
LEUKOCYTE ESTERASE UR QL STRIP.AUTO: NEGATIVE
NITRITE UR QL STRIP.AUTO: NEGATIVE
PH UR STRIP.AUTO: 6 [PH]
PROT UR QL STRIP.AUTO: NEGATIVE
SP GR UR REFRACT.AUTO: 1.01
UROBILINOGEN UR STRIP-ACNC: 0.2 EU/DL

## 2021-10-25 PROCEDURE — 81003 URINALYSIS AUTO W/O SCOPE: CPT

## 2021-11-01 ENCOUNTER — OFFICE VISIT (OUTPATIENT)
Dept: PRIMARY CARE | Facility: CLINIC | Age: 46
End: 2021-11-01
Payer: COMMERCIAL

## 2021-11-01 VITALS — TEMPERATURE: 98.2 F

## 2021-11-01 DIAGNOSIS — Z23 FLU VACCINE NEED: ICD-10-CM

## 2021-11-01 DIAGNOSIS — G82.52 QUADRIPLEGIA, C1-C4 INCOMPLETE (CMS/HCC): ICD-10-CM

## 2021-11-01 DIAGNOSIS — K50.919 CROHN'S DISEASE WITH COMPLICATION, UNSPECIFIED GASTROINTESTINAL TRACT LOCATION (CMS/HCC): ICD-10-CM

## 2021-11-01 DIAGNOSIS — G47.00 INSOMNIA, UNSPECIFIED TYPE: ICD-10-CM

## 2021-11-01 DIAGNOSIS — G71.02 FSHD (FACIOSCAPULOHUMERAL MUSCULAR DYSTROPHY) (CMS/HCC): Primary | ICD-10-CM

## 2021-11-01 DIAGNOSIS — M25.522 LEFT ELBOW PAIN: ICD-10-CM

## 2021-11-01 PROBLEM — G71.00 MUSCULAR DYSTROPHY (CMS/HCC): Status: RESOLVED | Noted: 2020-05-01 | Resolved: 2021-11-01

## 2021-11-01 PROCEDURE — 90686 IIV4 VACC NO PRSV 0.5 ML IM: CPT | Performed by: INTERNAL MEDICINE

## 2021-11-01 PROCEDURE — 99214 OFFICE O/P EST MOD 30 MIN: CPT | Mod: 25 | Performed by: INTERNAL MEDICINE

## 2021-11-01 PROCEDURE — 90471 IMMUNIZATION ADMIN: CPT | Performed by: INTERNAL MEDICINE

## 2021-11-01 RX ORDER — LORAZEPAM 0.5 MG/1
0.5 TABLET ORAL DAILY PRN
Qty: 30 TABLET | Refills: 5 | Status: SHIPPED | OUTPATIENT
Start: 2021-11-01 | End: 2021-12-09 | Stop reason: SDUPTHER

## 2021-11-01 RX ORDER — TRAMADOL HYDROCHLORIDE 50 MG/1
50 TABLET ORAL EVERY 6 HOURS PRN
Qty: 120 TABLET | Refills: 0 | Status: SHIPPED | OUTPATIENT
Start: 2021-11-01 | End: 2021-12-09 | Stop reason: SDUPTHER

## 2021-11-01 ASSESSMENT — ENCOUNTER SYMPTOMS
HEADACHES: 0
ENDOCRINE NEGATIVE: 1
ACTIVITY CHANGE: 0
SHORTNESS OF BREATH: 0
WEAKNESS: 1
FEVER: 0
JOINT SWELLING: 1
FATIGUE: 0
APPETITE CHANGE: 0
MYALGIAS: 1
PALPITATIONS: 0
DIZZINESS: 0
COUGH: 0
CHEST TIGHTNESS: 0
EYE PAIN: 0

## 2021-11-01 NOTE — PROGRESS NOTES
Subjective     Patient ID: Sofia Valente is a 46 y.o. female.    L arm pain-elbow lump  MRI done proved out to be tendon tears  PRP procedure done from ortho- 2 weeks ago today.  Anticipated 4 weeks for healing- but concerned with pain that has not started to improve.    Taking one extra tramadol before bed-            Review of Systems   Constitutional: Negative for activity change, appetite change, fatigue and fever.   HENT: Negative.    Eyes: Negative for pain and visual disturbance.   Respiratory: Negative for cough, chest tightness and shortness of breath.    Cardiovascular: Negative for chest pain and palpitations.   Endocrine: Negative.    Musculoskeletal: Positive for gait problem (CHRONIC), joint swelling (L elbow -improving) and myalgias (chronic L elbow).   Neurological: Positive for weakness. Negative for dizziness and headaches.       Current Outpatient Medications   Medication Sig Dispense Refill   • cholecalciferol, vitamin D3, 5,000 unit (125 mcg) capsule Take 5,000 Units by mouth daily.     • citalopram (CeleXA) 10 mg tablet Take 1 tablet (10 mg total) by mouth daily. 90 tablet 0   • LORazepam (ATIVAN) 0.5 mg tablet Take 1 tablet (0.5 mg total) by mouth daily as needed for anxiety for up to 15 doses. 30 tablet 5   • multivit with minerals/lutein (MULTIVITAMIN 50 PLUS ORAL) multivitamin     • oxybutynin (DITROPAN) 5 mg tablet Take 1 tablet (5 mg total) by mouth 3 (three) times a day. 270 tablet 1   • traMADoL 50 mg tablet Take 1 tablet (50 mg total) by mouth every 6 (six) hours as needed for moderate pain. 120 tablet 0   • calcium-vits Y1-L-L0-minerals 166.75 mg- 166.75 unit capsule calcium     • lidocaine (LIDODERM) 5 % patch lidocaine 5 % topical patch   APPLY 1 PATCH BY TOPICAL ROUTE ONCE DAILY (MAY WEAR UP TO 12HOURS.) TO RIGHT SHOULDER FOR NEUROPATHIC PAIN       No current facility-administered medications for this visit.     Past Medical History:   Diagnosis Date   • CD (Crohn's  disease) (CMS/HCC)    • Celiac disease    • FSHD (facioscapulohumeral muscular dystrophy) (CMS/HCC)    • Tibial fracture     right tibia transverse fracture   • Urinary incontinence      Family History   Problem Relation Age of Onset   • Celiac disease Biological Mother    • Muscular dystrophy Biological Mother    • Stroke Biological Mother    • No Known Problems Biological Father    • Muscular dystrophy Biological Sister    • Colon cancer Paternal Grandmother    • Lymphoma Paternal Grandfather    • Muscular dystrophy Maternal Grandmother      Past Surgical History:   Procedure Laterality Date   • APPENDECTOMY     • BOWEL RESECTION     • OTHER SURGICAL HISTORY Right     Scapulothoracic fusion      Social History     Socioeconomic History   • Marital status:      Spouse name: Not on file   • Number of children: Not on file   • Years of education: Not on file   • Highest education level: Not on file   Occupational History   • Not on file   Tobacco Use   • Smoking status: Never Smoker   • Smokeless tobacco: Never Used   Substance and Sexual Activity   • Alcohol use: Never   • Drug use: Never   • Sexual activity: Yes     Partners: Male   Other Topics Concern   • Not on file   Social History Narrative   • Not on file     Social Determinants of Health     Financial Resource Strain:    • Difficulty of Paying Living Expenses: Not on file   Food Insecurity:    • Worried About Running Out of Food in the Last Year: Not on file   • Ran Out of Food in the Last Year: Not on file   Transportation Needs:    • Lack of Transportation (Medical): Not on file   • Lack of Transportation (Non-Medical): Not on file   Physical Activity:    • Days of Exercise per Week: Not on file   • Minutes of Exercise per Session: Not on file   Stress:    • Feeling of Stress : Not on file   Social Connections:    • Frequency of Communication with Friends and Family: Not on file   • Frequency of Social Gatherings with Friends and Family: Not on file    • Attends Gnosticist Services: Not on file   • Active Member of Clubs or Organizations: Not on file   • Attends Club or Organization Meetings: Not on file   • Marital Status: Not on file   Intimate Partner Violence:    • Fear of Current or Ex-Partner: Not on file   • Emotionally Abused: Not on file   • Physically Abused: Not on file   • Sexually Abused: Not on file   Housing Stability:    • Unable to Pay for Housing in the Last Year: Not on file   • Number of Places Lived in the Last Year: Not on file   • Unstable Housing in the Last Year: Not on file     Allergies   Allergen Reactions   • Gluten    • Gluten Protein GI intolerance   • Morphine    • Penicillins GI intolerance       Objective     Vitals:    11/01/21 1007   Temp: 36.8 °C (98.2 °F)     There is no height or weight on file to calculate BMI.    Physical Exam  Vitals and nursing note reviewed.   Constitutional:       Appearance: She is well-developed.   HENT:      Head: Normocephalic and atraumatic.   Eyes:      Pupils: Pupils are equal, round, and reactive to light.   Cardiovascular:      Rate and Rhythm: Normal rate and regular rhythm.   Pulmonary:      Effort: Pulmonary effort is normal.      Breath sounds: Normal breath sounds.   Musculoskeletal:         General: Swelling (L elbow) and tenderness present.   Skin:     General: Skin is warm and dry.   Neurological:      Mental Status: She is alert and oriented to person, place, and time.         Assessment/Plan   Problem List Items Addressed This Visit        Nervous    Quadriplegia, C1-C4 incomplete (CMS/HCC)    Left elbow pain     Post PRP treatment sever pain- so will increase temporarily  Her tramadol to 100mg q12- and may need additonal 100mg dose duing the day.   New rx sent.  Searched [place] PDMP database. No red flags.               Digestive    CD (Crohn's disease) (CMS/HCC)     Diet controlled. Stable.            Musculoskeletal    FSHD (facioscapulohumeral muscular dystrophy) (CMS/HCC) - Primary        Other    Insomnia     Of of ambien for now- may need intermittent use depending upon situation.  Right now only lorazepam as directed.  Searched [place] PDMP database. No red flags.            Flu vaccine need    Relevant Orders    Influenza vaccine quadrivalent preservative free 6 mon and older IM (FluLaval) (Completed)        Orders Placed This Encounter   Procedures   • Influenza vaccine quadrivalent preservative free 6 mon and older IM (FluLaval)

## 2021-11-01 NOTE — ASSESSMENT & PLAN NOTE
Of of ambien for now- may need intermittent use depending upon situation.  Right now only lorazepam as directed.  Searched [place] PDMP database. No red flags.

## 2021-11-01 NOTE — ASSESSMENT & PLAN NOTE
Post PRP treatment sever pain- so will increase temporarily  Her tramadol to 100mg q12- and may need additonal 100mg dose duing the day.   New rx sent.  Searched [place] PDMP database. No red flags.

## 2021-11-23 RX ORDER — OXYBUTYNIN CHLORIDE 5 MG/1
TABLET ORAL
Qty: 270 TABLET | Refills: 1 | Status: SHIPPED | OUTPATIENT
Start: 2021-11-23 | End: 2023-02-08 | Stop reason: SDUPTHER

## 2021-12-09 RX ORDER — TRAMADOL HYDROCHLORIDE 50 MG/1
50 TABLET ORAL EVERY 6 HOURS PRN
Qty: 120 TABLET | Refills: 0 | Status: SHIPPED | OUTPATIENT
Start: 2021-12-09 | End: 2022-01-11

## 2021-12-09 RX ORDER — LORAZEPAM 0.5 MG/1
0.5 TABLET ORAL DAILY PRN
Qty: 30 TABLET | Refills: 5 | Status: SHIPPED | OUTPATIENT
Start: 2021-12-09 | End: 2022-04-12 | Stop reason: SDUPTHER

## 2021-12-09 NOTE — TELEPHONE ENCOUNTER
Last OV 11/1/21  Tramadol Filled on 11/1/21 #120/30  Ativan filled on 11/5/21 #30  Pdmp Checked with No Red Flags

## 2022-01-11 RX ORDER — TRAMADOL HYDROCHLORIDE 50 MG/1
TABLET ORAL
Qty: 120 TABLET | Refills: 0 | Status: SHIPPED | OUTPATIENT
Start: 2022-01-11 | End: 2022-02-07

## 2022-01-24 ENCOUNTER — TELEPHONE (OUTPATIENT)
Dept: PSYCHIATRY | Facility: HOSPITAL | Age: 47
End: 2022-01-24
Payer: COMMERCIAL

## 2022-01-24 NOTE — TELEPHONE ENCOUNTER
Pt called med line requesting refill- Pt stated [facility] is waiting for approval from our office. Pt didn't state which medication in the message.

## 2022-01-25 RX ORDER — CITALOPRAM 10 MG/1
10 TABLET ORAL DAILY
Qty: 90 TABLET | Refills: 0 | Status: SHIPPED | OUTPATIENT
Start: 2022-01-25 | End: 2022-02-23 | Stop reason: SDUPTHER

## 2022-01-25 NOTE — TELEPHONE ENCOUNTER
Medicine Refill Request    Last Office: Visit date not found   Last Consult Visit: Visit date not found  Last Telemedicine Visit: 9/28/2021 Devora Todd MD    Next Appointment: 2/23/2022      Current Outpatient Medications:   •  oxybutynin 5 mg tablet, TAKE 1 TABLET(5 MG) BY MOUTH THREE TIMES DAILY, Disp: 270 tablet, Rfl: 1  •  traMADoL 50 mg tablet, TAKE 1 TABLET(50 MG) BY MOUTH EVERY 6 HOURS AS NEEDED FOR MODERATE PAIN, Disp: 120 tablet, Rfl: 0  •  cholecalciferol, vitamin D3, 5,000 unit (125 mcg) capsule, Take 5,000 Units by mouth daily., Disp: , Rfl:   •  citalopram (CeleXA) 10 mg tablet, Take 1 tablet (10 mg total) by mouth daily., Disp: 90 tablet, Rfl: 0  •  lidocaine (LIDODERM) 5 % patch, lidocaine 5 % topical patch  APPLY 1 PATCH BY TOPICAL ROUTE ONCE DAILY (MAY WEAR UP TO 12HOURS.) TO RIGHT SHOULDER FOR NEUROPATHIC PAIN, Disp: , Rfl:   •  LORazepam (ATIVAN) 0.5 mg tablet, Take 1 tablet (0.5 mg total) by mouth daily as needed for anxiety for up to 15 doses., Disp: 30 tablet, Rfl: 5  •  multivit with minerals/lutein (MULTIVITAMIN 50 PLUS ORAL), multivitamin, Disp: , Rfl:       BP Readings from Last 3 Encounters:   05/24/21 110/73   12/10/20 118/79   11/17/20 120/72       Chart reviewed, 90-day refill sent to pharmacy on file.  Patient has not been seen for follow-up visit since 9/28/2021.  No further refills without follow-up visit

## 2022-02-07 RX ORDER — TRAMADOL HYDROCHLORIDE 50 MG/1
TABLET ORAL
Qty: 120 TABLET | Refills: 0 | Status: SHIPPED | OUTPATIENT
Start: 2022-02-10 | End: 2022-03-15

## 2022-02-07 NOTE — TELEPHONE ENCOUNTER
Please call to schedule med check for May. Signed med agreement to be seen every 6 months. Last OV 11/1/21    Last OV 11/1/21  Filled on 1/11/22  #120/30  Pdmp Checked - Patient Receiving Potentially Dangerous Drug Combination     Refill due 2/10/22

## 2022-02-23 ENCOUNTER — TELEMEDICINE (OUTPATIENT)
Dept: PSYCHIATRY | Facility: HOSPITAL | Age: 47
End: 2022-02-23
Attending: PSYCHIATRY & NEUROLOGY
Payer: COMMERCIAL

## 2022-02-23 DIAGNOSIS — F41.1 GAD (GENERALIZED ANXIETY DISORDER): ICD-10-CM

## 2022-02-23 DIAGNOSIS — F33.1 MAJOR DEPRESSIVE DISORDER, RECURRENT EPISODE, MODERATE (CMS/HCC): Primary | ICD-10-CM

## 2022-02-23 PROCEDURE — 99213 OFFICE O/P EST LOW 20 MIN: CPT | Mod: 95 | Performed by: PSYCHIATRY & NEUROLOGY

## 2022-02-23 RX ORDER — CITALOPRAM 10 MG/1
10 TABLET ORAL DAILY
Qty: 90 TABLET | Refills: 0 | Status: SHIPPED | OUTPATIENT
Start: 2022-02-23 | End: 2022-06-01 | Stop reason: SDUPTHER

## 2022-02-23 NOTE — PROGRESS NOTES
"Sofia Valente is a 47 y.o. female who presents for Follow-up and Med Management.       Telemedicine Service  This visit occurred via telemedicine services with the consent of the patient.  Patient location: [place]  Provider location: [facility] [place]  Those who participated in the encounter: Patient, Provider, medical student  Able to reach patient at : [phone]     Identified patient by name and birthdate, introduced myself and location, confirmed patient's location and private setting.  The details regarding Zoom platform including letting patient know the video conference platform is private confidential secure and real-time.  The conversation is not being recorded.  No other participants in the video conference beyond noted above.  Informed that a summary of our appointment would be entered into the medical record and [facility] health with bill for the session through telemedicine billing codes.  Patient informed of right to choose form of delivery service, including right to refuse video session.  Patient consents to behavioral health treatment    \"I am OK.\"    Away with   Some accessibility with recent cruise     started a new job, he will [employer] [facility] [employer] primarily.    Ongoing elbow pain- tendinosis, torn ligaments, would lose use of that arm for 6 weeks, would lose a lot of mobility so a hard decision.      Continues to keep self busy  Out with caregivers provides distraction.    Lorazepam has been very helpful  Celexa - not much noticeable improvement, but overall less sad. Most vulnerable prior to menses  Overall more good days  No SI  Tearful for a week in the fall so it was longer than usual.  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     Risk/Benefit/side Effects discussed regarding the following medications:  See above  PDMP Queried: Yes    Allergies:   Allergies   Allergen Reactions   • Gluten    • Gluten Protein GI intolerance   • Morphine    • Penicillins GI intolerance       Current Outpatient Medications:   •  oxybutynin 5 mg tablet, TAKE 1 TABLET(5 MG) BY MOUTH THREE TIMES DAILY, , Disp: 270 tablet, Rfl: 1  •  traMADoL (ULTRAM) 50 mg tablet, TAKE 1 TABLET(50 MG) BY MOUTH EVERY 6 HOURS AS NEEDED FOR MODERATE PAIN, , Disp: 120 tablet, Rfl: 0  •  cholecalciferol, vitamin D3, 5,000 unit (125 mcg) capsule, Take 5,000 Units by mouth daily., , Disp: , Rfl:   •  citalopram (CeleXA) 10 mg tablet, Take 1 tablet (10 mg total) by mouth daily., , Disp: 90 tablet, Rfl: 0  •  lidocaine (LIDODERM) 5 % patch, lidocaine 5 % topical patch  APPLY 1 PATCH BY TOPICAL ROUTE ONCE DAILY (MAY WEAR UP TO 12HOURS.) TO RIGHT SHOULDER FOR NEUROPATHIC PAIN, , Disp: , Rfl:   •  LORazepam (ATIVAN) 0.5 mg tablet, Take 1 tablet (0.5 mg total) by mouth daily as needed for anxiety for up to 15 doses., , Disp: 30 tablet, Rfl: 5  •  multivit with minerals/lutein (MULTIVITAMIN 50 PLUS ORAL), multivitamin, , Disp: , Rfl:          Objective     Physical Exam    There were no vitals taken for this visit.    MENTAL STATUS EXAM  Appearance: well groomed  Gait and Motor: slow  Speech: normal rate/rhythm/volume and fluent  Mood: 'okay'  Affect: normal  Associations: coherent  Thought Process: goal-directed  Thought Content: no auditory or visual hallucinations. and appropriate to situation  Suicidality/Homicidality: denies  Judgement/Insight: good  Orientation: day, month and year  Memory: recalls recent  events and recalls remote events  Attention: alert  Knowledge: normal  Language: normal        [place] Suicide Severity Rating Scale:  Not indicated      Safe-T Assessment:  Not indicated            Brief Psychiatric Formulation: Brief Psychiatric Formulation: Patient is a 46-year-old  G0, P0 female with history of FSHD (facioscapulohumeral muscular dystrophy), celiac disease,Crohn's disease, arthritis, chronic pain status post bone stimulator removal with mood and anxiety symptoms consistent with MDD recurrent moderate and PARISA. No dangerousness to self or others.  Strengths include intelligence, good family support.     Patient seen for PEV 9/9/2020, trial of Prozac with significant activation and inability to tolerate after only several doses, prozac discontinued.  Trial of citalopram 5mg initiated mid September 2020, increased citalopram to 10 mg  March 2021. Patient has tolerated and benefited from same.  Zolpidem prescribed by PCP.  April, sleep broken, Zolpidem discontinued and lorazepam adjusted to qhs to target sleep. Consider gabapentin augmentation in the future.    Interval history with period of transient low mood and tearfulness, spontaneously remitted.  Remains vulnerable to mood symptoms around menses.  Overall functioning across domains at high level.  No dangerousness to self or others.  Tolerating and benefiting from citalopram 10mg.  Discussed benefits of continuing current dose with potential to decrease back to 5 mg spring/summer 2022 due to sexual side effects.     Plan:  Continue citalopram 10 mg p.o. daily, trial initiated September 2020, last increased March 2021, history of sensitivity to activation with trial of Prozac  Continue lorazepam 0.5 mg p.o. daily as needed, adjusted to nightly as needed, zolpidem discontinued, patient has lorazapam as prescribed by PCP no misuse  Continue vitamin D replacement, follow-up with PCP as scheduled  Continue follow-up with therapist Dorene  FRANNY England  Follow-up med check 16 weeks    Based on [place] Suicide Screen and current clinical assessment, patient is determined Low Risk.  Suicide Risk/Suicidal Ideation will not be added to patient's treatment plan.     Patient to F/U with treatment goals as outlined in treatment plan.  Patient to F/U with local ED or call 911 should SI/HI arise.    Visit Diagnosis:    ICD-10-CM ICD-9-CM   1. Major depressive disorder, recurrent episode, moderate (CMS/HCC)  F33.1 296.32   2. PARISA (generalized anxiety disorder)  F41.1 300.02     11:02 - 11:18  Duration:  16 min  Devora Todd MD @ 1:40 PM

## 2022-03-15 RX ORDER — TRAMADOL HYDROCHLORIDE 50 MG/1
TABLET ORAL
Qty: 120 TABLET | Refills: 0 | Status: SHIPPED | OUTPATIENT
Start: 2022-03-15 | End: 2022-04-12 | Stop reason: SDUPTHER

## 2022-03-15 NOTE — TELEPHONE ENCOUNTER
Last OV 11/1/21  Filled on 2/9/22  #120/30  Pdmp Checked - Patient Receiving Potentially Dangerous Drug Combination

## 2022-04-12 RX ORDER — TRAMADOL HYDROCHLORIDE 50 MG/1
50 TABLET ORAL EVERY 6 HOURS PRN
Qty: 120 TABLET | Refills: 0 | Status: SHIPPED | OUTPATIENT
Start: 2022-04-14 | End: 2022-05-09 | Stop reason: SDUPTHER

## 2022-04-12 RX ORDER — LORAZEPAM 0.5 MG/1
0.5 TABLET ORAL DAILY PRN
Qty: 30 TABLET | Refills: 0 | Status: SHIPPED | OUTPATIENT
Start: 2022-04-12 | End: 2022-05-09 | Stop reason: SDUPTHER

## 2022-04-12 NOTE — TELEPHONE ENCOUNTER
Last OV 11/1/21  Both Filled on 3/15/22  Tramadol #120/30  Ativan #30  Pdmp Checked -  Patient Receiving Potentially Dangerous Drug Combination   Refill date 4/14/22

## 2022-04-13 ENCOUNTER — TELEPHONE (OUTPATIENT)
Dept: PRIMARY CARE | Facility: CLINIC | Age: 47
End: 2022-04-13
Payer: COMMERCIAL

## 2022-04-13 NOTE — TELEPHONE ENCOUNTER
patient is requesting a call back she can not come in for the 05/25 apt for med check offered next available july 25, she states dr siddiqui will want to see her before then.

## 2022-04-27 ENCOUNTER — APPOINTMENT (OUTPATIENT)
Dept: LAB | Age: 47
End: 2022-04-27
Attending: INTERNAL MEDICINE
Payer: COMMERCIAL

## 2022-04-27 ENCOUNTER — TRANSCRIBE ORDERS (OUTPATIENT)
Dept: LAB | Age: 47
End: 2022-04-27

## 2022-04-27 ENCOUNTER — APPOINTMENT (OUTPATIENT)
Dept: LAB | Age: 47
End: 2022-04-27
Attending: FAMILY MEDICINE
Payer: COMMERCIAL

## 2022-04-27 DIAGNOSIS — Z00.8 ENCOUNTER FOR OTHER GENERAL EXAMINATION: Primary | ICD-10-CM

## 2022-04-27 DIAGNOSIS — Z00.8 ENCOUNTER FOR OTHER GENERAL EXAMINATION: ICD-10-CM

## 2022-04-27 DIAGNOSIS — Z00.00 PE (PHYSICAL EXAM), ANNUAL: ICD-10-CM

## 2022-04-27 DIAGNOSIS — K90.0 CELIAC DISEASE: ICD-10-CM

## 2022-04-27 DIAGNOSIS — G71.02 FSHD (FACIOSCAPULOHUMERAL MUSCULAR DYSTROPHY) (CMS/HCC): ICD-10-CM

## 2022-04-27 DIAGNOSIS — K50.919 CROHN'S DISEASE WITH COMPLICATION, UNSPECIFIED GASTROINTESTINAL TRACT LOCATION (CMS/HCC): ICD-10-CM

## 2022-04-27 PROCEDURE — 80061 LIPID PANEL: CPT

## 2022-04-27 PROCEDURE — 80053 COMPREHEN METABOLIC PANEL: CPT

## 2022-04-27 PROCEDURE — 85025 COMPLETE CBC W/AUTO DIFF WBC: CPT

## 2022-04-27 PROCEDURE — 82306 VITAMIN D 25 HYDROXY: CPT

## 2022-04-27 PROCEDURE — 86735 MUMPS ANTIBODY: CPT

## 2022-04-27 PROCEDURE — 36415 COLL VENOUS BLD VENIPUNCTURE: CPT

## 2022-04-27 PROCEDURE — 84443 ASSAY THYROID STIM HORMONE: CPT

## 2022-04-27 PROCEDURE — 86706 HEP B SURFACE ANTIBODY: CPT

## 2022-04-27 PROCEDURE — 86762 RUBELLA ANTIBODY: CPT

## 2022-04-27 PROCEDURE — 86765 RUBEOLA ANTIBODY: CPT

## 2022-04-27 PROCEDURE — 86787 VARICELLA-ZOSTER ANTIBODY: CPT

## 2022-04-28 LAB
25(OH)D3 SERPL-MCNC: 77 NG/ML (ref 30–100)
ALBUMIN SERPL-MCNC: 4.4 G/DL (ref 3.4–5)
ALP SERPL-CCNC: 42 IU/L (ref 35–126)
ALT SERPL-CCNC: 37 IU/L (ref 11–54)
ANION GAP SERPL CALC-SCNC: 10 MEQ/L (ref 3–15)
AST SERPL-CCNC: 39 IU/L (ref 15–41)
BASOPHILS # BLD: 0.02 K/UL (ref 0.01–0.1)
BASOPHILS NFR BLD: 0.5 %
BILIRUB SERPL-MCNC: 0.7 MG/DL (ref 0.3–1.2)
BUN SERPL-MCNC: 9 MG/DL (ref 8–20)
BURR CELLS BLD QL SMEAR: ABNORMAL
CALCIUM SERPL-MCNC: 9.6 MG/DL (ref 8.9–10.3)
CHLORIDE SERPL-SCNC: 100 MEQ/L (ref 98–109)
CHOLEST SERPL-MCNC: 198 MG/DL
CO2 SERPL-SCNC: 30 MEQ/L (ref 22–32)
CREAT SERPL-MCNC: 0.3 MG/DL (ref 0.6–1.1)
DIFFERENTIAL METHOD BLD: ABNORMAL
EOSINOPHIL # BLD: 0.06 K/UL (ref 0.04–0.36)
EOSINOPHIL NFR BLD: 1.5 %
ERYTHROCYTE [DISTWIDTH] IN BLOOD BY AUTOMATED COUNT: 13.7 % (ref 11.7–14.4)
GFR SERPL CREATININE-BSD FRML MDRD: >60 ML/MIN/1.73M*2
GLUCOSE SERPL-MCNC: 70 MG/DL (ref 70–99)
HBV SURFACE AB SER QL: NONREACTIVE
HCT VFR BLDCO AUTO: 41.5 % (ref 35–45)
HDLC SERPL-MCNC: 87 MG/DL
HDLC SERPL: 2.3 {RATIO}
HGB BLD-MCNC: 13 G/DL (ref 11.8–15.7)
IMM GRANULOCYTES # BLD AUTO: 0.02 K/UL (ref 0–0.08)
IMM GRANULOCYTES NFR BLD AUTO: 0.5 %
LDLC SERPL CALC-MCNC: 100 MG/DL
LYMPHOCYTES # BLD: 1.02 K/UL (ref 1.2–3.5)
LYMPHOCYTES NFR BLD: 26 %
MCH RBC QN AUTO: 30.2 PG (ref 28–33.2)
MCHC RBC AUTO-ENTMCNC: 31.3 G/DL (ref 32.2–35.5)
MCV RBC AUTO: 96.5 FL (ref 83–98)
MONOCYTES # BLD: 0.33 K/UL (ref 0.28–0.8)
MONOCYTES NFR BLD: 8.4 %
NEUTROPHILS # BLD: 2.47 K/UL (ref 1.7–7)
NEUTS SEG NFR BLD: 63.1 %
NONHDLC SERPL-MCNC: 111 MG/DL
NRBC BLD-RTO: 0 %
PDW BLD AUTO: 10.7 FL (ref 9.4–12.3)
PLAT MORPH BLD: NORMAL
PLATELET # BLD AUTO: 135 K/UL (ref 150–369)
PLATELET # BLD EST: ABNORMAL 10*3/UL
POTASSIUM SERPL-SCNC: 3.5 MEQ/L (ref 3.6–5.1)
PROT SERPL-MCNC: 6.8 G/DL (ref 6–8.2)
RBC # BLD AUTO: 4.3 M/UL (ref 3.93–5.22)
RUBV IGG SER-ACNC: 59.1 IU/ML
SODIUM SERPL-SCNC: 140 MEQ/L (ref 136–144)
TRIGL SERPL-MCNC: 54 MG/DL (ref 30–149)
TSH SERPL DL<=0.05 MIU/L-ACNC: 1.68 MIU/L (ref 0.34–5.6)
WBC # BLD AUTO: 3.92 K/UL (ref 3.8–10.5)

## 2022-04-29 LAB
MEV IGG SER-ACNC: 84 AU/ML
MUV AB SER-ACNC: 291 AU/ML
VZV IGG SER-ACNC: 1039 AU/ML

## 2022-05-10 RX ORDER — TRAMADOL HYDROCHLORIDE 50 MG/1
50 TABLET ORAL EVERY 6 HOURS PRN
Qty: 120 TABLET | Refills: 0 | Status: SHIPPED | OUTPATIENT
Start: 2022-05-10 | End: 2022-06-13 | Stop reason: SDUPTHER

## 2022-05-10 RX ORDER — LORAZEPAM 0.5 MG/1
0.5 TABLET ORAL DAILY PRN
Qty: 30 TABLET | Refills: 0 | Status: SHIPPED | OUTPATIENT
Start: 2022-05-10 | End: 2022-06-13 | Stop reason: SDUPTHER

## 2022-05-23 ENCOUNTER — TELEPHONE (OUTPATIENT)
Dept: PRIMARY CARE | Facility: CLINIC | Age: 47
End: 2022-05-23
Payer: COMMERCIAL

## 2022-05-23 ENCOUNTER — TELEPHONE (OUTPATIENT)
Dept: PRIMARY CARE | Facility: CLINIC | Age: 47
End: 2022-05-23

## 2022-05-23 NOTE — TELEPHONE ENCOUNTER
Patient called to let DR Peña know she tested positive for Covid. She was asking for the pill or medication to help. Her sister and  were also exposed to Covid and they have MD. She would like to speak to someone to go over next steps.

## 2022-05-23 NOTE — TELEPHONE ENCOUNTER
Patient called in to inform Jessica of the pharmacy she would like covid medication sent to    [facility]   [address] [place] [id number] [phone]    Please inform patient once medication is sent

## 2022-05-23 NOTE — TELEPHONE ENCOUNTER
Pt states she tested positive this morning and her symptoms started Friday evening. She has a sore throat, fatigue, congestion, chills and a low grade fever    Pt is asking for the antiviral medication to be sent to [facility] in [place]

## 2022-05-26 ENCOUNTER — TELEMEDICINE (OUTPATIENT)
Dept: PRIMARY CARE | Facility: CLINIC | Age: 47
End: 2022-05-26
Payer: COMMERCIAL

## 2022-05-26 DIAGNOSIS — U07.1 COVID: ICD-10-CM

## 2022-05-26 DIAGNOSIS — D69.6 THROMBOCYTOPENIA (CMS/HCC): ICD-10-CM

## 2022-05-26 DIAGNOSIS — G71.02 FSHD (FACIOSCAPULOHUMERAL MUSCULAR DYSTROPHY) (CMS/HCC): Primary | ICD-10-CM

## 2022-05-26 DIAGNOSIS — G82.52 QUADRIPLEGIA, C1-C4 INCOMPLETE (CMS/HCC): ICD-10-CM

## 2022-05-26 DIAGNOSIS — Z79.899 MEDICATION MANAGEMENT: ICD-10-CM

## 2022-05-26 DIAGNOSIS — K50.919 CROHN'S DISEASE WITH COMPLICATION, UNSPECIFIED GASTROINTESTINAL TRACT LOCATION (CMS/HCC): ICD-10-CM

## 2022-05-26 PROCEDURE — 99214 OFFICE O/P EST MOD 30 MIN: CPT | Mod: 95 | Performed by: INTERNAL MEDICINE

## 2022-06-01 ENCOUNTER — TELEMEDICINE (OUTPATIENT)
Dept: PSYCHIATRY | Facility: HOSPITAL | Age: 47
End: 2022-06-01
Attending: PSYCHIATRY & NEUROLOGY
Payer: COMMERCIAL

## 2022-06-01 DIAGNOSIS — F41.1 GAD (GENERALIZED ANXIETY DISORDER): ICD-10-CM

## 2022-06-01 DIAGNOSIS — F33.1 MAJOR DEPRESSIVE DISORDER, RECURRENT EPISODE, MODERATE (CMS/HCC): Primary | ICD-10-CM

## 2022-06-01 PROCEDURE — 99214 OFFICE O/P EST MOD 30 MIN: CPT | Mod: 95 | Performed by: PSYCHIATRY & NEUROLOGY

## 2022-06-01 RX ORDER — CITALOPRAM 10 MG/1
10 TABLET ORAL DAILY
Qty: 90 TABLET | Refills: 0 | Status: SHIPPED | OUTPATIENT
Start: 2022-06-01 | End: 2022-09-01 | Stop reason: SDUPTHER

## 2022-06-01 NOTE — PROGRESS NOTES
"Sofia Valente is a 47 y.o. female who presents for Follow-up and Med Management.       Telemedicine Service  This visit occurred via telemedicine services with the consent of the patient.  [place] location[place] Home in [place]  [place] location: [place]  Those who participated in the encounter: Patient, Provider  Able to reach patient at : [phone]     Identified patient by name and birthdate, introduced myself and location, confirmed patient's location and private setting.  The details regarding Zoom platform including letting patient know the video conference platform is private confidential secure and real-time.  The conversation is not being recorded.  No other participants in the video conference beyond noted above.  Informed that a summary of our appointment would be entered into the medical record and [facility] health with bill for the session through telemedicine billing codes.  Patient informed of right to choose form of delivery service, including right to refuse video session.  Patient consents to behavioral health treatment    \"Fine\"    Patient reports overall managing well since last appointment.  Has had some thoughts of decreasing medication due to sexual side effects.  Primarily low interest.    Lorazepam most nights instead of ambien  Good control of anxiety with ativan    Also with comorbid relatively recent onset in menstrual cycle irregularity.  No hot flashes.  Has had discussions with OB/GYN regarding perimenopause.  Likely a contributing factor to sexual side effects.    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     Risk/Benefit/side Effects discussed regarding the following medications:  See above  PDMP Queried: Yes    Allergies:   Allergies   Allergen Reactions   • Gluten    • Gluten Protein GI intolerance   • Morphine    • Penicillins GI intolerance       Current Outpatient Medications:   •  citalopram (CeleXA) 10 mg tablet, Take 1 tablet (10 mg total) by mouth daily., , Disp: 90 tablet, Rfl: 0  •  cholecalciferol, vitamin D3, 5,000 unit (125 mcg) capsule, Take 5,000 Units by mouth daily., , Disp: , Rfl:   •  lidocaine (LIDODERM) 5 % patch, lidocaine 5 % topical patch  APPLY 1 PATCH BY TOPICAL ROUTE ONCE DAILY (MAY WEAR UP TO 12HOURS.) TO RIGHT SHOULDER FOR NEUROPATHIC PAIN, , Disp: , Rfl:   •  LORazepam (ATIVAN) 0.5 mg tablet, Take 1 tablet (0.5 mg total) by mouth daily as needed for anxiety for up to 15 doses., , Disp: 30 tablet, Rfl: 0  •  multivit with minerals/lutein (MULTIVITAMIN 50 PLUS ORAL), multivitamin, , Disp: , Rfl:   •  oxybutynin 5 mg tablet, TAKE 1 TABLET(5 MG) BY MOUTH THREE TIMES DAILY, , Disp: 270 tablet, Rfl: 1  •  traMADoL (ULTRAM) 50 mg tablet, Take 1 tablet (50 mg total) by mouth every 6 (six) hours as needed for moderate pain., , Disp: 120 tablet, Rfl: 0         Objective     Physical Exam      There were no vitals taken for this visit.    MENTAL STATUS EXAM  Appearance: well groomed, good hygiene  Gait and Motor: no abnormal movements, no tremor, no PMR/PMA  Speech: normal rate/rhythm/volume  Mood: pretty good  Affect: full range, congruent  Associations: intact  Thought Process: linear, logical, goal-directed  Thought Content: no auditory or visual hallucinations, no delusionary content  Suicidality/Homicidality: denies  Judgement/Insight: good  Orientation: Intact to interview  Memory: Intact  to interview  Attention: alert  Knowledge: normal  Language: normal          [place] Suicide Severity Rating Scale:  Not indicated      Safe-T Assessment:  Not indicated        Patient is a 47-year-old  G0, P0 female with history of FSHD (facioscapulohumeral muscular dystrophy), celiac disease,Crohn's disease, arthritis, chronic pain status post bone stimulator removal with mood and anxiety symptoms consistent with MDD recurrent moderate and PARISA. No dangerousness to self or others.  Strengths include intelligence, good family support.     Patient seen for PEV 9/9/2020, trial of Prozac with significant activation and inability to tolerate after only several doses, prozac discontinued.  Trial of citalopram 5mg initiated mid September 2020, increased citalopram to 10 mg  March 2021. Patient has tolerated and benefited from same.  Zolpidem prescribed by PCP.  April, sleep broken, Zolpidem discontinued and lorazepam adjusted to qhs to target sleep. Consider gabapentin augmentation in the future.    Interval history with generally well controlled mood and anxiety symptoms.  Continues to set goals and make progress towards achieving them.  Suspect SSRI contributing to sexual side effects, hormonal fluctuations/irregular menses comorbid contributor.  No dangerousness to self or others.  Patient agreeable to continuing 10 mg dose due to ongoing stressors need for lorazepam regularly to manage sleep wake cycle.       Plan:  Continue citalopram 10 mg p.o. daily, trial initiated September 2020, last increased March 2021, history of sensitivity to activation with trial of Prozac  Continue lorazepam 0.5 mg p.o. daily as needed, adjusted to nightly as needed, zolpidem discontinued, patient has lorazapam as prescribed by PCP no misuse, PDMP queried  Continue vitamin D replacement, follow-up with PCP as scheduled  Continue follow-up with therapist Dorene England LCSW, plans to increase frequency due to increased  anxiety/stressors over the last several months  Follow-up med check 12-16 weeks        Based on [place] Suicide Screen and current clinical assessment, patient is determined Low Risk.  Suicide Risk/Suicidal Ideation will not be added to patient's treatment plan.     Patient to F/U with treatment goals as outlined in treatment plan.  Patient to F/U with local ED or call 911 should SI/HI arise.    Visit Diagnosis:    ICD-10-CM ICD-9-CM   1. Major depressive disorder, recurrent episode, moderate (CMS/HCC)  F33.1 296.32   2. PARISA (generalized anxiety disorder)  F41.1 300.02     9:40 - 10:02    Total time 22min

## 2022-06-03 PROBLEM — U07.1 COVID: Status: ACTIVE | Noted: 2022-06-03

## 2022-06-03 PROBLEM — D69.6 THROMBOCYTOPENIA (CMS/HCC): Status: ACTIVE | Noted: 2022-06-03

## 2022-06-03 PROBLEM — Z79.899 MEDICATION MANAGEMENT: Status: ACTIVE | Noted: 2022-06-03

## 2022-06-03 ASSESSMENT — ENCOUNTER SYMPTOMS
ACTIVITY CHANGE: 0
CHEST TIGHTNESS: 0
PALPITATIONS: 0
ENDOCRINE NEGATIVE: 1
FEVER: 0
COUGH: 0
FATIGUE: 0
DIZZINESS: 0
EYE PAIN: 0
SHORTNESS OF BREATH: 0
APPETITE CHANGE: 0
WEAKNESS: 1
ARTHRALGIAS: 1
HEADACHES: 0

## 2022-06-03 NOTE — PROGRESS NOTES
Verification of Patient Location:  The patient affirms they are currently located in the following state: [place]    Request for Consent:    Audio and Video Encounter   Hello, my name is Jaycob VILLANUEVA DO Mariana.  Before we proceed, can you please verify your identification by telling me your full name and date of birth?  Can you tell me who is in the room with you?    You and I are about to have a telemedicine check-in or visit because you have requested it.  This is a live video-conference.  I am a real person, speaking to you in real time.  There is no one else with me on the video-conference.  However, when we use ([employer], [facility], etc) it is important for you to know that the video-conference may not be secure or private.  I am not recording this conversation and I am asking you not to record it.  This telemedicine visit will be billed to your health insurance or you, if you are self-insured.  You understand you will be responsible for any copayments or coinsurances that apply to your telemedicine visit.  Communication platform used for this encounter:  [employer] Video Visit (with Zoom integration)     Before starting our telemedicine visit, I am required to get your consent for this virtual check-in or visit by telemedicine. Do you consent?      Patient Response to Request for Consent:  Yes      Visit Documentation:  Subjective     Patient ID: Sofia Valente is a 47 y.o. female.  1975      Tested Positive for covid-  On day 3 of paxlovid- feels it helped limit symptoms- has seen noted improveemnt    Chronic-  Review of labs stable - slight drop in platelets- willplan to recheck for throughness    FSHD-  Following with specialist team/spasticity, weakness, pain-  L elbow-persists      The following have been reviewed and updated as appropriate in this visit:   Allergies         Review of Systems   Constitutional: Negative for activity change, appetite change, fatigue and fever.   HENT: Negative.     Eyes: Negative for pain and visual disturbance.   Respiratory: Negative for cough, chest tightness and shortness of breath.    Cardiovascular: Negative for chest pain and palpitations.   Endocrine: Negative.    Musculoskeletal: Positive for arthralgias and gait problem.   Neurological: Positive for weakness. Negative for dizziness and headaches.         Assessment/Plan   Diagnoses and all orders for this visit:    FSHD (facioscapulohumeral muscular dystrophy) (CMS/[facility]) (Primary)  Assessment & Plan:  Specialist evals-  Chronic pain mgmt  [facility] No red flags.         Medication management    Thrombocytopenia (CMS/[facility])  -     CBC and differential; Future    Crohn's disease with complication, unspecified gastrointestinal tract location (CMS/[facility])  Assessment & Plan:  Chronic stable. No change to regimen        Quadriplegia, C1-C4 incomplete (CMS/[facility])  Assessment & Plan:  2/2 FSHD      COVID  Assessment & Plan:  Cont on paxlovid- but once daily for next 2 days then stop  Let me know if any rebound affect        Time Spent:  I spent 30 minutes on this date of service performing the following activities: obtaining history, entering orders, documenting and providing counseling and education.

## 2022-06-10 NOTE — ASSESSMENT & PLAN NOTE
Diet controlled. Stable.   Received patient on AC/PC 20/24/8 65% tolerating therapy  Restraints have been removed  P&V therapy performed, tolerated well

## 2022-06-13 RX ORDER — TRAMADOL HYDROCHLORIDE 50 MG/1
50 TABLET ORAL EVERY 6 HOURS PRN
Qty: 120 TABLET | Refills: 0 | Status: SHIPPED | OUTPATIENT
Start: 2022-06-13 | End: 2022-07-12 | Stop reason: SDUPTHER

## 2022-06-13 RX ORDER — LORAZEPAM 0.5 MG/1
0.5 TABLET ORAL DAILY PRN
Qty: 30 TABLET | Refills: 0 | Status: SHIPPED | OUTPATIENT
Start: 2022-06-13 | End: 2022-07-12 | Stop reason: SDUPTHER

## 2022-06-13 NOTE — TELEPHONE ENCOUNTER
Last OV telemed 5/26/22  Tramadol Filled on 5/11/22 #120/30  Ativan filled on 5/11/22 #30  Pdmp Checked -  Patient Receiving Potentially Dangerous Drug Combination

## 2022-07-13 ENCOUNTER — APPOINTMENT (OUTPATIENT)
Dept: LAB | Age: 47
End: 2022-07-13
Attending: FAMILY MEDICINE
Payer: COMMERCIAL

## 2022-07-13 ENCOUNTER — TRANSCRIBE ORDERS (OUTPATIENT)
Dept: LAB | Age: 47
End: 2022-07-13

## 2022-07-13 DIAGNOSIS — Z00.8 ENCOUNTER FOR OTHER GENERAL EXAMINATION: Primary | ICD-10-CM

## 2022-07-13 DIAGNOSIS — Z00.8 ENCOUNTER FOR OTHER GENERAL EXAMINATION: ICD-10-CM

## 2022-07-13 PROCEDURE — 36415 COLL VENOUS BLD VENIPUNCTURE: CPT

## 2022-07-13 PROCEDURE — 86480 TB TEST CELL IMMUN MEASURE: CPT

## 2022-07-15 LAB
M TB IFN-G BLD-IMP: NEGATIVE
MITOGEN-NIL: >10 IU/ML
NIL: 0.03 IU/ML
TB AG-NIL: 0 IU/ML
TB2 AG - NIL: 0 IU/ML

## 2022-08-12 RX ORDER — LORAZEPAM 0.5 MG/1
0.5 TABLET ORAL DAILY PRN
Qty: 30 TABLET | Refills: 0 | Status: SHIPPED | OUTPATIENT
Start: 2022-08-12 | End: 2022-09-13 | Stop reason: SDUPTHER

## 2022-08-12 RX ORDER — TRAMADOL HYDROCHLORIDE 50 MG/1
50 TABLET ORAL EVERY 6 HOURS PRN
Qty: 120 TABLET | Refills: 0 | Status: SHIPPED | OUTPATIENT
Start: 2022-08-12 | End: 2022-10-05 | Stop reason: SDUPTHER

## 2022-08-12 NOTE — TELEPHONE ENCOUNTER
Last OV 11/1/2021  Both Filled on 7/12/22  Tramadol #120/30  Ativan #30  Pdmp Checked - Patient Receiving Potentially Dangerous Drug Combination

## 2022-09-01 RX ORDER — CITALOPRAM 10 MG/1
10 TABLET ORAL DAILY
Qty: 90 TABLET | Refills: 0 | Status: SHIPPED | OUTPATIENT
Start: 2022-09-01 | End: 2022-10-03 | Stop reason: SDUPTHER

## 2022-09-01 NOTE — TELEPHONE ENCOUNTER
Medicine Refill Request      Last Telemedicine Visit: 6/1/2022 Devora Todd MD    Next Appointment: 10/3/2022 Devora Todd MD      Current Outpatient Medications:     LORazepam (ATIVAN) 0.5 mg tablet, Take 1 tablet (0.5 mg total) by mouth daily as needed for anxiety for up to 15 doses., Disp: 30 tablet, Rfl: 0    traMADoL (ULTRAM) 50 mg tablet, Take 1 tablet (50 mg total) by mouth every 6 (six) hours as needed for moderate pain., Disp: 120 tablet, Rfl: 0    cholecalciferol, vitamin D3, 5,000 unit (125 mcg) capsule, Take 5,000 Units by mouth daily., Disp: , Rfl:     citalopram (CeleXA) 10 mg tablet, Take 1 tablet (10 mg total) by mouth daily., Disp: 90 tablet, Rfl: 0    lidocaine (LIDODERM) 5 % patch, lidocaine 5 % topical patch  APPLY 1 PATCH BY TOPICAL ROUTE ONCE DAILY (MAY WEAR UP TO 12HOURS.) TO RIGHT SHOULDER FOR NEUROPATHIC PAIN, Disp: , Rfl:     multivit with minerals/lutein (MULTIVITAMIN 50 PLUS ORAL), multivitamin, Disp: , Rfl:     oxybutynin 5 mg tablet, TAKE 1 TABLET(5 MG) BY MOUTH THREE TIMES DAILY, Disp: 270 tablet, Rfl: 1      BP Readings from Last 3 Encounters:   05/24/21 110/73   12/10/20 118/79   11/17/20 120/72       Recent Lab results:  Lab Results   Component Value Date    CHOL 198 04/27/2022   ,   Lab Results   Component Value Date    HDL 87 04/27/2022   ,   Lab Results   Component Value Date    LDLCALC 100 04/27/2022   ,   Lab Results   Component Value Date    TRIG 54 04/27/2022        Lab Results   Component Value Date    GLUCOSE 70 04/27/2022   , No results found for: HGBA1C      Lab Results   Component Value Date    CREATININE 0.3 (L) 04/27/2022       Lab Results   Component Value Date    TSH 1.68 04/27/2022

## 2022-09-02 ENCOUNTER — TELEPHONE (OUTPATIENT)
Dept: PRIMARY CARE | Facility: CLINIC | Age: 47
End: 2022-09-02
Payer: COMMERCIAL

## 2022-09-02 NOTE — TELEPHONE ENCOUNTER
[facility] that she sent via portal msg on 8/31/22. Asked to please fax back to [facility] when complete and call pt back to confirm. Thanks!

## 2022-10-03 ENCOUNTER — TELEMEDICINE (OUTPATIENT)
Dept: PSYCHIATRY | Facility: HOSPITAL | Age: 47
End: 2022-10-03
Attending: PSYCHIATRY & NEUROLOGY
Payer: COMMERCIAL

## 2022-10-03 DIAGNOSIS — F41.1 GAD (GENERALIZED ANXIETY DISORDER): ICD-10-CM

## 2022-10-03 DIAGNOSIS — F33.1 MAJOR DEPRESSIVE DISORDER, RECURRENT EPISODE, MODERATE (CMS/HCC): Primary | ICD-10-CM

## 2022-10-03 PROCEDURE — 99213 OFFICE O/P EST LOW 20 MIN: CPT | Mod: 95 | Performed by: PSYCHIATRY & NEUROLOGY

## 2022-10-03 RX ORDER — CITALOPRAM 10 MG/1
10 TABLET ORAL DAILY
Qty: 90 TABLET | Refills: 0 | Status: SHIPPED | OUTPATIENT
Start: 2022-10-03 | End: 2023-02-13 | Stop reason: SDUPTHER

## 2022-10-03 NOTE — PROGRESS NOTES
Sofia Valente is a 47 y.o. female who presents for Follow-up and Med Management.    Telemedicine Service  This visit occurred via telemedicine services with the consent of the patient.  [place] location[place] in [place]  [place] location: [place]  Those who participated in the encounter: Patient, Provider  Able to reach patient at : [phone]     Identified patient by name and birthdate, introduced myself and location, confirmed patient's location and private setting.  The details regarding Zoom platform including letting patient know the video conference platform is private confidential secure and real-time.  The conversation is not being recorded.  No other participants in the video conference beyond noted above.  Informed that a summary of our appointment would be entered into the medical record and [employer] with bill for the session through telemedicine billing codes.  Patient informed of right to choose form of delivery service, including right to refuse video session.  Patient consents to behavioral health treatment    Patient presents for follow-up of most recent  3 visit June 1, 2022.    Patient reports overall functioning at a high level across the last several months.  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    In addition, patient working to maintain caregivers needed for her parents.  Mother is in a wheelchair,   Parents with a good care giver, mother in a wheelchair. Sister able to visit parents regularly, one of patient's caregiver has recently resigned.     helpful with transport to parents and upcoming evaluation  Driving evaluation planned, a company in [place] started disabled individuals with driving asaptations  Vehicle with adaptive equipment expensive  May have a used option        Continues to notice exacerbation of  symptoms around menses    One less tramadol because pain has been less severe    Staying connected with Dorene Cadet LCSW    No need for Ambien for 18 months    Lorazapam one before bed    Orthopedist for elbow pain    Appetite - good  Libido - fair        Psychiatric ROS:   Sleep:Good and Fair  Appetite: Good  Libido: Fair        Medical Review of Systems  Review of Systems    PMSH: Updates were made to non psychiatric medications.   Current Outpatient Medications:     citalopram (CeleXA) 10 mg tablet, Take 1 tablet (10 mg total) by mouth daily., Disp: 90 tablet, Rfl: 0    cholecalciferol, vitamin D3, 5,000 unit (125 mcg) capsule, Take 5,000 Units by mouth daily., Disp: , Rfl:     LORazepam (ATIVAN) 0.5 mg tablet, Take 1 tablet (0.5 mg total) by mouth daily as needed for anxiety for up to 15 doses., Disp: 30 tablet, Rfl: 0    multivit with minerals/lutein (MULTIVITAMIN 50 PLUS ORAL), multivitamin, Disp: , Rfl:     oxybutynin 5 mg tablet, TAKE 1 TABLET(5 MG) BY MOUTH THREE TIMES DAILY, Disp: 270 tablet, Rfl: 1    traMADoL (ULTRAM) 50 mg tablet, Take 1 tablet (50 mg total) by mouth every 6 (six) hours as needed for moderate pain., Disp: 120 tablet, Rfl: 0     Risk/Benefit/side Effects discussed regarding the following medications: See below  PDMP Queried: Yes    Allergies:   Allergies   Allergen Reactions    Gluten     Gluten Protein GI intolerance    Morphine     Penicillins GI intolerance       Current Outpatient Medications:     citalopram (CeleXA) 10 mg tablet, Take 1 tablet (10 mg total) by mouth daily., , Disp: 90 tablet, Rfl: 0    cholecalciferol, vitamin D3, 5,000 unit (125 mcg) capsule, Take 5,000 Units by mouth daily., , Disp: , Rfl:     LORazepam (ATIVAN) 0.5 mg tablet, Take 1 tablet (0.5 mg total) by mouth daily as needed for anxiety for up to 15 doses., , Disp: 30 tablet, Rfl: 0    multivit with minerals/lutein (MULTIVITAMIN 50 PLUS ORAL), multivitamin, , Disp: , Rfl:     oxybutynin 5  mg tablet, TAKE 1 TABLET(5 MG) BY MOUTH THREE TIMES DAILY, , Disp: 270 tablet, Rfl: 1    traMADoL (ULTRAM) 50 mg tablet, Take 1 tablet (50 mg total) by mouth every 6 (six) hours as needed for moderate pain., , Disp: 120 tablet, Rfl: 0         Objective     Physical Exam    There were no vitals taken for this visit.    MENTAL STATUS EXAM  Appearance: well groomed and appropriate attire  Gait and Motor: Good eye contact  Speech: fluent  Mood: 'good'  Affect: normal  Associations: coherent  Thought Process: goal-directed  Thought Content: no auditory or visual hallucinations. and appropriate to situation  Suicidality/Homicidality: denies  Judgement/Insight: good  Orientation: day, month and year  Memory: recalls recent events and recalls remote events  Attention: alert  Knowledge: normal  Language: normal        [place] Suicide Severity Rating Scale:  Not indicated          Labs  No new labs.    Brief Psychiatric Formulation: Patient is a 47-year-old  G0, P0 female with history of FSHD (facioscapulohumeral muscular dystrophy), celiac disease,Crohn's disease, arthritis, chronic pain status post bone stimulator removal with mood and anxiety symptoms consistent with MDD recurrent moderate and PARISA. No history of dangerousness to self or others.  Strengths include intelligence, good family support.     Patient seen for PEV 9/9/2020, trial of Prozac with significant activation and inability to tolerate after only several doses, prozac discontinued.  Trial of citalopram 5mg initiated mid September 2020, increased citalopram to 10 mg  March 2021. Patient has tolerated and benefited from same.  Zolpidem prescribed by PCP.  April, sleep broken, Zolpidem discontinued and lorazepam adjusted to qhs to target sleep. Consider gabapentin augmentation in the future.    Interval history with moderate stressors.  Patient able to function at a high level across domains despite stressors.  Served as chairperson for successful  large-scale .  Working to replace caregivers both her mom's caregiver (several hours away) as well as 1 of patient's regular caregivers.  Also looking forward to driving evaluation and exploring options for more affordable adaptive equipment to return to driving.  Denies anxiety regarding driving.  Mood symptoms in good control.  No dangerousness to self or others.       Plan:  Continue citalopram 10 mg p.o. daily, trial initiated September 2020, last increased March 2021, history of sensitivity to activation with trial of Prozac  Continue lorazepam 0.5 mg p.o. daily as needed, adjusted to nightly as needed, zolpidem discontinued in the recent past, patient has lorazapam as prescribed by PCP no misuse, PDMP queried  Continue vitamin D replacement, follow-up with PCP as scheduled  Continue follow-up with therapist Dorene England LCSW as needed  Follow-up psychiatry visit 4 to 5 months  Based on [facility] Suicide Screen and current clinical assessment, patient is determined Low Risk.  Suicide Risk/Suicidal Ideation will not be added to patient's treatment plan.     Patient to F/U with treatment goals as outlined in treatment plan.  Patient to F/U with local ED or call 911 should SI/HI arise.    Visit Diagnosis:    ICD-10-CM ICD-9-CM   1. Major depressive disorder, recurrent episode, moderate (CMS/HCC)  F33.1 296.32   2. PARISA (generalized anxiety disorder)  F41.1 300.02       Duration:  20 minutes  Devora Todd MD @ 5:20 PM   2:30 - 2:50

## 2022-10-18 ENCOUNTER — TELEPHONE (OUTPATIENT)
Dept: PRIMARY CARE | Facility: CLINIC | Age: 47
End: 2022-10-18
Payer: COMMERCIAL

## 2022-10-18 NOTE — TELEPHONE ENCOUNTER
[facility] Appointment Request   Provider: Mariana   Appointment Type: dsx  Reason for Visit: covid booster, patient is requesting same date and time as upcoming EPP 11/2  Available Day and Time:   Best Contact Number: [phone]    The practice will reach out to schedule your appointment within the next 2 business days.

## 2022-11-02 ENCOUNTER — OFFICE VISIT (OUTPATIENT)
Dept: PRIMARY CARE | Facility: CLINIC | Age: 47
End: 2022-11-02
Payer: COMMERCIAL

## 2022-11-02 VITALS
DIASTOLIC BLOOD PRESSURE: 84 MMHG | HEART RATE: 109 BPM | SYSTOLIC BLOOD PRESSURE: 120 MMHG | RESPIRATION RATE: 18 BRPM | TEMPERATURE: 97.4 F | OXYGEN SATURATION: 96 %

## 2022-11-02 DIAGNOSIS — K90.0 CELIAC DISEASE: ICD-10-CM

## 2022-11-02 DIAGNOSIS — G82.52 QUADRIPLEGIA, C1-C4 INCOMPLETE (CMS/HCC): ICD-10-CM

## 2022-11-02 DIAGNOSIS — D69.6 THROMBOCYTOPENIA (CMS/HCC): ICD-10-CM

## 2022-11-02 DIAGNOSIS — G71.02 FSHD (FACIOSCAPULOHUMERAL MUSCULAR DYSTROPHY) (CMS/HCC): ICD-10-CM

## 2022-11-02 DIAGNOSIS — K50.919 CROHN'S DISEASE WITH COMPLICATION, UNSPECIFIED GASTROINTESTINAL TRACT LOCATION (CMS/HCC): ICD-10-CM

## 2022-11-02 DIAGNOSIS — R32 URINARY INCONTINENCE, UNSPECIFIED TYPE: ICD-10-CM

## 2022-11-02 DIAGNOSIS — E55.9 VITAMIN D DEFICIENCY, UNSPECIFIED: ICD-10-CM

## 2022-11-02 DIAGNOSIS — Z00.00 ENCOUNTER FOR GENERAL ADULT MEDICAL EXAMINATION WITHOUT ABNORMAL FINDINGS: Primary | ICD-10-CM

## 2022-11-02 PROCEDURE — 91313 MODERNA COVID-19 VACCINE BIVALENT BOOSTER 12 YEARS A: CPT | Performed by: INTERNAL MEDICINE

## 2022-11-02 PROCEDURE — 0134A MODERNA COVID-19 VACCINE BIVALENT BOOSTER 12 YEARS A: CPT | Performed by: INTERNAL MEDICINE

## 2022-11-02 PROCEDURE — 99396 PREV VISIT EST AGE 40-64: CPT | Mod: 25 | Performed by: INTERNAL MEDICINE

## 2022-11-02 RX ORDER — BETAMETHASONE VALERATE 1 MG/G
CREAM TOPICAL 2 TIMES DAILY
Qty: 45 G | Refills: 1 | Status: SHIPPED | OUTPATIENT
Start: 2022-11-02 | End: 2023-11-09 | Stop reason: SDUPTHER

## 2022-11-02 RX ORDER — LORAZEPAM 0.5 MG/1
0.5 TABLET ORAL DAILY PRN
Qty: 30 TABLET | Refills: 0 | Status: SHIPPED | OUTPATIENT
Start: 2022-11-02 | End: 2022-12-05 | Stop reason: SDUPTHER

## 2022-11-02 RX ORDER — MIRABEGRON 25 MG/1
25 TABLET, FILM COATED, EXTENDED RELEASE ORAL DAILY
Qty: 30 TABLET | Refills: 1 | Status: SHIPPED | OUTPATIENT
Start: 2022-11-02 | End: 2022-11-21 | Stop reason: SDUPTHER

## 2022-11-02 RX ORDER — TRAMADOL HYDROCHLORIDE AND ACETAMINOPHEN 37.5; 325 MG/1; MG/1
1 TABLET, FILM COATED ORAL EVERY 8 HOURS PRN
Qty: 30 TABLET | Refills: 0 | Status: SHIPPED | OUTPATIENT
Start: 2022-11-02 | End: 2023-02-13 | Stop reason: ALTCHOICE

## 2022-11-02 ASSESSMENT — ENCOUNTER SYMPTOMS
HEADACHES: 0
FATIGUE: 0
CHEST TIGHTNESS: 0
FEVER: 0
ENDOCRINE NEGATIVE: 1
ACTIVITY CHANGE: 0
DIZZINESS: 0
PALPITATIONS: 0
EYE PAIN: 0
COUGH: 0
WEAKNESS: 0
APPETITE CHANGE: 0
SHORTNESS OF BREATH: 0

## 2022-11-02 ASSESSMENT — PATIENT HEALTH QUESTIONNAIRE - PHQ9: SUM OF ALL RESPONSES TO PHQ9 QUESTIONS 1 & 2: 0

## 2022-11-02 NOTE — PROGRESS NOTES
Subjective     Patient ID: Sofia Valente is a 47 y.o. female.    Presents today for PE    Diet-stable   Exercise-limited with chronic FSHD  Sleep-doing ok  Mood/anx-stable- no new issues    Chronic -  Urinary incont-refer to urology    New-  Hypothermic episode -isolated            Review of Systems   Constitutional: Negative for activity change, appetite change, fatigue and fever.   HENT: Negative.    Eyes: Negative for pain and visual disturbance.   Respiratory: Negative for cough, chest tightness and shortness of breath.    Cardiovascular: Negative for chest pain and palpitations.   Endocrine: Negative.    Neurological: Negative for dizziness, weakness and headaches.       Current Outpatient Medications   Medication Sig Dispense Refill    betamethasone valerate (VALISONE) 0.1 % cream Apply topically 2 (two) times a day. 45 g 1    cholecalciferol, vitamin D3, 5,000 unit (125 mcg) capsule Take 5,000 Units by mouth daily.      citalopram (CeleXA) 10 mg tablet Take 1 tablet (10 mg total) by mouth daily. 90 tablet 0    LORazepam (ATIVAN) 0.5 mg tablet Take 1 tablet (0.5 mg total) by mouth daily as needed for anxiety for up to 15 doses. 30 tablet 0    mirabegron (MYRBETRIQ) 25 mg ER tablet Take 1 tablet (25 mg total) by mouth daily. 30 tablet 1    multivit with minerals/lutein (MULTIVITAMIN 50 PLUS ORAL) multivitamin      oxybutynin 5 mg tablet TAKE 1 TABLET(5 MG) BY MOUTH THREE TIMES DAILY 270 tablet 1    traMADoL (ULTRAM) 50 mg tablet Take 1 tablet (50 mg total) by mouth every 6 (six) hours as needed for moderate pain. 120 tablet 0    traMADoL-acetaminophen (ULTRACET) 37.5-325 mg per tablet Take 1 tablet by mouth every 8 (eight) hours as needed for moderate pain. 30 tablet 0     No current facility-administered medications for this visit.     Past Medical History:   Diagnosis Date    CD (Crohn's disease) (CMS/[facility])     Celiac disease     FSHD (facioscapulohumeral muscular dystrophy) (CMS/[facility])      Tibial fracture     right tibia transverse fracture    Urinary incontinence      Family History   Problem Relation Age of Onset    Celiac disease Biological Mother     Muscular dystrophy Biological Mother     Stroke Biological Mother     No Known Problems Biological Father     Muscular dystrophy Biological Sister     Colon cancer Paternal Grandmother     Lymphoma Paternal Grandfather     Muscular dystrophy Maternal Grandmother      Past Surgical History:   Procedure Laterality Date    APPENDECTOMY      BOWEL RESECTION      OTHER SURGICAL HISTORY Right     Scapulothoracic fusion      Social History     Socioeconomic History    Marital status:      Spouse name: Not on file    Number of children: Not on file    Years of education: Not on file    Highest education level: Not on file   Occupational History    Not on file   Tobacco Use    Smoking status: Never    Smokeless tobacco: Never   Substance and Sexual Activity    Alcohol use: Never    Drug use: Never    Sexual activity: Yes     Partners: Male   Other Topics Concern    Not on file   Social History Narrative    Not on file     Social Determinants of Health     Financial Resource Strain: Not on file   Food Insecurity: Not on file   Transportation Needs: Not on file   Physical Activity: Not on file   Stress: Not on file   Social Connections: Not on file   Intimate Partner Violence: Not on file   Housing Stability: Not on file     Allergies   Allergen Reactions    Gluten     Gluten Protein GI intolerance    Morphine     Penicillins GI intolerance       Objective     Vitals:    11/02/22 1027   BP: 120/84   Pulse: (!) 109   Resp: 18   Temp: 36.3 °C (97.4 °F)   SpO2: 96%     There is no height or weight on file to calculate BMI.    Physical Exam  Vitals and nursing note reviewed.   Constitutional:       Appearance: She is well-developed and well-nourished.   HENT:      Head: Normocephalic and atraumatic.   Eyes:      Extraocular  Movements: EOM normal.      Pupils: Pupils are equal, round, and reactive to light.   Cardiovascular:      Rate and Rhythm: Normal rate and regular rhythm.   Pulmonary:      Effort: Pulmonary effort is normal.      Breath sounds: Normal breath sounds.   Musculoskeletal:      Cervical back: Normal range of motion.   Skin:     General: Skin is warm and dry.   Neurological:      Mental Status: She is alert and oriented to person, place, and time.      Motor: Weakness and atrophy present.      Comments: Chronic weakness and atrophy associated with diagnosis FSHD,            Assessment/Plan   Problem List Items Addressed This Visit        Nervous    Quadriplegia, C1-C4 incomplete (CMS/HCC)       Digestive    Celiac disease    Relevant Orders    Vitamin D 25 hydroxy       Genitourinary    Urinary incontinence     oxybut once daily fro mbid add in mybetriq  May need to dose increase Myr-notify me in coming month if need for better sx control.    Urology consult- Toglia and bernadette referral         Relevant Orders    Ambulatory referral to Urology       Musculoskeletal    FSHD (facioscapulohumeral muscular dystrophy) (CMS/HCC)     Chronic pain- has noted slight less need for tramadol- so will trial on decreased dose of 37.5 mg down from 50 mg- 10 day trial.    Searched [place] PDMP database. No red flags.               Endocrine/Metabolic    Vitamin D deficiency, unspecified    Relevant Orders    Vitamin D 25 hydroxy       Hematologic    Thrombocytopenia (CMS/HCC)       Immune    CD (Crohn's disease) (CMS/[facility])     Will plan for scope in 2023- need to look into new provider - Dr Mauricio has since retired.         Relevant Orders    Vitamin D 25 hydroxy       Other    Encounter for general adult medical examination without abnormal findings - Primary     VAX-covid  LABS-ordered  Specialist-uro  Diet -well balanced in moderation  Exercise - Maintain regular activity  Rec- RTO annually for PE and PRN           Relevant Orders    CBC  and differential    Comprehensive metabolic panel    Lipid panel    TSH w reflex FT4     Orders Placed This Encounter   Procedures    COVID-19 vaccine, Moderna, BIVALENT Booster     Order Specific Question:   Does the patient currently have symptoms of an acute illness?     Answer:   No     Order Specific Question:   Has the patient acknowledged that they have received, read, and understand the information provided in the Fact Sheet; and have they been provided the opportunity to review the ingredients of the COVID-19 vaccine that they are receiving today?     Answer:   No     Order Specific Question:   Defer vaccination until the patient acknowledged that they received, read and understand the information provided in the Fact Sheet and have been provided the opportunity to review the ingredients of the COVID-19 vaccine that they are receiving today.     Answer:   acknowledged     Order Specific Question:   Does the patient have a known allergy to any component of the COVID-19 vaccine they are receiving, a history of an allergic reaction after a previous dose of any COVID-19 vaccine, or an allergy-related contraindication to a non-mRNA COVID-19 vaccine?     Answer:   No     Order Specific Question:   Has the patient had an allergic reaction of any severity to any vaccine other than a COVID-19 vaccine or to any injectable therapy?     Answer:   No     Order Specific Question:   Does the patient have a history of anaphylaxis due to any cause other than a COVID-19 vaccine or its ingredients?     Answer:   No     Order Specific Question:   Does the patient have a history of myocarditis, pericarditis, Multisystem Inflammatory Syndrome in Children (MIS-C) or Multisystem Inflammatory Syndrome Adults (MIS-A)?     Answer:   No     Order Specific Question:   Observation time:     Answer:   Observe for 15 minutes    CBC and differential     Standing Status:   Future     Standing Expiration Date:   11/2/2023     Order  Specific Question:   Release to patient     Answer:   Immediate    Comprehensive metabolic panel     Standing Status:   Future     Standing Expiration Date:   11/2/2023     Order Specific Question:   Release to patient     Answer:   Immediate    Lipid panel     Standing Status:   Future     Standing Expiration Date:   11/2/2023     Order Specific Question:   Release to patient     Answer:   Immediate    TSH w reflex FT4     Standing Status:   Future     Standing Expiration Date:   11/2/2023     Order Specific Question:   Release to patient     Answer:   Immediate    Vitamin D 25 hydroxy     Standing Status:   Future     Standing Expiration Date:   11/2/2023     Order Specific Question:   Release to patient     Answer:   Immediate    Ambulatory referral to Urology     Standing Status:   Future     Standing Expiration Date:   5/2/2023     Referral Priority:   Routine     Referral Type:   Consultation     Referral Reason:   Specialty Services Required     Referred to Provider:   Rd Torres MD     Number of Visits Requested:   10

## 2022-11-02 NOTE — ASSESSMENT & PLAN NOTE
VAX-covid  LABS-ordered  Specialist-uro  Diet -well balanced in moderation  Exercise - Maintain regular activity  Rec- RTO annually for PE and PRN

## 2022-11-02 NOTE — ASSESSMENT & PLAN NOTE
oxybut once daily fro mbid add in mybetriq  May need to dose increase Myr-notify me in coming month if need for better sx control.    Urology consult- Toglia and bernadette referral

## 2022-11-02 NOTE — ASSESSMENT & PLAN NOTE
Chronic pain- has noted slight less need for tramadol- so will trial on decreased dose of 37.5 mg down from 50 mg- 10 day trial.    [facility] [place] [facility]. No red flags.

## 2022-11-21 RX ORDER — MIRABEGRON 25 MG/1
50 TABLET, FILM COATED, EXTENDED RELEASE ORAL DAILY
Qty: 60 TABLET | Refills: 1 | Status: SHIPPED | OUTPATIENT
Start: 2022-11-21 | End: 2023-02-10

## 2022-12-28 NOTE — TELEPHONE ENCOUNTER
Last OV 11/2/22  Both Filled on 12/5/22  Tramadol #120/30  Lorazepam #30/30  Pdmp Checked with No Red Flags

## 2022-12-29 RX ORDER — TRAMADOL HYDROCHLORIDE 50 MG/1
50 TABLET ORAL EVERY 6 HOURS PRN
Qty: 120 TABLET | Refills: 0 | Status: SHIPPED | OUTPATIENT
Start: 2022-12-29 | End: 2023-03-06 | Stop reason: SDUPTHER

## 2022-12-29 RX ORDER — LORAZEPAM 0.5 MG/1
0.5 TABLET ORAL DAILY PRN
Qty: 30 TABLET | Refills: 0 | Status: SHIPPED | OUTPATIENT
Start: 2022-12-29 | End: 2023-02-01 | Stop reason: SDUPTHER

## 2023-02-06 RX ORDER — MIRABEGRON 25 MG/1
TABLET, FILM COATED, EXTENDED RELEASE ORAL
Qty: 60 TABLET | Refills: 1 | OUTPATIENT
Start: 2023-02-06

## 2023-02-06 RX ORDER — MIRABEGRON 50 MG/1
50 TABLET, FILM COATED, EXTENDED RELEASE ORAL DAILY
Qty: 90 TABLET | Refills: 1 | Status: SHIPPED | OUTPATIENT
Start: 2023-02-06 | End: 2023-02-10

## 2023-02-08 NOTE — TELEPHONE ENCOUNTER
Pt called the office to update that she has been taking the myrbetriq 25mg and it was not helping, she had remaining tablets of her oxybuytnin 5mg and restarted taking that and it has been helping. Pt is requesting a refill for 90 days

## 2023-02-10 RX ORDER — OXYBUTYNIN CHLORIDE 5 MG/1
TABLET ORAL
Qty: 270 TABLET | Refills: 3 | Status: SHIPPED | OUTPATIENT
Start: 2023-02-10 | End: 2023-06-30 | Stop reason: SDUPTHER

## 2023-02-13 ENCOUNTER — TELEMEDICINE (OUTPATIENT)
Dept: PSYCHIATRY | Facility: HOSPITAL | Age: 48
End: 2023-02-13
Attending: PSYCHIATRY & NEUROLOGY
Payer: COMMERCIAL

## 2023-02-13 DIAGNOSIS — F33.1 MAJOR DEPRESSIVE DISORDER, RECURRENT EPISODE, MODERATE (CMS/HCC): Primary | ICD-10-CM

## 2023-02-13 DIAGNOSIS — F41.1 GAD (GENERALIZED ANXIETY DISORDER): ICD-10-CM

## 2023-02-13 PROCEDURE — 99213 OFFICE O/P EST LOW 20 MIN: CPT | Mod: 95 | Performed by: PSYCHIATRY & NEUROLOGY

## 2023-02-13 RX ORDER — CITALOPRAM 10 MG/1
10 TABLET ORAL DAILY
Qty: 90 TABLET | Refills: 1 | Status: SHIPPED | OUTPATIENT
Start: 2023-02-13 | End: 2023-06-30 | Stop reason: SDUPTHER

## 2023-02-13 NOTE — PROGRESS NOTES
Sofia Valente is a 48 y.o. female who presents for Follow-up and Med Management.    Telemedicine Service  This visit occurred via telemedicine services with the consent of the patient.  Psychiatrist location: Home in [place]  Provider location: [place]  Those who participated in the encounter: Patient, Psychiatrist  Able to reach patient at : [phone]     Identified patient by name and birthdate, introduced myself and location, confirmed patient's location and private setting.  The details regarding Epic video visit [employer] platform including letting patient know the video conference platform is private confidential secure and real-time.  The conversation is not being recorded.  No other participants in the video conference beyond noted above.  Informed that a summary of our appointment would be entered into the medical record and [employer] [facility] with bill for the session through telemedicine billing codes.  Patient informed of right to choose form of delivery service, including right to refuse video session.  Patient consents to behavioral health treatment      Discussed with patient that outpatient psychiatric services will soon be offered in-person in addition to ongoing availability by Epic Video Visit My Chart. Patient advised that in person appointments may be required at the discretion of the clinician, including but not limited to need for vital signs, physical exam and or as required by regulations for controlled substance prescriptions. Pt is clinically appropriate for and prefers telemedicine platform at this time.    Mild up tick in anxiety with increased heart rate, transient    Contractions in wrist/ thumb, has tendon release procedure scheduled    Full time caregiver during the week for patient, alternate on weekends    [place]s' are [place]    Adaptive driving course completed, awaiting tendon release    Father with upcoming surgery        Insurance  - working to update    Missing a purpose, aware of  periods when she feels less connected and works to find options to connect with meaning/purpose    Working on puzzles to distract    Shopping with [facility] charter revision, stepping down as chair    IT, none for a few months, follow up as needed    Tolerating tramadol 2 per day, pain generally controlled, prefers not to dose more frequently    Sleep - generally good, wakes but able to get back to sleep  Energy - fair  Appetite -good  Libido - fair    Some worsening mood / anxiety around menses     with some periods of feeling low    Cruise in May  Upcoming kitchen remodel         Psychiatric ROS:   Sleep:Good  Appetite: Good  Libido: Fair  Exercise: Regular PT to maintain maximum mobility  ETOH/Substances:denies     Medical Review of Systems  Review of Systems    PMSH: No changes were made to non-psychiatric medications/allergies/medical history.     Risk/Benefit/side Effects discussed regarding the following medications:  See below  PDMP Queried: Yes    Allergies:   Allergies   Allergen Reactions   • Gluten    • Gluten Protein GI intolerance   • Morphine    • Penicillins GI intolerance       Current Outpatient Medications:   •  citalopram (CeleXA) 10 mg tablet, Take 1 tablet (10 mg total) by mouth daily., , Disp: 90 tablet, Rfl: 1  •  oxybutynin (DITROPAN) 5 mg tablet, TAKE 1 TABLET(5 MG) BY MOUTH THREE TIMES DAILY Strength: 5 mg, , Disp: 270 tablet, Rfl: 3  •  betamethasone valerate (VALISONE) 0.1 % cream, Apply topically 2 (two) times a day., , Disp: 45 g, Rfl: 1  •  cholecalciferol, vitamin D3, 5,000 unit (125 mcg) capsule, Take 5,000 Units by mouth daily., , Disp: , Rfl:   •  LORazepam (ATIVAN) 0.5 mg tablet, Take 1 tablet (0.5 mg total) by mouth daily as needed for anxiety for up to 15 doses., , Disp: 30 tablet, Rfl: 0  •  multivit with minerals/lutein (MULTIVITAMIN 50 PLUS ORAL), multivitamin, , Disp: , Rfl:   •  traMADoL (ULTRAM) 50 mg tablet, Take 1 tablet (50 mg total) by mouth every  6 (six) hours as needed for moderate pain., , Disp: 120 tablet, Rfl: 0         Objective     Physical Exam    There were no vitals taken for this visit.    MENTAL STATUS EXAM  Appearance: well groomed  Gait and Motor: Wheelchair, right forearm in brace  Speech: fluent  Mood: 'okay'  Affect: constricted  Associations: coherent  Thought Process: goal-directed  Thought Content: no auditory or visual hallucinations. and appropriate to situation  Suicidality/Homicidality: denies and no thoughts of harm toward child/children  Judgement/Insight: good  Orientation: day, month and year  Memory: recalls recent events and recalls remote events  Attention: withdrawn  Knowledge: normal  Language: normal        [place] Suicide Severity Rating Scale:  Not indicated          Labs  No new labs.      Brief Psychiatric Formulation: Patient is a 48-year-old  G0, P0 female with history of FSHD (facioscapulohumeral muscular dystrophy), celiac disease,Crohn's disease, arthritis, chronic pain status post bone stimulator removal with mood and anxiety symptoms consistent with MDD recurrent moderate and PARISA. No history of dangerousness to self or others.  Strengths include intelligence, good family support.     Patient seen for PEV 9/9/2020, trial of Prozac with significant activation and inability to tolerate after only several doses, prozac discontinued.  Trial of citalopram 5mg initiated mid September 2020, increased citalopram to 10 mg  March 2021. Patient has tolerated and benefited from same.  Zolpidem prescribed by PCP.  April, sleep broken, Zolpidem discontinued and lorazepam adjusted to qhs to target sleep. Consider gabapentin augmentation in the future.    Interval history with ability to stabilize caregiver needs for both herself and her mother, unfortunately, patient does require procedure for tendon release which will temporarily leave patient unable to use hand controlled assistive devices/wheelchair.  Patient optimistic  that with procedure, she will return to driving and baseline mobility.  Reports ongoing support of her  and family.  Patient with ongoing strong sense of self, verbalizes optimism especially when able to engage in meaningful/purpose driven activities.  Realistic about long-term goal of moving parents closer to her home given the complex care needs, current remote location 4 hours away from patient.           Plan:  -Continue citalopram 10 mg p.o. daily, trial initiated September 2020, last increased March 2021, history of sensitivity to activation with trial of Prozac, monitor sexual side effects  -Patient with new insurance, awaiting clarification as to mail order, for now we will send 90-day supply with refill to retail pharmacy, okay to send to mail order if retail only providing 30-day prescriptions  -Continue lorazepam 0.5 mg p.o. daily as needed, adjusted to nightly as needed, zolpidem discontinued in the recent past, patient has lorazapam as prescribed by PCP no misuse, PDMP queried  -Continue vitamin D replacement, follow-up with PCP as scheduled  -Continue follow-up with therapist Dorene England LCSW, seeing as needed, available as needed  -Follow-up psychiatry visit 4  Months    Based on [place] Suicide Screen and current clinical assessment, patient is determined Low Risk.  Suicide Risk/Suicidal Ideation will not be added to patient's treatment plan.     Patient to F/U with treatment goals as outlined in treatment plan.  Patient to F/U with local ED or call 911 should SI/HI arise.    Visit Diagnosis:    ICD-10-CM ICD-9-CM   1. Major depressive disorder, recurrent episode, moderate (CMS/HCC)  F33.1 296.32   2. PARISA (generalized anxiety disorder)  F41.1 300.02       Devora Todd MD @ 3:32 PM

## 2023-03-29 NOTE — TELEPHONE ENCOUNTER
Sofia called to request her current refills be transferred to a new online pharmacy.  RN will attempt to facilitate or contact patient to request the transfer from her pharmacy.

## 2023-04-12 NOTE — TELEPHONE ENCOUNTER
Medicine Refill Request    Last Office Visit: Visit date not found  Last Telemedicine Visit: 5/1/2020 Pepe Choudhury,     Next Office Visit: Visit date not found  Next Telemedicine Visit: Visit date not found         Current Outpatient Medications:   •  traMADoL (ULTRAM) 50 mg tablet, Take 50 mg by mouth 2 (two) times a day as needed., Disp: , Rfl:   •  citalopram (CeleXA) 10 mg tablet, Take 0.5 tablets (5 mg total) by mouth daily., Disp: 15 tablet, Rfl: 0  •  naproxen (NAPROSYN) 375 mg tablet, Take 1 tablet (375 mg total) by mouth 2 (two) times a day with meals., Disp: 180 tablet, Rfl: 1  •  oxybutynin (DITROPAN) 5 mg tablet, Take 1 tablet (5 mg total) by mouth 3 (three) times a day., Disp: 270 tablet, Rfl: 1  •  zolpidem (AMBIEN) 5 mg tablet, Take 1 tablet (5 mg total) by mouth nightly., Disp: 30 tablet, Rfl: 1      BP Readings from Last 3 Encounters:   07/10/20 120/82   05/02/19 135/72       Recent Lab results:  No results found for: CHOL, No results found for: HDL, No results found for: LDLCALC, No results found for: TRIG     Lab Results   Component Value Date    GLUCOSE 99 07/10/2020   , No results found for: HGBA1C      Lab Results   Component Value Date    CREATININE 0.25 (L) 07/10/2020       Lab Results   Component Value Date    TSH 1.17 09/24/2020            Qbrexza Pregnancy And Lactation Text: There is no available data on Qbrexza use in pregnant women.  There is no available data on Qbrexza use in lactation.

## 2023-05-02 RX ORDER — TRAMADOL HYDROCHLORIDE 50 MG/1
50 TABLET ORAL EVERY 6 HOURS PRN
Qty: 120 TABLET | Refills: 0 | Status: SHIPPED | OUTPATIENT
Start: 2023-05-02 | End: 2023-06-30 | Stop reason: SDUPTHER

## 2023-05-02 RX ORDER — LORAZEPAM 0.5 MG/1
0.5 TABLET ORAL DAILY PRN
Qty: 30 TABLET | Refills: 0 | Status: SHIPPED | OUTPATIENT
Start: 2023-05-02 | End: 2023-06-04 | Stop reason: SDUPTHER

## 2023-05-02 NOTE — TELEPHONE ENCOUNTER
Last OV 11/2/22  Tramadol Filled on 3/6/23  #120/30  Lorazepam filled on 4/4/23  #30/30  Pdmp Checked with No Red Flags

## 2023-06-05 RX ORDER — LORAZEPAM 0.5 MG/1
0.5 TABLET ORAL DAILY PRN
Qty: 30 TABLET | Refills: 0 | Status: SHIPPED | OUTPATIENT
Start: 2023-06-05 | End: 2023-07-05 | Stop reason: SDUPTHER

## 2023-07-11 ENCOUNTER — TELEMEDICINE (OUTPATIENT)
Dept: PSYCHIATRY | Facility: HOSPITAL | Age: 48
End: 2023-07-11
Attending: PSYCHIATRY & NEUROLOGY
Payer: COMMERCIAL

## 2023-07-11 DIAGNOSIS — F33.1 MAJOR DEPRESSIVE DISORDER, RECURRENT EPISODE, MODERATE (CMS/HCC): ICD-10-CM

## 2023-07-11 DIAGNOSIS — F41.1 GAD (GENERALIZED ANXIETY DISORDER): ICD-10-CM

## 2023-07-11 PROCEDURE — 99213 OFFICE O/P EST LOW 20 MIN: CPT | Mod: 95 | Performed by: PSYCHIATRY & NEUROLOGY

## 2023-07-11 NOTE — PROGRESS NOTES
"Sofia Valente is a 48 y.o. female who presents for Follow-up and Med Management.    Telemedicine Service  This visit occurred via telemedicine services with the consent of the patient.  Psychiat[place] location[place] in [place]  [place] location: [place]  Those who participated in the encounter: Patient, Psychiatrist  Able to reach patient at : [phone]     Identified patient by name and birthdate, introduced myself and location, confirmed patient's location and private setting.  The details regarding Epic video visit MyChart platform including letting patient know the video conference platform is private confidential secure and real-time.  The conversation is not being recorded.  No other participants in the video conference beyond noted above.  Informed that a summary of our appointment would be entered into the medical record and [facility] with bill for the session through telemedicine billing codes.  Patient informed of right to choose form of delivery service, including right to refuse video session.  Patient consents to behavioral health treatment      \"OK\"    Anxiety had peeked in the last few weeks  Low mood  Taking lorazepam at night    Picked up long awaited new vehicle with adaptation in [place]  3 hours of training then it broke down during modification process  Last month has been dependent on  for transportation    Had the release surgery in April, limited success. Needing to consider an additional surgery to adjust tendon. Less painful    Not motivated to complete tasks  Went to visit parents last weekend, very therapeutic, they are staying in their home, caregivers working out for now.    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     s/p multiple recent losses.          Psychiatric ROS:   Sleep:Good and Fair  Appetite: Good  Libido: Fair  Exercise: Works with caregivers to optimize movement  ETOH/Substances:     Medical Review of Systems  Review of Systems    PMSH: No changes were made to non-psychiatric medications/allergies/medical history.     Risk/Benefit/side Effects discussed regarding the following medications: See below  PDMP Queried: Yes    Allergies:   Allergies   Allergen Reactions   • Gluten    • Gluten Protein GI intolerance   • Morphine    • Penicillins GI intolerance       Current Outpatient Medications:   •  betamethasone valerate (VALISONE) 0.1 % cream, Apply topically 2 (two) times a day., , Disp: 45 g, Rfl: 1  •  cholecalciferol, vitamin D3, 5,000 unit (125 mcg) capsule, Take 5,000 Units by mouth daily., , Disp: , Rfl:   •  citalopram (CeleXA) 10 mg tablet, Take 1 tablet (10 mg total) by mouth daily., , Disp: 90 tablet, Rfl: 1  •  LORazepam (ATIVAN) 0.5 mg tablet, Take 1 tablet (0.5 mg total) by mouth daily as needed for anxiety for up to 15 doses., , Disp: 30 tablet, Rfl: 0  •  multivit with minerals/lutein (MULTIVITAMIN 50 PLUS ORAL), multivitamin, , Disp: , Rfl:   •  oxybutynin (DITROPAN) 5 mg tablet, TAKE 1 TABLET(5 MG) BY MOUTH THREE TIMES DAILY Strength: 5 mg, , Disp: 270 tablet, Rfl: 3  •  traMADoL (ULTRAM) 50 mg tablet, Take 1 tablet (50 mg total) by mouth every 6 (six) hours as needed for moderate pain., , Disp: 120 tablet, Rfl: 0         Objective     Physical Exam    There were no vitals taken for this visit.    MENTAL STATUS EXAM  Appearance: well groomed  Gait and Motor: no abnormal movements  Speech: normal rate/rhythm/volume and fluent  Mood: 'okay'  Affect: constricted  Associations: coherent  Thought Process: goal-directed  Thought Content: no auditory  or visual hallucinations. and appropriate to situation  Suicidality/Homicidality: denies and no thoughts of harm toward child/children  Judgement/Insight: good  Orientation: day, month and year  Memory: recalls recent events and recalls remote events  Attention: alert  Knowledge: normal  Language: normal        [place] Suicide Severity Rating Scale:  Not indicated          Labs  No new labs.        Brief Psychiatric Formulation: Patient is a 48-year-old  G0, P0 female with history of FSHD (facioscapulohumeral muscular dystrophy), celiac disease,Crohn's disease, arthritis, chronic pain status post bone stimulator removal with mood and anxiety symptoms consistent with MDD recurrent moderate and PARISA. No history of dangerousness to self or others.  Strengths include intelligence, good family support.     Patient seen for PEV 9/9/2020, trial of Prozac with significant activation and inability to tolerate after only several doses, prozac discontinued.  Trial of citalopram 5mg initiated mid September 2020, increased citalopram to 10 mg  March 2021. Patient has tolerated and benefited from same.  Zolpidem prescribed by PCP.  April, sleep broken, Zolpidem discontinued and lorazepam adjusted to qhs to target sleep. Consider gabapentin augmentation in the future.     Interval history with some difficulty completing tasks though functioning across domains.  Tendon release surgery of limited success, reviewing options for revision.  Also struggling with decreased access to transportation.  Long awaited vehicle adapted for patient's needs became inoperable almost immediately after final modifications.  Patient optimistic that with delivery of vehicle, patient will have better access to activities outside the home.  Also reflecting on choices of where to live, significant distance between she and her parents for whom she is an important support.  Continues to tolerate and benefit from citalopram.  No dangerousness to self  or others.       Plan:  -Continue citalopram 10 mg p.o. daily, trial initiated September 2020, last increased March 2021, history of sensitivity to activation with trial of Prozac, monitor sexual side effects  -Patient with new insurance, awaiting clarification as to mail order, for now we will send 90-day supply with refill to retail pharmacy, okay to send to mail order if retail only providing 30-day prescriptions  -Continue lorazepam 0.5 mg p.o. daily as needed, adjusted to nightly as needed, taking most nights, tolerating without difficulty, no misuse  -Continue vitamin D replacement, follow-up with PCP as scheduled  -Continue follow-up with therapist Dorene England LCSW, seeing as needed, available as needed  -Follow-up psychiatry visit 4  Months  Based on [place] Suicide Screen and current clinical assessment, patient is determined Low Risk.  Suicide Risk/Suicidal Ideation will not be added to patient's treatment plan.     Patient to F/U with treatment goals as outlined in treatment plan.  Patient to F/U with local ED or call 911 should SI/HI arise.    Visit Diagnosis:    ICD-10-CM ICD-9-CM   1. Major depressive disorder, recurrent episode, moderate (CMS/HCC)  F33.1 296.32   2. PARISA (generalized anxiety disorder)  F41.1 300.02       I spent 24 minutes on this date of service performing the following activities: obtaining history, documenting and providing counseling and education.  Devora Todd MD @ 4:41 PM

## 2023-07-31 DIAGNOSIS — Z86.718 HISTORY OF BLOOD CLOTS: ICD-10-CM

## 2023-07-31 DIAGNOSIS — M79.89 SWELLING OF ARM: Primary | ICD-10-CM

## 2023-08-02 LAB
25(OH)D3 SERPL-MCNC: 61 NG/ML (ref 30–100)
ALBUMIN SERPL-MCNC: 4.4 G/DL (ref 3.6–5.1)
ALBUMIN/GLOB SERPL: 1.6 (CALC) (ref 1–2.5)
ALP SERPL-CCNC: 46 U/L (ref 31–125)
ALT SERPL-CCNC: 18 U/L (ref 6–29)
AST SERPL-CCNC: 22 U/L (ref 10–35)
BASOPHILS # BLD AUTO: 21 CELLS/UL (ref 0–200)
BASOPHILS NFR BLD AUTO: 0.5 %
BILIRUB SERPL-MCNC: 0.4 MG/DL (ref 0.2–1.2)
BUN SERPL-MCNC: 7 MG/DL (ref 7–25)
BUN/CREAT SERPL: 32 (CALC) (ref 6–22)
CALCIUM SERPL-MCNC: 9.4 MG/DL (ref 8.6–10.2)
CHLORIDE SERPL-SCNC: 101 MMOL/L (ref 98–110)
CHOLEST SERPL-MCNC: 195 MG/DL
CHOLEST/HDLC SERPL: 2.3 (CALC)
CO2 SERPL-SCNC: 32 MMOL/L (ref 20–32)
CREAT SERPL-MCNC: 0.22 MG/DL (ref 0.5–0.99)
EGFRCR SERPLBLD CKD-EPI 2021: 140 ML/MIN/1.73M2
EOSINOPHIL # BLD AUTO: 50 CELLS/UL (ref 15–500)
EOSINOPHIL NFR BLD AUTO: 1.2 %
ERYTHROCYTE [DISTWIDTH] IN BLOOD BY AUTOMATED COUNT: 12.2 % (ref 11–15)
GLOBULIN SER CALC-MCNC: 2.7 G/DL (CALC) (ref 1.9–3.7)
GLUCOSE SERPL-MCNC: 84 MG/DL (ref 65–99)
HCT VFR BLD AUTO: 40.5 % (ref 35–45)
HDLC SERPL-MCNC: 85 MG/DL
HGB BLD-MCNC: 13.8 G/DL (ref 11.7–15.5)
LDLC SERPL CALC-MCNC: 95 MG/DL (CALC)
LYMPHOCYTES # BLD AUTO: 1037 CELLS/UL (ref 850–3900)
LYMPHOCYTES NFR BLD AUTO: 24.7 %
MCH RBC QN AUTO: 32.5 PG (ref 27–33)
MCHC RBC AUTO-ENTMCNC: 34.1 G/DL (ref 32–36)
MCV RBC AUTO: 95.3 FL (ref 80–100)
MONOCYTES # BLD AUTO: 420 CELLS/UL (ref 200–950)
MONOCYTES NFR BLD AUTO: 10 %
NEUTROPHILS # BLD AUTO: 2671 CELLS/UL (ref 1500–7800)
NEUTROPHILS NFR BLD AUTO: 63.6 %
NONHDLC SERPL-MCNC: 110 MG/DL (CALC)
PLATELET # BLD AUTO: 164 THOUSAND/UL (ref 140–400)
PMV BLD REES-ECKER: 10.2 FL (ref 7.5–12.5)
POTASSIUM SERPL-SCNC: 4.4 MMOL/L (ref 3.5–5.3)
PROT SERPL-MCNC: 7.1 G/DL (ref 6.1–8.1)
RBC # BLD AUTO: 4.25 MILLION/UL (ref 3.8–5.1)
SODIUM SERPL-SCNC: 140 MMOL/L (ref 135–146)
TRIGL SERPL-MCNC: 61 MG/DL
TSH SERPL-ACNC: 1.22 MIU/L
WBC # BLD AUTO: 4.2 THOUSAND/UL (ref 3.8–10.8)

## 2023-08-17 ENCOUNTER — OFFICE VISIT (OUTPATIENT)
Dept: PRIMARY CARE | Facility: CLINIC | Age: 48
End: 2023-08-17
Payer: COMMERCIAL

## 2023-08-17 VITALS
SYSTOLIC BLOOD PRESSURE: 105 MMHG | HEART RATE: 89 BPM | OXYGEN SATURATION: 95 % | DIASTOLIC BLOOD PRESSURE: 68 MMHG | TEMPERATURE: 98.8 F

## 2023-08-17 DIAGNOSIS — K50.919 CROHN'S DISEASE WITH COMPLICATION, UNSPECIFIED GASTROINTESTINAL TRACT LOCATION (CMS/HCC): ICD-10-CM

## 2023-08-17 DIAGNOSIS — R30.0 DYSURIA: Primary | ICD-10-CM

## 2023-08-17 DIAGNOSIS — G71.02 FSHD (FACIOSCAPULOHUMERAL MUSCULAR DYSTROPHY) (CMS/HCC): ICD-10-CM

## 2023-08-17 DIAGNOSIS — R21 RASH AND NONSPECIFIC SKIN ERUPTION: ICD-10-CM

## 2023-08-17 DIAGNOSIS — G82.52 QUADRIPLEGIA, C1-C4 INCOMPLETE (CMS/HCC): ICD-10-CM

## 2023-08-17 DIAGNOSIS — D69.6 THROMBOCYTOPENIA (CMS/HCC): ICD-10-CM

## 2023-08-17 PROCEDURE — 99214 OFFICE O/P EST MOD 30 MIN: CPT | Performed by: INTERNAL MEDICINE

## 2023-08-17 RX ORDER — NAPROXEN 500 MG/1
500 TABLET ORAL 2 TIMES DAILY WITH MEALS
Qty: 60 TABLET | Refills: 0 | Status: SHIPPED | OUTPATIENT
Start: 2023-08-17 | End: 2023-09-15

## 2023-08-17 RX ORDER — NYSTATIN 100000 U/G
CREAM TOPICAL 2 TIMES DAILY
Qty: 30 G | Refills: 1 | Status: SHIPPED | OUTPATIENT
Start: 2023-08-17 | End: 2024-02-13

## 2023-08-18 ENCOUNTER — HOSPITAL ENCOUNTER (OUTPATIENT)
Dept: RADIOLOGY | Facility: CLINIC | Age: 48
Discharge: HOME | End: 2023-08-18
Attending: INTERNAL MEDICINE
Payer: COMMERCIAL

## 2023-08-18 DIAGNOSIS — M79.89 SWELLING OF ARM: ICD-10-CM

## 2023-08-18 DIAGNOSIS — Z86.718 HISTORY OF BLOOD CLOTS: ICD-10-CM

## 2023-08-18 PROCEDURE — 93970 EXTREMITY STUDY: CPT

## 2023-08-31 PROBLEM — R30.0 DYSURIA: Status: ACTIVE | Noted: 2023-08-31

## 2023-08-31 PROBLEM — R21 RASH AND NONSPECIFIC SKIN ERUPTION: Status: ACTIVE | Noted: 2023-08-31

## 2023-09-14 NOTE — TELEPHONE ENCOUNTER
This medication was last filled by Fazal ortega Jonathan T, PA C.     Does pcp want to refill? Please advise.

## 2023-09-15 RX ORDER — NAPROXEN 500 MG/1
TABLET ORAL
Qty: 60 TABLET | Refills: 0 | Status: SHIPPED | OUTPATIENT
Start: 2023-09-15 | End: 2023-09-18 | Stop reason: SDUPTHER

## 2023-10-17 RX ORDER — NAPROXEN 500 MG/1
TABLET ORAL
Qty: 60 TABLET | Refills: 0 | Status: SHIPPED | OUTPATIENT
Start: 2023-10-17 | End: 2023-12-14 | Stop reason: SDUPTHER

## 2023-10-30 RX ORDER — CITALOPRAM 10 MG/1
10 TABLET ORAL DAILY
Qty: 90 TABLET | Refills: 1 | Status: SHIPPED | OUTPATIENT
Start: 2023-10-30 | End: 2024-04-04

## 2023-10-30 RX ORDER — OXYBUTYNIN CHLORIDE 5 MG/1
TABLET ORAL
Qty: 270 TABLET | Refills: 3 | Status: SHIPPED | OUTPATIENT
Start: 2023-10-30 | End: 2024-12-17

## 2023-10-30 RX ORDER — LORAZEPAM 0.5 MG/1
0.5 TABLET ORAL DAILY PRN
Qty: 90 TABLET | Refills: 0 | Status: SHIPPED | OUTPATIENT
Start: 2023-10-30 | End: 2024-01-22

## 2023-11-09 RX ORDER — BETAMETHASONE VALERATE 1 MG/G
CREAM TOPICAL 2 TIMES DAILY
Qty: 45 G | Refills: 1 | Status: SHIPPED | OUTPATIENT
Start: 2023-11-09 | End: 2024-03-19 | Stop reason: SDUPTHER

## 2023-12-14 RX ORDER — NAPROXEN 500 MG/1
TABLET ORAL
Qty: 60 TABLET | Refills: 0 | Status: SHIPPED | OUTPATIENT
Start: 2023-12-14 | End: 2024-01-11

## 2024-01-11 RX ORDER — NAPROXEN 500 MG/1
TABLET ORAL
Qty: 60 TABLET | Refills: 1 | Status: SHIPPED | OUTPATIENT
Start: 2024-01-11 | End: 2024-02-26

## 2024-01-19 NOTE — TELEPHONE ENCOUNTER
Lorazepam 0.5 mg  Filled on 10/31/2023  # 90/90  PDMP checked with no red flags    Last Office Visit: 8/17/2023   Last Consult Visit: Visit date not found  Last Telemedicine Visit: 5/26/2022 Jaycob Peña,     Next Appointment: 3/27/2024

## 2024-01-22 RX ORDER — LORAZEPAM 0.5 MG/1
TABLET ORAL
Qty: 90 TABLET | Refills: 0 | Status: SHIPPED | OUTPATIENT
Start: 2024-01-22 | End: 2024-04-15 | Stop reason: SDUPTHER

## 2024-02-26 RX ORDER — NAPROXEN 500 MG/1
TABLET ORAL
Qty: 60 TABLET | Refills: 1 | Status: SHIPPED | OUTPATIENT
Start: 2024-02-26 | End: 2024-07-05 | Stop reason: SDUPTHER

## 2024-03-19 RX ORDER — TRAMADOL HYDROCHLORIDE 50 MG/1
50 TABLET ORAL EVERY 6 HOURS PRN
Qty: 120 TABLET | Refills: 0 | Status: SHIPPED | OUTPATIENT
Start: 2024-03-19 | End: 2024-07-09 | Stop reason: SDUPTHER

## 2024-03-19 RX ORDER — BETAMETHASONE VALERATE 1 MG/G
CREAM TOPICAL 2 TIMES DAILY
Qty: 45 G | Refills: 1 | Status: SHIPPED | OUTPATIENT
Start: 2024-03-19 | End: 2024-04-18

## 2024-03-19 NOTE — TELEPHONE ENCOUNTER
Tramadol 50 mg   Filled on 7/10/2023  # 120/30  PDMP checked with no red flags    Medicine Refill Request    Last Office Visit: 8/17/2023   Last Consult Visit: Visit date not found  Last Telemedicine Visit: 5/26/2022 Jaycob Peña,     Next Appointment: 4/3/2024

## 2024-03-28 LAB — COLOGUARD: NEGATIVE

## 2024-04-03 ENCOUNTER — OFFICE VISIT (OUTPATIENT)
Dept: PRIMARY CARE | Facility: CLINIC | Age: 49
End: 2024-04-03
Payer: COMMERCIAL

## 2024-04-03 VITALS
DIASTOLIC BLOOD PRESSURE: 88 MMHG | TEMPERATURE: 98.1 F | RESPIRATION RATE: 18 BRPM | HEART RATE: 86 BPM | OXYGEN SATURATION: 96 % | SYSTOLIC BLOOD PRESSURE: 120 MMHG

## 2024-04-03 DIAGNOSIS — Z00.00 ENCOUNTER FOR GENERAL ADULT MEDICAL EXAMINATION WITHOUT ABNORMAL FINDINGS: Primary | ICD-10-CM

## 2024-04-03 DIAGNOSIS — K90.0 CELIAC DISEASE: ICD-10-CM

## 2024-04-03 DIAGNOSIS — G82.52 QUADRIPLEGIA, C1-C4 INCOMPLETE (CMS/HCC): ICD-10-CM

## 2024-04-03 DIAGNOSIS — E55.9 VITAMIN D DEFICIENCY, UNSPECIFIED: ICD-10-CM

## 2024-04-03 DIAGNOSIS — K50.919 CROHN'S DISEASE WITH COMPLICATION, UNSPECIFIED GASTROINTESTINAL TRACT LOCATION (CMS/HCC): ICD-10-CM

## 2024-04-03 DIAGNOSIS — G71.02 FSHD (FACIOSCAPULOHUMERAL MUSCULAR DYSTROPHY) (CMS/HCC): ICD-10-CM

## 2024-04-03 PROCEDURE — 99396 PREV VISIT EST AGE 40-64: CPT | Performed by: INTERNAL MEDICINE

## 2024-04-03 RX ORDER — NYSTATIN 100000 U/G
CREAM TOPICAL
COMMUNITY
End: 2025-03-11 | Stop reason: SDUPTHER

## 2024-04-03 ASSESSMENT — PATIENT HEALTH QUESTIONNAIRE - PHQ9: SUM OF ALL RESPONSES TO PHQ9 QUESTIONS 1 & 2: 0

## 2024-04-03 NOTE — PROGRESS NOTES
Subjective     Patient ID: Sofia Valente is a 49 y.o. female.    Presents today for PE    Diet-stable, balance celiac  Exercise-remains active-MD limits  Sleep-stable  Mood/anx-overall doing ok    Chronic -  Urinary incont- ongoing- slowly progresses    R shoulder throacic neck pain    New-                  Review of Systems   Constitutional: Negative for activity change, appetite change, fatigue and fever.   HENT: Negative.    Eyes: Negative for pain and visual disturbance.   Respiratory: Negative for cough, chest tightness and shortness of breath.    Cardiovascular: Negative for chest pain and palpitations.   Endocrine: Negative.    Musculoskeletal: Positive for gait problem, myalgias and neck pain.   Neurological: Positive for weakness. Negative for dizziness and headaches.       Current Outpatient Medications   Medication Sig Dispense Refill   • betamethasone valerate (VALISONE) 0.1 % cream Apply topically 2 (two) times a day. 45 g 1   • cholecalciferol, vitamin D3, 5,000 unit (125 mcg) capsule Take 5,000 Units by mouth daily.     • LORazepam (ATIVAN) 0.5 mg tablet TAKE 1 TABLET DAILY AS NEEDED FOR ANXIETY 90 tablet 0   • multivit with minerals/lutein (MULTIVITAMIN 50 PLUS ORAL) multivitamin     • naproxen (NAPROSYN) 500 mg tablet TAKE 1 TABLET(500 MG) BY MOUTH TWICE DAILY WITH MEALS 60 tablet 1   • nystatin (MYCOSTATIN) 100,000 unit/gram cream APPLY TOPICALLY TO THE AFFECTED AREA TWICE DAILY     • oxybutynin (DITROPAN) 5 mg tablet TAKE 1 TABLET(5 MG) BY MOUTH THREE TIMES DAILY Strength: 5 mg 270 tablet 3   • traMADoL (ULTRAM) 50 mg tablet Take 1 tablet (50 mg total) by mouth every 6 (six) hours as needed for moderate pain. 120 tablet 0   • citalopram (celeXA) 10 mg tablet TAKE 1 TABLET DAILY 90 tablet 3     No current facility-administered medications for this visit.     Past Medical History:   Diagnosis Date   • CD (Crohn's disease) (CMS/[facility])    • Celiac disease    • FSHD (facioscapulohumeral muscular  Health Maintenance Due   Topic Date Due   • Influenza Vaccine (1) 08/01/2018   • Cervical Cancer Screening  01/21/2019       Patient is due for topics as listed above but is not proceeding with Immunization(s) Influenza at this time. Orders placed for Cervical cancer screening             dystrophy) (CMS/HCC)    • Tibial fracture     right tibia transverse fracture   • Urinary incontinence      Family History   Problem Relation Age of Onset   • Celiac disease Biological Mother    • Muscular dystrophy Biological Mother    • Stroke Biological Mother    • No Known Problems Biological Father    • Muscular dystrophy Biological Sister    • Colon cancer Paternal Grandmother    • Lymphoma Paternal Grandfather    • Muscular dystrophy Maternal Grandmother      Past Surgical History:   Procedure Laterality Date   • APPENDECTOMY     • BOWEL RESECTION     • OTHER SURGICAL HISTORY Right     Scapulothoracic fusion      Social History     Socioeconomic History   • Marital status:      Spouse name: Not on file   • Number of children: Not on file   • Years of education: Not on file   • Highest education level: Not on file   Occupational History   • Not on file   Tobacco Use   • Smoking status: Never   • Smokeless tobacco: Never   Substance and Sexual Activity   • Alcohol use: Never   • Drug use: Never   • Sexual activity: Yes     Partners: Male   Other Topics Concern   • Not on file   Social History Narrative   • Not on file     Social Determinants of Health     Financial Resource Strain: Not on file   Food Insecurity: Not on file   Transportation Needs: Not on file   Physical Activity: Not on file   Stress: Not on file   Social Connections: Not on file   Intimate Partner Violence: Not on file   Housing Stability: Not on file     Allergies   Allergen Reactions   • Gluten    • Gluten Protein GI intolerance   • Morphine    • Copyg-Gonrz-Milsyje-Pramoxine      Other reaction(s): Unknown   • Penicillins GI intolerance       Objective     Vitals:    04/03/24 1409   BP: 120/88   Pulse: 86   Resp: 18   Temp: 36.7 °C (98.1 °F)   SpO2: 96%     There is no height or weight on file to calculate BMI.    Physical Exam  Vitals and nursing note reviewed.   Constitutional:       Appearance: She is well-developed.   HENT:       Head: Normocephalic and atraumatic.   Eyes:      Pupils: Pupils are equal, round, and reactive to light.   Cardiovascular:      Rate and Rhythm: Normal rate and regular rhythm.   Pulmonary:      Effort: Pulmonary effort is normal.      Breath sounds: Normal breath sounds.   Musculoskeletal:      Cervical back: Normal range of motion.   Skin:     General: Skin is warm and dry.   Neurological:      Mental Status: She is alert and oriented to person, place, and time.         Assessment/Plan   Problem List Items Addressed This Visit        Nervous    Quadriplegia, C1-C4 incomplete (CMS/[facility])     Sec t oFSHD              Digestive    Celiac disease     Follows diet- stable            Musculoskeletal    FSHD (facioscapulohumeral muscular dystrophy) (CMS/[facility])     UTD specialist team- seeing some changes over past months - mild follows with team            Endocrine/Metabolic    Vitamin D deficiency, unspecified     Follow labs          Relevant Orders    Vitamin D 25 hydroxy       Immune    CD (Crohn's disease) (CMS/[facility])     No notable issues         Relevant Orders    CBC and differential    Comprehensive metabolic panel    Lipid panel    Vitamin D 25 hydroxy    TSH w reflex FT4       Other    Encounter for general adult medical examination without abnormal findings - Primary     VAX-reviewed  LABS-ordered  Specialist-  Diet -well balanced in moderation  Exercise - Maintain regular activity  Rec- RTO annually for PE and PRN           Relevant Orders    CBC and differential    Comprehensive metabolic panel    Lipid panel    Vitamin D 25 hydroxy    TSH w reflex FT4     Orders Placed This Encounter   Procedures   • CBC and differential     Standing Status:   Future     Standing Expiration Date:   4/3/2025     Order Specific Question:   Release to patient     Answer:   Immediate [1]   • Comprehensive metabolic panel     Standing Status:   Future     Standing Expiration Date:   4/3/2025     Order Specific Question:   Release to  patient     Answer:   Immediate [1]   • Lipid panel     Standing Status:   Future     Standing Expiration Date:   4/3/2025     Order Specific Question:   Release to patient     Answer:   Immediate [1]   • Vitamin D 25 hydroxy     Standing Status:   Future     Standing Expiration Date:   4/3/2025     Order Specific Question:   Release to patient     Answer:   Immediate [1]   • TSH w reflex FT4     Standing Status:   Future     Standing Expiration Date:   4/3/2025     Order Specific Question:   Release to patient     Answer:   Immediate [1]

## 2024-04-04 RX ORDER — CITALOPRAM 10 MG/1
10 TABLET ORAL DAILY
Qty: 90 TABLET | Refills: 3 | Status: SHIPPED | OUTPATIENT
Start: 2024-04-04 | End: 2024-11-04

## 2024-04-12 ASSESSMENT — ENCOUNTER SYMPTOMS
PALPITATIONS: 0
DIZZINESS: 0
SHORTNESS OF BREATH: 0
NECK PAIN: 1
HEADACHES: 0
MYALGIAS: 1
FATIGUE: 0
EYE PAIN: 0
FEVER: 0
CHEST TIGHTNESS: 0
COUGH: 0
APPETITE CHANGE: 0
ACTIVITY CHANGE: 0
ENDOCRINE NEGATIVE: 1
WEAKNESS: 1

## 2024-04-12 NOTE — ASSESSMENT & PLAN NOTE
VAX-reviewed  LABS-ordered  Specialist-  Diet -well balanced in moderation  Exercise - Maintain regular activity  Rec- RTO annually for PE and PRN

## 2024-04-16 RX ORDER — LORAZEPAM 0.5 MG/1
0.5 TABLET ORAL DAILY
Qty: 90 TABLET | Refills: 0 | Status: SHIPPED | OUTPATIENT
Start: 2024-04-16 | End: 2024-07-07 | Stop reason: SDUPTHER

## 2024-04-16 NOTE — TELEPHONE ENCOUNTER
Lorazepam 0.5 mg   Filled on 1/24/2024  # 90/90  PDMP checked with 1 red flag  Medicine Refill Request    Last Office Visit: 4/3/2024   Last Consult Visit: Visit date not found  Last Telemedicine Visit: 5/26/2022 Jaycob Peña DO    Next Appointment: 11/4/2024

## 2024-06-19 RX ORDER — KETOCONAZOLE 20 MG/G
CREAM TOPICAL DAILY
Qty: 30 G | Refills: 1 | Status: SHIPPED | OUTPATIENT
Start: 2024-06-19 | End: 2024-07-19

## 2024-07-05 RX ORDER — NAPROXEN 500 MG/1
TABLET ORAL
Qty: 60 TABLET | Refills: 1 | Status: SHIPPED | OUTPATIENT
Start: 2024-07-05 | End: 2024-09-04

## 2024-08-02 ENCOUNTER — DOCUMENTATION (OUTPATIENT)
Dept: PSYCHIATRY | Facility: HOSPITAL | Age: 49
End: 2024-08-02
Payer: COMMERCIAL

## 2024-08-02 NOTE — DISCHARGE SUMMARY
Discharge Summary     Patient Name: Sofia Valente  : 1975  Treatment start date: 21  Treatment end date: 24    Discharge Type: Psychiatry   Discharge Reason: Incomplete Discharge    Reason for admission and treatment: Medication Management  Services offered and response to treatment:  Pt did not follow up to treatment despite multiple calls and letter sent 6/10/24. Pt was provided [place], [place], [place] and [facility] Crisis Numbers in the letter and was encouraged to f/u with [facility] or local emergency room should any SI/HI arise or symptoms worsen. Pt was encouraged to contact [facility] at any time if needed to discuss treatment needs and [facility] availability.  As a result of lapse in treatment and lack of return correspondence, pt’s chart will be moved to closed status.    Client status: Condition upon discharge: Unknown secondary to no patient f/up  Clinical concerns to be addressed in continued care: Unknown secondary to no patient f/up     Aftercare appointments: Unknown secondary to no patient f/up  Special Instructions: None     Medical Follow Up needed?  No  Is patient receiving Medicated Assisted Treatment? No    Mental Health Crisis Support:    [facility] Crisis Number: [phone]   [facility] Crisis Number: [phone]   [facility] Crisis Number: [phone]   [facility] Crisis Number: [phone]      In case of Mental Health Crisis, go to the closest Emergency Department, call , or contact the National Suicide Prevention Hotline at: 1-552.578.3450.  You may also call, text or chat the National Suicide Prevention Hotline at .       Other Support Agencies:  [place] National [place] of Mental Illness: 574.404.3579    [place] Advocacy System: [phone]    Depression & Bipolar [place]: [phone]      Patient offered a copy of plan? No, due to no pt f/up.    Patient provided a copy?  No, due to no pt f/up       VANESSA [place] @ 11:54  AM

## 2024-09-04 RX ORDER — NAPROXEN 500 MG/1
TABLET ORAL
Qty: 60 TABLET | Refills: 1 | Status: SHIPPED | OUTPATIENT
Start: 2024-09-04 | End: 2024-09-27 | Stop reason: SDUPTHER

## 2024-09-16 ENCOUNTER — TELEPHONE (OUTPATIENT)
Dept: PRIMARY CARE | Facility: CLINIC | Age: 49
End: 2024-09-16
Payer: COMMERCIAL

## 2024-09-16 NOTE — TELEPHONE ENCOUNTER
Zohreh called from [employer] to follow up on a request for an order for a wheelchair repair. They sent in a request on 9/5. Please call back at [phone].

## 2024-09-27 NOTE — TELEPHONE ENCOUNTER
Tramadol 50 mg   Filled on 7/9/2024  # 120/30  PDMP checked with 1 red flag  Medicine Refill Request    Last Office Visit: 4/3/2024   Last Consult Visit: Visit date not found  Last Telemedicine Visit: 5/26/2022 Jaycob Peña,     Next Appointment: 11/4/2024

## 2024-09-27 NOTE — TELEPHONE ENCOUNTER
Lorazepam 0.5 mg   Filled on 7/12/2024  # 90/90  PDMP checked with 1 red flag  Medicine Refill Request    Last Office Visit: 4/3/2024   Last Consult Visit: Visit date not found  Last Telemedicine Visit: 5/26/2022 Jaycob Peña DO    Next Appointment: 11/4/2024

## 2024-09-30 RX ORDER — LORAZEPAM 0.5 MG/1
0.5 TABLET ORAL DAILY
Qty: 90 TABLET | Refills: 0 | Status: SHIPPED | OUTPATIENT
Start: 2024-09-30 | End: 2025-01-05 | Stop reason: SDUPTHER

## 2024-09-30 RX ORDER — TRAMADOL HYDROCHLORIDE 50 MG/1
50 TABLET ORAL EVERY 6 HOURS PRN
Qty: 120 TABLET | Refills: 0 | Status: SHIPPED | OUTPATIENT
Start: 2024-09-30 | End: 2025-01-09 | Stop reason: SDUPTHER

## 2024-09-30 RX ORDER — NAPROXEN 500 MG/1
TABLET ORAL
Qty: 60 TABLET | Refills: 1 | Status: SHIPPED | OUTPATIENT
Start: 2024-09-30 | End: 2024-12-30 | Stop reason: SDUPTHER

## 2024-10-31 SDOH — ECONOMIC STABILITY: FOOD INSECURITY: WITHIN THE PAST 12 MONTHS, YOU WORRIED THAT YOUR FOOD WOULD RUN OUT BEFORE YOU GOT MONEY TO BUY MORE.: NEVER TRUE

## 2024-10-31 SDOH — ECONOMIC STABILITY: FOOD INSECURITY: WITHIN THE PAST 12 MONTHS, THE FOOD YOU BOUGHT JUST DIDN'T LAST AND YOU DIDN'T HAVE MONEY TO GET MORE.: NEVER TRUE

## 2024-10-31 SDOH — ECONOMIC STABILITY: INCOME INSECURITY: IN THE LAST 12 MONTHS, WAS THERE A TIME WHEN YOU WERE NOT ABLE TO PAY THE MORTGAGE OR RENT ON TIME?: NO

## 2024-10-31 SDOH — ECONOMIC STABILITY: TRANSPORTATION INSECURITY
IN THE PAST 12 MONTHS, HAS LACK OF TRANSPORTATION KEPT YOU FROM MEETINGS, WORK, OR FROM GETTING THINGS NEEDED FOR DAILY LIVING?: NO

## 2024-10-31 SDOH — ECONOMIC STABILITY: TRANSPORTATION INSECURITY
IN THE PAST 12 MONTHS, HAS THE LACK OF TRANSPORTATION KEPT YOU FROM MEDICAL APPOINTMENTS OR FROM GETTING MEDICATIONS?: NO

## 2024-10-31 ASSESSMENT — SOCIAL DETERMINANTS OF HEALTH (SDOH): IN THE PAST 12 MONTHS, HAS THE ELECTRIC, GAS, OIL, OR WATER COMPANY THREATENED TO SHUT OFF SERVICE IN YOUR HOME?: NO

## 2024-11-04 ENCOUNTER — OFFICE VISIT (OUTPATIENT)
Dept: PRIMARY CARE | Facility: CLINIC | Age: 49
End: 2024-11-04
Payer: COMMERCIAL

## 2024-11-04 VITALS
HEART RATE: 62 BPM | RESPIRATION RATE: 18 BRPM | SYSTOLIC BLOOD PRESSURE: 132 MMHG | DIASTOLIC BLOOD PRESSURE: 88 MMHG | TEMPERATURE: 98.2 F | OXYGEN SATURATION: 97 %

## 2024-11-04 DIAGNOSIS — F32.A DEPRESSION, UNSPECIFIED DEPRESSION TYPE: ICD-10-CM

## 2024-11-04 DIAGNOSIS — Z00.00 ENCOUNTER FOR GENERAL ADULT MEDICAL EXAMINATION WITHOUT ABNORMAL FINDINGS: Primary | ICD-10-CM

## 2024-11-04 DIAGNOSIS — G71.02 FSHD (FACIOSCAPULOHUMERAL MUSCULAR DYSTROPHY) (CMS/HCC): ICD-10-CM

## 2024-11-04 PROBLEM — M19.031 ARTHRITIS OF RIGHT WRIST: Status: ACTIVE | Noted: 2023-09-26

## 2024-11-04 PROBLEM — M18.9 OSTEOARTHRITIS OF CARPOMETACARPAL (CMC) JOINT OF THUMB: Status: ACTIVE | Noted: 2023-09-26

## 2024-11-04 PROBLEM — M70.30 BURSITIS OF ELBOW: Status: ACTIVE | Noted: 2023-09-27

## 2024-11-04 PROCEDURE — 99396 PREV VISIT EST AGE 40-64: CPT | Performed by: INTERNAL MEDICINE

## 2024-11-04 RX ORDER — VENLAFAXINE HYDROCHLORIDE 37.5 MG/1
37.5 CAPSULE, EXTENDED RELEASE ORAL DAILY
Qty: 30 CAPSULE | Refills: 0 | Status: SHIPPED | OUTPATIENT
Start: 2024-11-04 | End: 2024-12-02

## 2024-11-04 RX ORDER — CEPHALEXIN 500 MG/1
500 CAPSULE ORAL 3 TIMES DAILY
COMMUNITY
Start: 2024-10-28

## 2024-11-04 ASSESSMENT — ENCOUNTER SYMPTOMS
FATIGUE: 0
SHORTNESS OF BREATH: 0
EYE PAIN: 0
CHEST TIGHTNESS: 0
ENDOCRINE NEGATIVE: 1
HEADACHES: 0
FEVER: 0
COUGH: 0
DIZZINESS: 0
WEAKNESS: 0
ACTIVITY CHANGE: 0
PALPITATIONS: 0
APPETITE CHANGE: 0

## 2024-11-04 NOTE — PROGRESS NOTES
Subjective     Patient ID: Sofia Valente is a 49 y.o. female.    Annual Exam (L arm pain, seeing ortho. Wants blood work to check inflammation. Will be having bladder botox hopefully before the end of the year. )    Presents today for PE    Diet-stable  Exercise-limited   Sleep-stable with regimen  Mood/anx-try changing med     Chronic -  Orthopedic- hermelinda- upcoming for shoulder pain L side    New-    Night sweats/felt awful for one week-  Question related to menopause- last period was almost one year ago                  Review of Systems   Constitutional:  Negative for activity change, appetite change, fatigue and fever.   HENT: Negative.     Eyes:  Negative for pain and visual disturbance.   Respiratory:  Negative for cough, chest tightness and shortness of breath.    Cardiovascular:  Negative for chest pain and palpitations.   Endocrine: Negative.    Neurological:  Negative for dizziness, weakness and headaches.       Current Outpatient Medications   Medication Sig Dispense Refill    cholecalciferol, vitamin D3, 5,000 unit (125 mcg) capsule Take 5,000 Units by mouth daily.      LORazepam (ATIVAN) 0.5 mg tablet Take 1 tablet (0.5 mg total) by mouth daily. 90 tablet 0    multivit with minerals/lutein (MULTIVITAMIN 50 PLUS ORAL) multivitamin      naproxen (NAPROSYN) 500 mg tablet TAKE 1 TABLET(500 MG) BY MOUTH TWICE DAILY WITH MEALS 60 tablet 1    nystatin (MYCOSTATIN) 100,000 unit/gram cream APPLY TOPICALLY TO THE AFFECTED AREA TWICE DAILY      oxybutynin (DITROPAN) 5 mg tablet TAKE 1 TABLET(5 MG) BY MOUTH THREE TIMES DAILY Strength: 5 mg 270 tablet 3    traMADoL (ULTRAM) 50 mg tablet Take 1 tablet (50 mg total) by mouth every 6 (six) hours as needed for moderate pain. 120 tablet 0    venlafaxine XR (EFFEXOR XR) 37.5 mg 24 hr capsule Take 1 capsule (37.5 mg total) by mouth daily. 30 capsule 0    betamethasone valerate (VALISONE) 0.1 % cream Apply topically 2 (two) times a day. 45 g 1    cephalexin  (KEFLEX) 500 mg capsule Take 500 mg by mouth 3 (three) times a day. (Patient not taking: Reported on 11/4/2024)       No current facility-administered medications for this visit.     Past Medical History:   Diagnosis Date    CD (Crohn's disease) (CMS/[facility])     Celiac disease     FSHD (facioscapulohumeral muscular dystrophy) (CMS/HCC)     Tibial fracture     right tibia transverse fracture    Urinary incontinence      Family History   Problem Relation Name Age of Onset    Celiac disease Biological Mother      Muscular dystrophy Biological Mother      Stroke Biological Mother      No Known Problems Biological Father      Muscular dystrophy Biological Sister      Colon cancer Paternal Grandmother      Lymphoma Paternal Grandfather      Muscular dystrophy Maternal Grandmother       Past Surgical History   Procedure Laterality Date    Appendectomy      Bowel resection      Other surgical history Right     Scapulothoracic fusion      Social History     Socioeconomic History    Marital status:      Spouse name: Not on file    Number of children: Not on file    Years of education: Not on file    Highest education level: Not on file   Occupational History    Not on file   Tobacco Use    Smoking status: Never    Smokeless tobacco: Never   Substance and Sexual Activity    Alcohol use: Never    Drug use: Never    Sexual activity: Yes     Partners: Male   Other Topics Concern    Not on file   Social History Narrative    Not on file     Social Drivers of Health     Financial Resource Strain: Not on file   Food Insecurity: No Food Insecurity (10/31/2024)    Hunger Vital Sign     Worried About Running Out of Food in the Last Year: Never true     Ran Out of Food in the Last Year: Never true   Transportation Needs: No Transportation Needs (10/31/2024)    PRAPARE - Transportation     Lack of Transportation (Medical): No     Lack of Transportation (Non-Medical): No   Physical Activity: Not on file   Stress: Not on file   Social  Connections: Not on file   Intimate Partner Violence: Not on file   Housing Stability: Low Risk  (10/31/2024)    Housing Stability Vital Sign     Unable to Pay for Housing in the Last Year: No     Number of Times Moved in the Last Year: 0     Homeless in the Last Year: No     Allergies   Allergen Reactions    Gluten     Gluten Protein GI intolerance    Morphine     Ibdzv-Obzvh-Mrpyeki-Pramoxine      Other reaction(s): Unknown    Penicillins GI intolerance       Objective     Vitals:    11/04/24 1147   BP: 132/88   Pulse: 62   Resp: 18   Temp: 36.8 °C (98.2 °F)   SpO2: 97%     There is no height or weight on file to calculate BMI.    Physical Exam  Vitals and nursing note reviewed.   Constitutional:       Appearance: She is well-developed.   HENT:      Head: Normocephalic and atraumatic.   Eyes:      Pupils: Pupils are equal, round, and reactive to light.   Cardiovascular:      Rate and Rhythm: Normal rate and regular rhythm.   Pulmonary:      Effort: Pulmonary effort is normal.      Breath sounds: Normal breath sounds.   Musculoskeletal:      Cervical back: Normal range of motion.   Skin:     General: Skin is warm and dry.   Neurological:      Mental Status: She is alert and oriented to person, place, and time.         Assessment/Plan   Problem List Items Addressed This Visit          Musculoskeletal    FSHD (facioscapulohumeral muscular dystrophy) (CMS/[facility])       Mental Health    Depression     Change from celexa to effexor   37.5 mg low dose trial.         Relevant Medications    venlafaxine XR (EFFEXOR XR) 37.5 mg 24 hr capsule       Other    Encounter for general adult medical examination without abnormal findings - Primary    Relevant Orders    CBC and differential    Comprehensive metabolic panel    Lipid panel    Vitamin D 25 hydroxy    TSH w reflex FT4     Orders Placed This Encounter   Procedures    CBC and differential     Standing Status:   Future     Number of Occurrences:   1     Standing Expiration  Date:   11/4/2025     Order Specific Question:   Release to patient     Answer:   Immediate [1]    Comprehensive metabolic panel     Standing Status:   Future     Number of Occurrences:   1     Standing Expiration Date:   11/4/2025     Order Specific Question:   Release to patient     Answer:   Immediate [1]    Lipid panel     Standing Status:   Future     Number of Occurrences:   1     Standing Expiration Date:   11/4/2025     Order Specific Question:   Release to patient     Answer:   Immediate [1]    Vitamin D 25 hydroxy     Standing Status:   Future     Number of Occurrences:   1     Standing Expiration Date:   11/4/2025     Order Specific Question:   Release to patient     Answer:   Immediate [1]    TSH w reflex FT4     Standing Status:   Future     Number of Occurrences:   1     Standing Expiration Date:   11/4/2025     Order Specific Question:   Release to patient     Answer:   Immediate [1]

## 2024-11-06 LAB
25(OH)D3+25(OH)D2 SERPL-MCNC: 72 NG/ML (ref 30–100)
ALBUMIN SERPL-MCNC: 4.7 G/DL (ref 3.6–5.1)
ALBUMIN/GLOB SERPL: 1.8 (CALC) (ref 1–2.5)
ALP SERPL-CCNC: 48 U/L (ref 37–153)
ALT SERPL-CCNC: 20 U/L (ref 6–29)
AST SERPL-CCNC: 27 U/L (ref 10–35)
BASOPHILS # BLD AUTO: 29 CELLS/UL (ref 0–200)
BASOPHILS NFR BLD AUTO: 0.7 %
BILIRUB SERPL-MCNC: 0.6 MG/DL (ref 0.2–1.2)
BUN SERPL-MCNC: 10 MG/DL (ref 7–25)
BUN/CREAT SERPL: 48 (CALC) (ref 6–22)
CALCIUM SERPL-MCNC: 9.9 MG/DL (ref 8.6–10.4)
CHLORIDE SERPL-SCNC: 99 MMOL/L (ref 98–110)
CHOLEST SERPL-MCNC: 202 MG/DL
CHOLEST/HDLC SERPL: 2.2 (CALC)
CO2 SERPL-SCNC: 33 MMOL/L (ref 20–32)
CREAT SERPL-MCNC: 0.21 MG/DL (ref 0.5–1.03)
EGFRCR SERPLBLD CKD-EPI 2021: 141 ML/MIN/1.73M2
EOSINOPHIL # BLD AUTO: 42 CELLS/UL (ref 15–500)
EOSINOPHIL NFR BLD AUTO: 1 %
ERYTHROCYTE [DISTWIDTH] IN BLOOD BY AUTOMATED COUNT: 12.6 % (ref 11–15)
GLOBULIN SER CALC-MCNC: 2.6 G/DL (CALC) (ref 1.9–3.7)
GLUCOSE SERPL-MCNC: 83 MG/DL (ref 65–99)
HCT VFR BLD AUTO: 40.8 % (ref 35–45)
HDLC SERPL-MCNC: 91 MG/DL
HGB BLD-MCNC: 13 G/DL (ref 11.7–15.5)
LDLC SERPL CALC-MCNC: 96 MG/DL (CALC)
LYMPHOCYTES # BLD AUTO: 895 CELLS/UL (ref 850–3900)
LYMPHOCYTES NFR BLD AUTO: 21.3 %
MCH RBC QN AUTO: 31.1 PG (ref 27–33)
MCHC RBC AUTO-ENTMCNC: 31.9 G/DL (ref 32–36)
MCV RBC AUTO: 97.6 FL (ref 80–100)
MONOCYTES # BLD AUTO: 344 CELLS/UL (ref 200–950)
MONOCYTES NFR BLD AUTO: 8.2 %
NEUTROPHILS # BLD AUTO: 2890 CELLS/UL (ref 1500–7800)
NEUTROPHILS NFR BLD AUTO: 68.8 %
NONHDLC SERPL-MCNC: 111 MG/DL (CALC)
PLATELET # BLD AUTO: 173 THOUSAND/UL (ref 140–400)
PMV BLD REES-ECKER: 10.3 FL (ref 7.5–12.5)
POTASSIUM SERPL-SCNC: 4.6 MMOL/L (ref 3.5–5.3)
PROT SERPL-MCNC: 7.3 G/DL (ref 6.1–8.1)
RBC # BLD AUTO: 4.18 MILLION/UL (ref 3.8–5.1)
SODIUM SERPL-SCNC: 140 MMOL/L (ref 135–146)
TRIGL SERPL-MCNC: 66 MG/DL
TSH SERPL-ACNC: 1.31 MIU/L
WBC # BLD AUTO: 4.2 THOUSAND/UL (ref 3.8–10.8)

## 2024-11-19 RX ORDER — METHYLPREDNISOLONE 4 MG/1
TABLET ORAL
Qty: 21 TABLET | Refills: 0 | Status: SHIPPED | OUTPATIENT
Start: 2024-11-19 | End: 2024-11-26

## 2024-12-02 RX ORDER — VENLAFAXINE HYDROCHLORIDE 37.5 MG/1
CAPSULE, EXTENDED RELEASE ORAL
Qty: 30 CAPSULE | Refills: 0 | Status: SHIPPED | OUTPATIENT
Start: 2024-12-02 | End: 2025-01-06

## 2024-12-10 ENCOUNTER — TELEPHONE (OUTPATIENT)
Dept: PRIMARY CARE | Facility: CLINIC | Age: 49
End: 2024-12-10
Payer: COMMERCIAL

## 2024-12-10 NOTE — TELEPHONE ENCOUNTER
Rep from Provider Handy Mane--'s office LVM requesting Last few OV notes to be sent when possible. Provider received a denial and will need to complete a Peer-2-Peer. Would like the best idea of pt's medical history.     Fax [phone]

## 2024-12-17 RX ORDER — OXYBUTYNIN CHLORIDE 5 MG/1
TABLET ORAL
Qty: 270 TABLET | Refills: 3 | Status: SHIPPED | OUTPATIENT
Start: 2024-12-17

## 2024-12-30 RX ORDER — NAPROXEN 500 MG/1
TABLET ORAL
Qty: 60 TABLET | Refills: 1 | Status: SHIPPED | OUTPATIENT
Start: 2024-12-30 | End: 2025-03-07

## 2025-01-02 RX ORDER — FLUCONAZOLE 150 MG/1
150 TABLET ORAL ONCE
Qty: 3 TABLET | Refills: 0 | Status: SHIPPED | OUTPATIENT
Start: 2025-01-02 | End: 2025-01-02

## 2025-01-05 DIAGNOSIS — Z23 FLU VACCINE NEED: ICD-10-CM

## 2025-01-05 DIAGNOSIS — F41.9 ANXIETY: Primary | ICD-10-CM

## 2025-01-06 ENCOUNTER — TELEPHONE (OUTPATIENT)
Dept: RHEUMATOLOGY | Facility: CLINIC | Age: 50
End: 2025-01-06
Payer: COMMERCIAL

## 2025-01-06 NOTE — TELEPHONE ENCOUNTER
Lorazepam 0.5 mg  Filled on 10/3/2024  # 90/90  PDMP checked with 1 red flags  Medicine Refill Request    Last Office Visit: 11/4/2024   Last Consult Visit: Visit date not found  Last Telemedicine Visit: 5/26/2022 Jaycob Peña DO    Next Appointment: Visit date not found

## 2025-01-08 DIAGNOSIS — F41.9 ANXIETY: ICD-10-CM

## 2025-01-08 RX ORDER — LORAZEPAM 0.5 MG/1
0.5 TABLET ORAL DAILY
Qty: 90 TABLET | Refills: 0 | Status: SHIPPED | OUTPATIENT
Start: 2025-01-08 | End: 2025-01-08 | Stop reason: SDUPTHER

## 2025-01-08 RX ORDER — CITALOPRAM 10 MG/1
10 TABLET ORAL DAILY
Qty: 90 TABLET | Refills: 1 | Status: SHIPPED | OUTPATIENT
Start: 2025-01-08

## 2025-01-08 RX ORDER — LORAZEPAM 0.5 MG/1
0.5 TABLET ORAL DAILY
Qty: 90 TABLET | Refills: 0 | OUTPATIENT
Start: 2025-01-08

## 2025-01-08 NOTE — TELEPHONE ENCOUNTER
"Medication   Filled on  #  PDMP checked with no red flags     Medicine Refill Request    Last Office Visit: 11/4/2024   Last Consult Visit: Visit date not found  Last Telemedicine Visit: 5/26/2022 Jaycob Peña DO    Next Appointment: Visit date not found      Current Outpatient Medications:     betamethasone valerate (VALISONE) 0.1 % cream, Apply topically 2 (two) times a day., Disp: 45 g, Rfl: 1    cephalexin (KEFLEX) 500 mg capsule, Take 500 mg by mouth 3 (three) times a day. (Patient not taking: Reported on 11/4/2024), Disp: , Rfl:     cholecalciferol, vitamin D3, 5,000 unit (125 mcg) capsule, Take 5,000 Units by mouth daily., Disp: , Rfl:     citalopram (celeXA) 10 mg tablet, Take 1 tablet (10 mg total) by mouth daily., Disp: 90 tablet, Rfl: 1    LORazepam (ATIVAN) 0.5 mg tablet, Take 1 tablet (0.5 mg total) by mouth daily., Disp: 90 tablet, Rfl: 0    multivit with minerals/lutein (MULTIVITAMIN 50 PLUS ORAL), multivitamin, Disp: , Rfl:     naproxen (NAPROSYN) 500 mg tablet, TAKE 1 TABLET(500 MG) BY MOUTH TWICE DAILY WITH MEALS, Disp: 60 tablet, Rfl: 1    nystatin (MYCOSTATIN) 100,000 unit/gram cream, APPLY TOPICALLY TO THE AFFECTED AREA TWICE DAILY, Disp: , Rfl:     oxybutynin (DITROPAN) 5 mg tablet, TAKE 1 TABLET THREE TIMES A DAY, Disp: 270 tablet, Rfl: 3    traMADoL (ULTRAM) 50 mg tablet, Take 1 tablet (50 mg total) by mouth every 6 (six) hours as needed for moderate pain., Disp: 120 tablet, Rfl: 0      BP Readings from Last 3 Encounters:   11/04/24 132/88   04/03/24 120/88   08/17/23 105/68       Recent Lab results:  Lab Results   Component Value Date    CHOL 202 (H) 11/05/2024   ,   Lab Results   Component Value Date    HDL 91 11/05/2024   ,   Lab Results   Component Value Date    LDLCALC 96 11/05/2024   ,   Lab Results   Component Value Date    TRIG 66 11/05/2024        Lab Results   Component Value Date    GLUCOSE 83 11/05/2024   , No results found for: \"HGBA1C\"      Lab Results   Component Value " "Date    CREATININE 0.21 (L) 11/05/2024       Lab Results   Component Value Date    TSH 1.31 11/05/2024         No results found for: \"HGBA1C\"   "

## 2025-01-08 NOTE — TELEPHONE ENCOUNTER
Rx coming from [facility] but won't arrive before pt is out of meds. Asking for Medication to cover gap.   LORazepam (ATIVAN) 0.5 mg tablet   Filled on 10.3.24  # 90  PDMP checked with no red flags      Medicine Refill Request     Last Office Visit: 11/4/2024   Last Consult Visit: Visit date not found  Last Telemedicine Visit: 5/26/2022 Jaycob Peña,      Next Appointment: Visit date not found       Current Medications      Current Outpatient Medications:     betamethasone valerate (VALISONE) 0.1 % cream, Apply topically 2 (two) times a day., Disp: 45 g, Rfl: 1    cephalexin (KEFLEX) 500 mg capsule, Take 500 mg by mouth 3 (three) times a day. (Patient not taking: Reported on 11/4/2024), Disp: , Rfl:     cholecalciferol, vitamin D3, 5,000 unit (125 mcg) capsule, Take 5,000 Units by mouth daily., Disp: , Rfl:     citalopram (celeXA) 10 mg tablet, Take 1 tablet (10 mg total) by mouth daily., Disp: 90 tablet, Rfl: 1    LORazepam (ATIVAN) 0.5 mg tablet, Take 1 tablet (0.5 mg total) by mouth daily., Disp: 90 tablet, Rfl: 0    multivit with minerals/lutein (MULTIVITAMIN 50 PLUS ORAL), multivitamin, Disp: , Rfl:     naproxen (NAPROSYN) 500 mg tablet, TAKE 1 TABLET(500 MG) BY MOUTH TWICE DAILY WITH MEALS, Disp: 60 tablet, Rfl: 1    nystatin (MYCOSTATIN) 100,000 unit/gram cream, APPLY TOPICALLY TO THE AFFECTED AREA TWICE DAILY, Disp: , Rfl:     oxybutynin (DITROPAN) 5 mg tablet, TAKE 1 TABLET THREE TIMES A DAY, Disp: 270 tablet, Rfl: 3    traMADoL (ULTRAM) 50 mg tablet, Take 1 tablet (50 mg total) by mouth every 6 (six) hours as needed for moderate pain., Disp: 120 tablet, Rfl: 0               BP Readings from Last 3 Encounters:   11/04/24 132/88   04/03/24 120/88   08/17/23 105/68         Recent Lab results:        Lab Results   Component Value Date     CHOL 202 (H) 11/05/2024   ,         Lab Results   Component Value Date     HDL 91 11/05/2024   ,         Lab Results   Component Value Date     LDLCALC 96  "11/05/2024   ,         Lab Results   Component Value Date     TRIG 66 11/05/2024               Lab Results   Component Value Date     GLUCOSE 83 11/05/2024   , No results found for: \"HGBA1C\"              Lab Results   Component Value Date     CREATININE 0.21 (L) 11/05/2024               Lab Results   Component Value Date     TSH 1.31 11/05/2024          No results found for: \"HGBA1C\"   "

## 2025-01-09 DIAGNOSIS — M18.9 OSTEOARTHRITIS OF CARPOMETACARPAL (CMC) JOINT OF THUMB, UNSPECIFIED LATERALITY, UNSPECIFIED OSTEOARTHRITIS TYPE: Primary | ICD-10-CM

## 2025-01-09 RX ORDER — TRAMADOL HYDROCHLORIDE 50 MG/1
50 TABLET ORAL EVERY 6 HOURS PRN
Qty: 120 TABLET | Refills: 0 | Status: SHIPPED | OUTPATIENT
Start: 2025-01-09 | End: 2025-03-03 | Stop reason: SDUPTHER

## 2025-01-09 RX ORDER — LORAZEPAM 0.5 MG/1
0.5 TABLET ORAL DAILY
Qty: 5 TABLET | Refills: 0 | Status: SHIPPED | OUTPATIENT
Start: 2025-01-09 | End: 2025-01-20 | Stop reason: SDUPTHER

## 2025-01-09 NOTE — TELEPHONE ENCOUNTER
Tramadol 50 mg  Filled on 9/30/2024  # 120/30  PDMP checked with 1 red flag  Medicine Refill Request    Last Office Visit: 11/4/2024   Last Consult Visit: Visit date not found  Last Telemedicine Visit: 5/26/2022 Jaycob Peña DO    Next Appointment: Visit date not found

## 2025-01-20 DIAGNOSIS — F41.9 ANXIETY: ICD-10-CM

## 2025-01-20 RX ORDER — LORAZEPAM 0.5 MG/1
0.5 TABLET ORAL DAILY
Qty: 5 TABLET | Refills: 0 | Status: SHIPPED | OUTPATIENT
Start: 2025-01-20

## 2025-02-10 ENCOUNTER — TELEMEDICINE (OUTPATIENT)
Dept: PRIMARY CARE | Facility: CLINIC | Age: 50
End: 2025-02-10
Payer: COMMERCIAL

## 2025-02-10 DIAGNOSIS — K50.919 CROHN'S DISEASE WITH COMPLICATION, UNSPECIFIED GASTROINTESTINAL TRACT LOCATION (CMS/HCC): ICD-10-CM

## 2025-02-10 DIAGNOSIS — J06.9 ACUTE URI: Primary | ICD-10-CM

## 2025-02-10 DIAGNOSIS — G71.02 FSHD (FACIOSCAPULOHUMERAL MUSCULAR DYSTROPHY) (CMS/HCC): ICD-10-CM

## 2025-02-10 DIAGNOSIS — G82.52 QUADRIPLEGIA, C1-C4 INCOMPLETE (CMS/HCC): ICD-10-CM

## 2025-02-10 PROCEDURE — 99213 OFFICE O/P EST LOW 20 MIN: CPT | Mod: 95 | Performed by: INTERNAL MEDICINE

## 2025-02-10 RX ORDER — AZITHROMYCIN 250 MG/1
TABLET, FILM COATED ORAL
Qty: 6 TABLET | Refills: 0 | Status: SHIPPED | OUTPATIENT
Start: 2025-02-10 | End: 2025-02-15

## 2025-02-10 ASSESSMENT — ENCOUNTER SYMPTOMS
SHORTNESS OF BREATH: 0
COUGH: 0
FATIGUE: 0
HEADACHES: 0
PALPITATIONS: 0
WEAKNESS: 0
DIZZINESS: 0
ACTIVITY CHANGE: 0
EYE PAIN: 0
FEVER: 0
ENDOCRINE NEGATIVE: 1
CHEST TIGHTNESS: 0
APPETITE CHANGE: 0

## 2025-02-10 NOTE — PROGRESS NOTES
Verification of Patient Location:  The patient affirms they are currently located in the following state: [place]    Are you in your home or a private residence? Yes    Request for Consent:    Audio and Video Encounter   Hello, my name is Jaycob VILLANUEVA DO Mariana.  Before we proceed, can you please verify your identification by telling me your full name and date of birth?  Can you tell me who is in the room with you?    You and I are about to have a telemedicine check-in or visit because you have requested it.  This is a live video-conference.  I am a real person, speaking to you in real time.  There is no one else with me on the video-conference. I am not recording this conversation and I am asking you not to record it.  This telemedicine visit will be billed to your health insurance or you, if you are self-insured.  You understand you will be responsible for any copayments or coinsurances that apply to your telemedicine visit.  Communication platform used for this encounter:  [employer] Video Visit (Epic Video Client)       Before starting our telemedicine visit, I am required to get your consent for this virtual check-in or visit by telemedicine. Do you consent?    Patient Response to Request for Consent:  Yes      Visit Documentation:  Subjective     Patient ID: Sofia Valente is a 50 y.o. female.  1975      Onset of sx-last Sunday  Myalgias, HA, chills, Fever  Tamiflu started-3 days   Prolonged cough episodes  Fatigure increased over the last        The following have been reviewed and updated as appropriate in this visit:   Allergies  Meds  Problems       Review of Systems   Constitutional:  Negative for activity change, appetite change, fatigue and fever.   HENT: Negative.     Eyes:  Negative for pain and visual disturbance.   Respiratory:  Negative for cough, chest tightness and shortness of breath.    Cardiovascular:  Negative for chest pain and palpitations.   Endocrine: Negative.     Neurological:  Negative for dizziness, weakness and headaches.       Assessment & Plan  Acute URI  OTC meds for symptom management reviewed. Increase fluids and rest. Notify me with failure to improve or worsening of symptoms. abx to be started.  azithambrose         Crohn's disease with complication, unspecified gastrointestinal tract location (CMS/HCC)  Chronic stable. No change to regimen           FSHD (facioscapulohumeral muscular dystrophy) (CMS/[facility])  Chronic stable. No change to regimen           Quadriplegia, C1-C4 incomplete (CMS/[facility])  Chronic stable. No change to regimen             Time Spent:  I spent 23 minutes on this date of service performing the following activities: obtaining history, entering orders, documenting, and providing counseling and education.

## 2025-02-12 PROBLEM — J06.9 ACUTE URI: Status: ACTIVE | Noted: 2025-02-12

## 2025-02-12 NOTE — ASSESSMENT & PLAN NOTE
OTC meds for symptom management reviewed. Increase fluids and rest. Notify me with failure to improve or worsening of symptoms. abx to be started.  darrell

## 2025-03-03 DIAGNOSIS — M18.9 OSTEOARTHRITIS OF CARPOMETACARPAL (CMC) JOINT OF THUMB, UNSPECIFIED LATERALITY, UNSPECIFIED OSTEOARTHRITIS TYPE: ICD-10-CM

## 2025-03-03 RX ORDER — NYSTATIN 100000 U/G
CREAM TOPICAL
Qty: 30 G | OUTPATIENT
Start: 2025-03-03

## 2025-03-03 RX ORDER — TRAMADOL HYDROCHLORIDE 50 MG/1
50 TABLET ORAL EVERY 6 HOURS PRN
Qty: 120 TABLET | Refills: 0 | Status: SHIPPED | OUTPATIENT
Start: 2025-03-03

## 2025-03-03 NOTE — TELEPHONE ENCOUNTER
Medication traMADoL (ULTRAM) 50 mg tablet   Filled on 1.10.25  # 120  PDMP checked with no red flags     Medicine Refill Request    Last Office Visit: 11/4/2024   Last Consult Visit: Visit date not found  Last Telemedicine Visit: 2/10/2025 Jaycob Peña,     Next Appointment: Visit date not found      Current Outpatient Medications:     betamethasone valerate (VALISONE) 0.1 % cream, Apply topically 2 (two) times a day., Disp: 45 g, Rfl: 1    cephalexin (KEFLEX) 500 mg capsule, Take 500 mg by mouth 3 (three) times a day. (Patient not taking: Reported on 11/4/2024), Disp: , Rfl:     cholecalciferol, vitamin D3, 5,000 unit (125 mcg) capsule, Take 5,000 Units by mouth daily., Disp: , Rfl:     citalopram (celeXA) 10 mg tablet, Take 1 tablet (10 mg total) by mouth daily., Disp: 90 tablet, Rfl: 1    LORazepam (ATIVAN) 0.5 mg tablet, Take 1 tablet (0.5 mg total) by mouth daily., Disp: 5 tablet, Rfl: 0    multivit with minerals/lutein (MULTIVITAMIN 50 PLUS ORAL), multivitamin, Disp: , Rfl:     naproxen (NAPROSYN) 500 mg tablet, TAKE 1 TABLET(500 MG) BY MOUTH TWICE DAILY WITH MEALS, Disp: 60 tablet, Rfl: 1    nystatin (MYCOSTATIN) 100,000 unit/gram cream, APPLY TOPICALLY TO THE AFFECTED AREA TWICE DAILY, Disp: , Rfl:     oxybutynin (DITROPAN) 5 mg tablet, TAKE 1 TABLET THREE TIMES A DAY, Disp: 270 tablet, Rfl: 3    traMADoL (ULTRAM) 50 mg tablet, Take 1 tablet (50 mg total) by mouth every 6 (six) hours as needed for moderate pain., Disp: 120 tablet, Rfl: 0    BP Readings from Last 3 Encounters:   11/04/24 132/88   04/03/24 120/88   08/17/23 105/68       Recent Lab results:  Lab Results   Component Value Date    CHOL 202 (H) 11/05/2024   ,   Lab Results   Component Value Date    HDL 91 11/05/2024   ,   Lab Results   Component Value Date    LDLCALC 96 11/05/2024   ,   Lab Results   Component Value Date    TRIG 66 11/05/2024        Lab Results   Component Value Date    GLUCOSE 83 11/05/2024   , No results found for:  "\"HGBA1C\"      Lab Results   Component Value Date    CREATININE 0.21 (L) 11/05/2024       Lab Results   Component Value Date    TSH 1.31 11/05/2024         No results found for: \"HGBA1C\"   "

## 2025-03-07 RX ORDER — NAPROXEN 500 MG/1
TABLET ORAL
Qty: 60 TABLET | Refills: 1 | Status: SHIPPED | OUTPATIENT
Start: 2025-03-07 | End: 2025-04-06 | Stop reason: SDUPTHER

## 2025-03-11 RX ORDER — HYDROCORTISONE 25 MG/G
CREAM TOPICAL 2 TIMES DAILY
Qty: 30 G | Refills: 1 | Status: SHIPPED | OUTPATIENT
Start: 2025-03-11 | End: 2025-04-10

## 2025-03-11 RX ORDER — NYSTATIN 100000 U/G
CREAM TOPICAL AS NEEDED
Qty: 30 G | Refills: 3 | Status: SHIPPED | OUTPATIENT
Start: 2025-03-11

## 2025-03-21 ENCOUNTER — TELEPHONE (OUTPATIENT)
Dept: PRIMARY CARE | Facility: CLINIC | Age: 50
End: 2025-03-21
Payer: COMMERCIAL

## 2025-03-21 NOTE — TELEPHONE ENCOUNTER
Received call from Lori from [facility] following up on a prescription that was sent over to get a wheelchair repaired.    Universal Safety Interventions

## 2025-03-24 NOTE — TELEPHONE ENCOUNTER
Returned Lori's call from [employer] [facility] to see if we received Rx for wheelchair repair. Asked for callback if it has not been received by our office.

## 2025-03-27 NOTE — ASSESSMENT & PLAN NOTE
Pasquale was seen in the emergency department for fevers, vomiting, and cough.  His COVID and flu were negative.  We treated him with Motrin and nausea medicine and he was able to drink apple juice.  His exam is reassuring.  We believe he is safe to discharge home.  We recommend following up with your pediatrician in 3 to 5 days if his symptoms are not improving.  Please return to the emergency department if he is not eating or drinking, if his fevers are not improving with Tylenol or Motrin, if he develops abdominal pain, or if symptoms are not improving.     Pasquale fue atendido en urgencias por fiebre, vómitos y tos. Amanda pruebas de COVID-19 y gripe dieron negativo. Lo tratamos con Motrin y antieméticos, y pudo beber jugo de manzana. Díaz examen es tranquilizador. Creemos que puede ser dado de marsha sin problemas. Recomendamos consultar con díaz pediatra en 3 a 5 días si amanda síntomas no mejoran. Por favor, regrese a urgencias si no come ni kristen, si la fiebre no mejora con Tylenol o Motrin, si presenta dolor abdominal o si los síntomas no mejoran.   Try to elevate during the day at this time-   Can have u/s done and vascualr eval if needed.

## 2025-04-06 DIAGNOSIS — F41.9 ANXIETY: ICD-10-CM

## 2025-04-07 RX ORDER — LORAZEPAM 0.5 MG/1
0.5 TABLET ORAL DAILY
Qty: 90 TABLET | Refills: 0 | Status: SHIPPED | OUTPATIENT
Start: 2025-04-07

## 2025-04-07 RX ORDER — NAPROXEN 500 MG/1
TABLET ORAL
Qty: 60 TABLET | Refills: 1 | Status: SHIPPED | OUTPATIENT
Start: 2025-04-07

## 2025-04-07 NOTE — TELEPHONE ENCOUNTER
Medication LORazepam (ATIVAN) 0.5 mg tablet   Filled on 1.14.25  # 90  PDMP checked with no red flags     Medicine Refill Request    Last Office Visit: 11/4/2024   Last Consult Visit: Visit date not found  Last Telemedicine Visit: 2/10/2025 Jaycob Peña DO    Next Appointment: Visit date not found      Current Outpatient Medications:     naproxen (NAPROSYN) 500 mg tablet, TAKE 1 TABLET(500 MG) BY MOUTH TWICE DAILY WITH MEALS, Disp: 60 tablet, Rfl: 1    betamethasone valerate (VALISONE) 0.1 % cream, Apply topically 2 (two) times a day., Disp: 45 g, Rfl: 1    cephalexin (KEFLEX) 500 mg capsule, Take 500 mg by mouth 3 (three) times a day. (Patient not taking: Reported on 11/4/2024), Disp: , Rfl:     cholecalciferol, vitamin D3, 5,000 unit (125 mcg) capsule, Take 5,000 Units by mouth daily., Disp: , Rfl:     citalopram (celeXA) 10 mg tablet, Take 1 tablet (10 mg total) by mouth daily., Disp: 90 tablet, Rfl: 1    hydrocortisone (ANUSOL-HC) 2.5 % rectal cream, Insert into the rectum 2 (two) times a day., Disp: 30 g, Rfl: 1    LORazepam (ATIVAN) 0.5 mg tablet, Take 1 tablet (0.5 mg total) by mouth daily., Disp: 5 tablet, Rfl: 0    multivit with minerals/lutein (MULTIVITAMIN 50 PLUS ORAL), multivitamin, Disp: , Rfl:     nystatin (MYCOSTATIN) 100,000 unit/gram cream, Apply topically as needed for itching or rash., Disp: 30 g, Rfl: 3    oxybutynin (DITROPAN) 5 mg tablet, TAKE 1 TABLET THREE TIMES A DAY, Disp: 270 tablet, Rfl: 3    traMADoL (ULTRAM) 50 mg tablet, Take 1 tablet (50 mg total) by mouth every 6 (six) hours as needed for moderate pain., Disp: 120 tablet, Rfl: 0    BP Readings from Last 3 Encounters:   11/04/24 132/88   04/03/24 120/88   08/17/23 105/68       Recent Lab results:  Lab Results   Component Value Date    CHOL 202 (H) 11/05/2024   ,   Lab Results   Component Value Date    HDL 91 11/05/2024   ,   Lab Results   Component Value Date    LDLCALC 96 11/05/2024   ,   Lab Results   Component Value Date  "   TRIG 66 11/05/2024        Lab Results   Component Value Date    GLUCOSE 83 11/05/2024   , No results found for: \"HGBA1C\"      Lab Results   Component Value Date    CREATININE 0.21 (L) 11/05/2024       Lab Results   Component Value Date    TSH 1.31 11/05/2024         No results found for: \"HGBA1C\"   "

## 2025-05-05 ENCOUNTER — TELEMEDICINE (OUTPATIENT)
Dept: PRIMARY CARE | Facility: CLINIC | Age: 50
End: 2025-05-05
Payer: COMMERCIAL

## 2025-05-05 DIAGNOSIS — R42 VERTIGO: ICD-10-CM

## 2025-05-05 DIAGNOSIS — M25.512 CHRONIC LEFT SHOULDER PAIN: Primary | ICD-10-CM

## 2025-05-05 DIAGNOSIS — G89.29 CHRONIC LEFT SHOULDER PAIN: Primary | ICD-10-CM

## 2025-05-05 PROCEDURE — 99214 OFFICE O/P EST MOD 30 MIN: CPT | Mod: 95 | Performed by: INTERNAL MEDICINE

## 2025-05-05 RX ORDER — MECLIZINE HCL 25MG 25 MG/1
25 TABLET, CHEWABLE ORAL 3 TIMES DAILY PRN
Qty: 30 TABLET | Refills: 0 | Status: SHIPPED | OUTPATIENT
Start: 2025-05-05 | End: 2025-05-15

## 2025-05-05 RX ORDER — METHYLPREDNISOLONE 4 MG/1
TABLET ORAL
Qty: 21 TABLET | Refills: 0 | Status: SHIPPED | OUTPATIENT
Start: 2025-05-05 | End: 2025-05-12

## 2025-05-05 RX ORDER — FLUTICASONE PROPIONATE 50 MCG
1 SPRAY, SUSPENSION (ML) NASAL DAILY
Qty: 16 G | Refills: 5 | Status: SHIPPED | OUTPATIENT
Start: 2025-05-05

## 2025-05-16 ENCOUNTER — TELEPHONE (OUTPATIENT)
Dept: PRIMARY CARE | Facility: CLINIC | Age: 50
End: 2025-05-16
Payer: COMMERCIAL

## 2025-06-12 ENCOUNTER — OFFICE VISIT (OUTPATIENT)
Dept: PRIMARY CARE | Facility: CLINIC | Age: 50
End: 2025-06-12
Payer: COMMERCIAL

## 2025-06-12 VITALS
TEMPERATURE: 98.3 F | DIASTOLIC BLOOD PRESSURE: 88 MMHG | SYSTOLIC BLOOD PRESSURE: 122 MMHG | OXYGEN SATURATION: 98 % | HEART RATE: 99 BPM | RESPIRATION RATE: 18 BRPM

## 2025-06-12 DIAGNOSIS — G71.02 FSHD (FACIOSCAPULOHUMERAL MUSCULAR DYSTROPHY) (CMS/HCC): ICD-10-CM

## 2025-06-12 DIAGNOSIS — N39.0 URINARY TRACT INFECTION WITHOUT HEMATURIA, SITE UNSPECIFIED: Primary | ICD-10-CM

## 2025-06-12 DIAGNOSIS — K50.919 CROHN'S DISEASE WITH COMPLICATION, UNSPECIFIED GASTROINTESTINAL TRACT LOCATION (CMS/HCC): ICD-10-CM

## 2025-06-12 DIAGNOSIS — L98.9 SKIN LESIONS: ICD-10-CM

## 2025-06-12 PROCEDURE — 99214 OFFICE O/P EST MOD 30 MIN: CPT | Performed by: INTERNAL MEDICINE

## 2025-06-12 RX ORDER — SULFAMETHOXAZOLE AND TRIMETHOPRIM 800; 160 MG/1; MG/1
1 TABLET ORAL 2 TIMES DAILY
Qty: 10 TABLET | Refills: 0 | Status: SHIPPED | OUTPATIENT
Start: 2025-06-12 | End: 2025-06-17

## 2025-06-12 RX ORDER — CEPHALEXIN 500 MG/1
500 TABLET, FILM COATED ORAL EVERY 8 HOURS
COMMUNITY
End: 2025-06-27

## 2025-06-12 RX ORDER — VENLAFAXINE HYDROCHLORIDE 37.5 MG/1
37.5 CAPSULE, EXTENDED RELEASE ORAL DAILY
COMMUNITY
End: 2025-06-27

## 2025-06-12 RX ORDER — CELECOXIB 200 MG/1
200 CAPSULE ORAL
COMMUNITY

## 2025-06-12 RX ORDER — VIBEGRON 75 MG/1
75 TABLET, FILM COATED ORAL DAILY
COMMUNITY
Start: 2025-03-03 | End: 2025-06-27

## 2025-06-12 RX ORDER — TROSPIUM CHLORIDE 20 MG/1
20 TABLET, FILM COATED ORAL 2 TIMES DAILY
COMMUNITY
Start: 2025-06-05

## 2025-06-12 RX ORDER — CLOBETASOL PROPIONATE 0.5 MG/G
CREAM TOPICAL
COMMUNITY

## 2025-06-12 RX ORDER — AZITHROMYCIN 250 MG/1
250 TABLET, FILM COATED ORAL DAILY
COMMUNITY

## 2025-06-12 ASSESSMENT — ENCOUNTER SYMPTOMS
FATIGUE: 0
ENDOCRINE NEGATIVE: 1
PALPITATIONS: 0
WEAKNESS: 0
COUGH: 0
FEVER: 0
CHEST TIGHTNESS: 0
DIZZINESS: 0
SHORTNESS OF BREATH: 0
APPETITE CHANGE: 0
EYE PAIN: 0
ACTIVITY CHANGE: 0
HEADACHES: 0

## 2025-06-12 NOTE — PROGRESS NOTES
Subjective     Patient ID: Sofia Valente is a 50 y.o. female.    Night sweats,     Lesion R morrison     Fatigue    Shoulder pain-    Uti-              Review of Systems   Constitutional:  Negative for activity change, appetite change, fatigue and fever.   HENT: Negative.     Eyes:  Negative for pain and visual disturbance.   Respiratory:  Negative for cough, chest tightness and shortness of breath.    Cardiovascular:  Negative for chest pain and palpitations.   Endocrine: Negative.    Neurological:  Negative for dizziness, weakness and headaches.       Current Outpatient Medications   Medication Sig Dispense Refill    azithromycin (ZITHROMAX) 250 mg tablet Take 250 mg by mouth daily.      celecoxib (celeBREX) 200 mg capsule Take 200 mg by mouth.      cholecalciferol, vitamin D3, 5,000 unit (125 mcg) capsule Take 5,000 Units by mouth daily.      citalopram (celeXA) 10 mg tablet Take 1 tablet (10 mg total) by mouth daily. 90 tablet 1    clobetasoL (TEMOVATE) 0.05 % cream Apply topically.      fluticasone propionate (FLONASE) 50 mcg/actuation nasal spray Administer 1 spray into each nostril daily. 16 g 5    LORazepam (ATIVAN) 0.5 mg tablet Take 1 tablet (0.5 mg total) by mouth daily. 90 tablet 0    multivit with minerals/lutein (MULTIVITAMIN 50 PLUS ORAL) multivitamin      naproxen (NAPROSYN) 500 mg tablet TAKE 1 TABLET(500 MG) BY MOUTH TWICE DAILY WITH MEALS 60 tablet 1    nystatin (MYCOSTATIN) 100,000 unit/gram cream Apply topically as needed for itching or rash. 30 g 3    predniSONE (DELTASONE) 10 mg tablet 6 tabs day 1, 5 tabs day 2, 4 tabs day 3, 3 tabs day 4, 2 tabs day 5, 1 tab day 6 21 tablet 0    traMADoL (ULTRAM) 50 mg tablet Take 1 tablet (50 mg total) by mouth every 6 (six) hours as needed for moderate pain. 120 tablet 0    trospium (SANCTURA) 20 mg tablet Take 20 mg by mouth 2 (two) times a day.      betamethasone valerate (VALISONE) 0.1 % cream Apply topically 2 (two) times a day. 45 g 1     hydrocortisone (ANUSOL-HC) 2.5 % rectal cream Insert into the rectum 2 (two) times a day. 30 g 1    meclizine (ANTIVERT) 25 mg tablet Take 1 tablet (25 mg total) by mouth 3 (three) times a day as needed for dizziness for up to 10 days. 30 tablet 0    oxybutynin (DITROPAN) 5 mg tablet TAKE 1 TABLET THREE TIMES A DAY (Patient not taking: Reported on 6/12/2025) 270 tablet 3     No current facility-administered medications for this visit.     Past Medical History:   Diagnosis Date    CD (Crohn's disease) (CMS/[facility])     Celiac disease     FSHD (facioscapulohumeral muscular dystrophy) (CMS/[facility])     Tibial fracture     right tibia transverse fracture    Urinary incontinence      Family History   Problem Relation Name Age of Onset    Celiac disease Biological Mother      Muscular dystrophy Biological Mother      Stroke Biological Mother      No Known Problems Biological Father      Muscular dystrophy Biological Sister      Colon cancer Paternal Grandmother      Lymphoma Paternal Grandfather      Muscular dystrophy Maternal Grandmother       Past Surgical History   Procedure Laterality Date    Appendectomy      Bowel resection      Other surgical history Right     Scapulothoracic fusion      Social History     Socioeconomic History    Marital status:      Spouse name: Not on file    Number of children: Not on file    Years of education: Not on file    Highest education level: Not on file   Occupational History    Not on file   Tobacco Use    Smoking status: Never    Smokeless tobacco: Never   Substance and Sexual Activity    Alcohol use: Never    Drug use: Never    Sexual activity: Yes     Partners: Male   Other Topics Concern    Not on file   Social History Narrative    Not on file     Social Drivers of Health     Financial Resource Strain: Not on file   Food Insecurity: No Food Insecurity (10/31/2024)    Hunger Vital Sign     Worried About Running Out of Food in the Last Year: Never true     Ran Out of Food in the  Last Year: Never true   Transportation Needs: No Transportation Needs (10/31/2024)    PRAPARE - Transportation     Lack of Transportation (Medical): No     Lack of Transportation (Non-Medical): No   Physical Activity: Not on file   Stress: Not on file   Social Connections: Not on file   Intimate Partner Violence: Not on file   Housing Stability: Low Risk  (10/31/2024)    Housing Stability Vital Sign     Unable to Pay for Housing in the Last Year: No     Number of Times Moved in the Last Year: 0     Homeless in the Last Year: No     Allergies   Allergen Reactions    Gluten     Gluten Protein GI intolerance    Morphine     Yqmxe-Iylop-Ckefeqk-Pramoxine      Other reaction(s): Unknown    Penicillins GI intolerance       Objective     Vitals:    06/12/25 1342   BP: 122/88   Pulse: 99   Resp: 18   Temp: 36.8 °C (98.3 °F)   SpO2: 98%     There is no height or weight on file to calculate BMI.    Physical Exam  Vitals and nursing note reviewed.   Constitutional:       Appearance: She is well-developed.   HENT:      Head: Normocephalic and atraumatic.   Eyes:      Pupils: Pupils are equal, round, and reactive to light.   Cardiovascular:      Rate and Rhythm: Normal rate and regular rhythm.   Pulmonary:      Effort: Pulmonary effort is normal.      Breath sounds: Normal breath sounds.   Musculoskeletal:      Cervical back: Normal range of motion.   Skin:     General: Skin is warm and dry.   Neurological:      Mental Status: She is alert and oriented to person, place, and time.         Assessment/Plan   Problem List Items Addressed This Visit          Genitourinary    Urinary tract infection without hematuria - Primary    Abx- see if any impact to skin lesions as well         Relevant Medications    azithromycin (ZITHROMAX) 250 mg tablet       Musculoskeletal    FSHD (facioscapulohumeral muscular dystrophy) (CMS/HCC)       Immune    CD (Crohn's disease) (CMS/HCC)    Skin lesions on antonio possibly related to this-Reynaldo Nod?          Relevant Medications    celecoxib (celeBREX) 200 mg capsule    predniSONE (DELTASONE) 10 mg tablet       Dermatologic    Skin lesions    Grouped in the mid R shin area  Could be c/w lynda nod  With abx added for uti- will see if any benefit  If not would trial pred to see if it would resolve rash         Relevant Medications    clobetasoL (TEMOVATE) 0.05 % cream     No orders of the defined types were placed in this encounter.

## 2025-06-24 RX ORDER — PREDNISONE 10 MG/1
TABLET ORAL
Qty: 21 TABLET | Refills: 0 | Status: SHIPPED | OUTPATIENT
Start: 2025-06-24

## 2025-06-27 PROBLEM — L98.9 SKIN LESIONS: Status: ACTIVE | Noted: 2025-06-27

## 2025-06-27 PROBLEM — M25.512 CHRONIC PAIN OF BOTH SHOULDERS: Status: ACTIVE | Noted: 2025-06-27

## 2025-06-27 PROBLEM — N39.0 URINARY TRACT INFECTION WITHOUT HEMATURIA: Status: ACTIVE | Noted: 2025-06-27

## 2025-06-27 PROBLEM — G89.29 CHRONIC PAIN OF BOTH SHOULDERS: Status: ACTIVE | Noted: 2025-06-27

## 2025-06-27 PROBLEM — M25.511 CHRONIC PAIN OF BOTH SHOULDERS: Status: ACTIVE | Noted: 2025-06-27

## 2025-06-27 NOTE — ASSESSMENT & PLAN NOTE
Grouped in the mid R shin area  Could be c/w lynda nod  With abx added for uti- will see if any benefit  If not would trial pred to see if it would resolve rash

## 2025-06-30 DIAGNOSIS — F41.9 ANXIETY: ICD-10-CM

## 2025-06-30 RX ORDER — CITALOPRAM 10 MG/1
10 TABLET ORAL DAILY
Qty: 90 TABLET | Refills: 3 | Status: SHIPPED | OUTPATIENT
Start: 2025-06-30

## 2025-07-07 DIAGNOSIS — F41.9 ANXIETY: ICD-10-CM

## 2025-07-07 RX ORDER — LORAZEPAM 0.5 MG/1
TABLET ORAL
Qty: 90 TABLET | OUTPATIENT
Start: 2025-07-07

## 2025-07-07 RX ORDER — LORAZEPAM 0.5 MG/1
0.5 TABLET ORAL DAILY
Qty: 90 TABLET | Refills: 0 | Status: SHIPPED | OUTPATIENT
Start: 2025-07-07

## 2025-07-07 NOTE — TELEPHONE ENCOUNTER
Medication LORazepam (ATIVAN) 0.5 mg tablet   Filled on 6/05/25  # 30  PDMP checked with no red flags    Medicine Refill Request    Last Office Visit: 6/12/2025   Last Consult Visit: Visit date not found  Last Telemedicine Visit: 5/5/2025 Jaycob Peña DO    Next Appointment: Visit date not found      Current Outpatient Medications:     azithromycin (ZITHROMAX) 250 mg tablet, Take 250 mg by mouth daily., Disp: , Rfl:     betamethasone valerate (VALISONE) 0.1 % cream, Apply topically 2 (two) times a day., Disp: 45 g, Rfl: 1    celecoxib (celeBREX) 200 mg capsule, Take 200 mg by mouth., Disp: , Rfl:     cholecalciferol, vitamin D3, 5,000 unit (125 mcg) capsule, Take 5,000 Units by mouth daily., Disp: , Rfl:     citalopram (celeXA) 10 mg tablet, TAKE 1 TABLET DAILY, Disp: 90 tablet, Rfl: 3    clobetasoL (TEMOVATE) 0.05 % cream, Apply topically., Disp: , Rfl:     fluticasone propionate (FLONASE) 50 mcg/actuation nasal spray, Administer 1 spray into each nostril daily., Disp: 16 g, Rfl: 5    hydrocortisone (ANUSOL-HC) 2.5 % rectal cream, Insert into the rectum 2 (two) times a day., Disp: 30 g, Rfl: 1    LORazepam (ATIVAN) 0.5 mg tablet, Take 1 tablet (0.5 mg total) by mouth daily., Disp: 90 tablet, Rfl: 0    multivit with minerals/lutein (MULTIVITAMIN 50 PLUS ORAL), multivitamin, Disp: , Rfl:     naproxen (NAPROSYN) 500 mg tablet, TAKE 1 TABLET(500 MG) BY MOUTH TWICE DAILY WITH MEALS, Disp: 60 tablet, Rfl: 1    nystatin (MYCOSTATIN) 100,000 unit/gram cream, Apply topically as needed for itching or rash., Disp: 30 g, Rfl: 3    oxybutynin (DITROPAN) 5 mg tablet, TAKE 1 TABLET THREE TIMES A DAY (Patient not taking: Reported on 6/12/2025), Disp: 270 tablet, Rfl: 3    predniSONE (DELTASONE) 10 mg tablet, 6 tabs day 1, 5 tabs day 2, 4 tabs day 3, 3 tabs day 4, 2 tabs day 5, 1 tab day 6, Disp: 21 tablet, Rfl: 0    traMADoL (ULTRAM) 50 mg tablet, Take 1 tablet (50 mg total) by mouth every 6 (six) hours as needed for  "moderate pain., Disp: 120 tablet, Rfl: 0    trospium (SANCTURA) 20 mg tablet, Take 20 mg by mouth 2 (two) times a day., Disp: , Rfl:     BP Readings from Last 3 Encounters:   06/12/25 122/88   11/04/24 132/88   04/03/24 120/88       Recent Lab results:  Lab Results   Component Value Date    CHOL 202 (H) 11/05/2024   ,   Lab Results   Component Value Date    HDL 91 11/05/2024   ,   Lab Results   Component Value Date    LDLCALC 96 11/05/2024   ,   Lab Results   Component Value Date    TRIG 66 11/05/2024        Lab Results   Component Value Date    GLUCOSE 83 11/05/2024   , No results found for: \"HGBA1C\"      Lab Results   Component Value Date    CREATININE 0.21 (L) 11/05/2024       Lab Results   Component Value Date    TSH 1.31 11/05/2024         No results found for: \"HGBA1C\"    "

## 2025-07-27 DIAGNOSIS — M18.9 OSTEOARTHRITIS OF CARPOMETACARPAL (CMC) JOINT OF THUMB, UNSPECIFIED LATERALITY, UNSPECIFIED OSTEOARTHRITIS TYPE: ICD-10-CM

## 2025-07-28 RX ORDER — TRAMADOL HYDROCHLORIDE 50 MG/1
50 TABLET, FILM COATED ORAL EVERY 6 HOURS PRN
Qty: 60 TABLET | Refills: 0 | Status: SHIPPED | OUTPATIENT
Start: 2025-07-28

## 2025-07-28 NOTE — TELEPHONE ENCOUNTER
Medication traMADoL (ULTRAM) 50 mg tablet   Filled on 7/07/25  # 30 for 30 days  PDMP checked with no red flags    Medicine Refill Request    Last Office Visit: 6/12/2025   Last Consult Visit: Visit date not found  Last Telemedicine Visit: 5/5/2025 Jaycob Peña,     Next Appointment: Visit date not found      Current Outpatient Medications:     azithromycin (ZITHROMAX) 250 mg tablet, Take 250 mg by mouth daily., Disp: , Rfl:     betamethasone valerate (VALISONE) 0.1 % cream, Apply topically 2 (two) times a day., Disp: 45 g, Rfl: 1    celecoxib (celeBREX) 200 mg capsule, Take 200 mg by mouth., Disp: , Rfl:     cholecalciferol, vitamin D3, 5,000 unit (125 mcg) capsule, Take 5,000 Units by mouth daily., Disp: , Rfl:     citalopram (celeXA) 10 mg tablet, TAKE 1 TABLET DAILY, Disp: 90 tablet, Rfl: 3    clobetasoL (TEMOVATE) 0.05 % cream, Apply topically., Disp: , Rfl:     fluticasone propionate (FLONASE) 50 mcg/actuation nasal spray, Administer 1 spray into each nostril daily., Disp: 16 g, Rfl: 5    hydrocortisone (ANUSOL-HC) 2.5 % rectal cream, Insert into the rectum 2 (two) times a day., Disp: 30 g, Rfl: 1    LORazepam (ATIVAN) 0.5 mg tablet, Take 1 tablet (0.5 mg total) by mouth daily., Disp: 90 tablet, Rfl: 0    multivit with minerals/lutein (MULTIVITAMIN 50 PLUS ORAL), multivitamin, Disp: , Rfl:     naproxen (NAPROSYN) 500 mg tablet, TAKE 1 TABLET(500 MG) BY MOUTH TWICE DAILY WITH MEALS, Disp: 60 tablet, Rfl: 1    nystatin (MYCOSTATIN) 100,000 unit/gram cream, Apply topically as needed for itching or rash., Disp: 30 g, Rfl: 3    oxybutynin (DITROPAN) 5 mg tablet, TAKE 1 TABLET THREE TIMES A DAY (Patient not taking: Reported on 6/12/2025), Disp: 270 tablet, Rfl: 3    predniSONE (DELTASONE) 10 mg tablet, 6 tabs day 1, 5 tabs day 2, 4 tabs day 3, 3 tabs day 4, 2 tabs day 5, 1 tab day 6, Disp: 21 tablet, Rfl: 0    traMADoL (ULTRAM) 50 mg tablet, Take 1 tablet (50 mg total) by mouth every 6 (six) hours as needed  "for moderate pain., Disp: 120 tablet, Rfl: 0    trospium (SANCTURA) 20 mg tablet, Take 20 mg by mouth 2 (two) times a day., Disp: , Rfl:     BP Readings from Last 3 Encounters:   06/12/25 122/88   11/04/24 132/88   04/03/24 120/88       Recent Lab results:  Lab Results   Component Value Date    CHOL 202 (H) 11/05/2024   ,   Lab Results   Component Value Date    HDL 91 11/05/2024   ,   Lab Results   Component Value Date    LDLCALC 96 11/05/2024   ,   Lab Results   Component Value Date    TRIG 66 11/05/2024        Lab Results   Component Value Date    GLUCOSE 83 11/05/2024   , No results found for: \"HGBA1C\"      Lab Results   Component Value Date    CREATININE 0.21 (L) 11/05/2024       Lab Results   Component Value Date    TSH 1.31 11/05/2024         No results found for: \"HGBA1C\"    "

## 2025-07-30 ENCOUNTER — PATIENT MESSAGE (OUTPATIENT)
Dept: PRIMARY CARE | Facility: CLINIC | Age: 50
End: 2025-07-30
Payer: COMMERCIAL

## 2025-07-30 DIAGNOSIS — F41.9 ANXIETY: ICD-10-CM

## 2025-07-30 RX ORDER — LORAZEPAM 0.5 MG/1
0.5 TABLET ORAL DAILY
Qty: 30 TABLET | Refills: 0 | Status: SHIPPED | OUTPATIENT
Start: 2025-07-30 | End: 2025-08-29

## 2025-08-01 DIAGNOSIS — F41.9 ANXIETY: ICD-10-CM

## 2025-08-04 RX ORDER — LORAZEPAM 0.5 MG/1
0.5 TABLET ORAL DAILY
Qty: 30 TABLET | Refills: 0 | OUTPATIENT
Start: 2025-08-04 | End: 2025-09-03

## 2025-08-12 ENCOUNTER — HOSPITAL ENCOUNTER (OUTPATIENT)
Dept: RADIOLOGY | Facility: HOSPITAL | Age: 50
Discharge: HOME | End: 2025-08-12
Attending: INTERNAL MEDICINE
Payer: COMMERCIAL

## 2025-08-12 DIAGNOSIS — K50.80 REGIONAL ENTERITIS OF SMALL INTESTINE WITH LARGE INTESTINE (CMS/HCC): ICD-10-CM

## 2025-08-12 PROCEDURE — 63600105 HC IODINE BASED CONTRAST: Mod: JZ | Performed by: INTERNAL MEDICINE

## 2025-08-12 PROCEDURE — 74177 CT ABD & PELVIS W/CONTRAST: CPT

## 2025-08-12 RX ORDER — IOPAMIDOL 755 MG/ML
100 INJECTION, SOLUTION INTRAVASCULAR
Status: DISCONTINUED | OUTPATIENT
Start: 2025-08-12 | End: 2025-08-13 | Stop reason: HOSPADM

## 2025-08-12 RX ORDER — IOPAMIDOL 755 MG/ML
100 INJECTION, SOLUTION INTRAVASCULAR
Status: COMPLETED | OUTPATIENT
Start: 2025-08-12 | End: 2025-08-12

## 2025-08-12 RX ADMIN — IOPAMIDOL 100 ML: 755 INJECTION, SOLUTION INTRAVENOUS at 12:33

## 2025-08-20 ENCOUNTER — TELEMEDICINE (OUTPATIENT)
Dept: PRIMARY CARE | Facility: CLINIC | Age: 50
End: 2025-08-20
Payer: COMMERCIAL

## 2025-08-20 DIAGNOSIS — L52 ERYTHEMA NODOSUM: ICD-10-CM

## 2025-08-20 DIAGNOSIS — N21.0 BLADDER STONES: ICD-10-CM

## 2025-08-20 DIAGNOSIS — K50.80 CROHN'S DISEASE OF BOTH SMALL AND LARGE INTESTINE WITHOUT COMPLICATIONS (CMS/HCC): Primary | ICD-10-CM

## 2025-08-20 DIAGNOSIS — G71.02 FSHD (FACIOSCAPULOHUMERAL MUSCULAR DYSTROPHY) (CMS/HCC): ICD-10-CM

## 2025-08-20 PROBLEM — G82.52 QUADRIPLEGIA, C1-C4 INCOMPLETE (CMS/HCC): Status: RESOLVED | Noted: 2020-11-17 | Resolved: 2025-08-20

## 2025-08-20 PROBLEM — M25.512 PAIN IN JOINT OF LEFT SHOULDER: Status: ACTIVE | Noted: 2024-12-10

## 2025-08-20 PROBLEM — R26.2 AMBULATORY DYSFUNCTION: Status: ACTIVE | Noted: 2025-08-20

## 2025-08-20 PROBLEM — T14.90XS: Status: ACTIVE | Noted: 2017-12-08

## 2025-08-20 PROCEDURE — 99214 OFFICE O/P EST MOD 30 MIN: CPT | Mod: 95 | Performed by: INTERNAL MEDICINE

## 2025-08-20 RX ORDER — PREDNISONE 10 MG/1
TABLET ORAL
Qty: 60 TABLET | Refills: 0 | Status: SHIPPED | OUTPATIENT
Start: 2025-08-20

## 2025-08-25 PROBLEM — N21.0 BLADDER STONES: Status: ACTIVE | Noted: 2025-08-25

## 2025-08-25 ASSESSMENT — ENCOUNTER SYMPTOMS
EYE PAIN: 0
FATIGUE: 0
APPETITE CHANGE: 0
ARTHRALGIAS: 1
CHEST TIGHTNESS: 0
SHORTNESS OF BREATH: 0
HEADACHES: 0
FEVER: 0
ENDOCRINE NEGATIVE: 1
MYALGIAS: 1
DIZZINESS: 0
COUGH: 0
PALPITATIONS: 0
ACTIVITY CHANGE: 0
WEAKNESS: 1